# Patient Record
Sex: FEMALE | Race: WHITE | NOT HISPANIC OR LATINO | Employment: OTHER | URBAN - METROPOLITAN AREA
[De-identification: names, ages, dates, MRNs, and addresses within clinical notes are randomized per-mention and may not be internally consistent; named-entity substitution may affect disease eponyms.]

---

## 2018-07-20 ENCOUNTER — IN-CLINIC DEVICE VISIT (OUTPATIENT)
Dept: CARDIOLOGY CLINIC | Facility: CLINIC | Age: 83
End: 2018-07-20
Payer: MEDICARE

## 2018-07-20 DIAGNOSIS — I49.5 SICK SINUS SYNDROME (HCC): Primary | ICD-10-CM

## 2018-07-20 DIAGNOSIS — Z95.0 PRESENCE OF PERMANENT CARDIAC PACEMAKER: ICD-10-CM

## 2018-07-20 PROCEDURE — 93280 PM DEVICE PROGR EVAL DUAL: CPT | Performed by: INTERNAL MEDICINE

## 2018-08-15 PROBLEM — I63.9 CVA (CEREBRAL VASCULAR ACCIDENT) (HCC): Status: ACTIVE | Noted: 2018-08-15

## 2018-08-15 PROBLEM — E03.9 HYPOTHYROIDISM: Status: ACTIVE | Noted: 2018-08-15

## 2018-08-15 PROBLEM — R01.1 CARDIAC MURMUR: Status: ACTIVE | Noted: 2018-08-15

## 2018-08-15 PROBLEM — I44.2 COMPLETE ATRIOVENTRICULAR BLOCK (HCC): Status: ACTIVE | Noted: 2018-08-15

## 2018-08-15 PROBLEM — E78.2 MIXED HYPERLIPIDEMIA: Status: ACTIVE | Noted: 2018-08-15

## 2018-08-15 PROBLEM — E78.5 DYSLIPIDEMIA: Status: ACTIVE | Noted: 2018-08-15

## 2018-08-15 PROBLEM — I10 HYPERTENSION: Status: ACTIVE | Noted: 2018-08-15

## 2018-08-15 PROBLEM — Z95.0 CARDIAC PACEMAKER IN SITU: Status: ACTIVE | Noted: 2018-08-15

## 2018-08-15 RX ORDER — ASPIRIN 325 MG
325 TABLET ORAL DAILY
COMMUNITY

## 2018-08-15 RX ORDER — LEVOTHYROXINE SODIUM 0.03 MG/1
25 TABLET ORAL DAILY
COMMUNITY

## 2018-08-15 RX ORDER — PANTOPRAZOLE SODIUM 40 MG/1
40 TABLET, DELAYED RELEASE ORAL DAILY
COMMUNITY

## 2018-08-22 ENCOUNTER — APPOINTMENT (EMERGENCY)
Dept: CT IMAGING | Facility: HOSPITAL | Age: 83
DRG: 301 | End: 2018-08-22
Payer: MEDICARE

## 2018-08-22 ENCOUNTER — HOSPITAL ENCOUNTER (INPATIENT)
Facility: HOSPITAL | Age: 83
LOS: 2 days | Discharge: HOME | DRG: 301 | End: 2018-08-25
Attending: EMERGENCY MEDICINE | Admitting: GENERAL PRACTICE
Payer: MEDICARE

## 2018-08-22 DIAGNOSIS — R94.31 ABNORMAL EKG: ICD-10-CM

## 2018-08-22 DIAGNOSIS — I71.2 ASCENDING AORTIC ANEURYSM (HCC): Primary | ICD-10-CM

## 2018-08-22 DIAGNOSIS — R55 NEAR SYNCOPE: ICD-10-CM

## 2018-08-22 PROBLEM — F03.90 DEMENTIA (HCC): Status: ACTIVE | Noted: 2018-08-22

## 2018-08-22 LAB
ALBUMIN SERPL BCP-MCNC: 3.6 G/DL (ref 3.5–5)
ALP SERPL-CCNC: 103 U/L (ref 46–116)
ALT SERPL W P-5'-P-CCNC: 14 U/L (ref 12–78)
ANION GAP SERPL CALCULATED.3IONS-SCNC: 5 MMOL/L (ref 4–13)
APTT PPP: 27 SECONDS (ref 24–36)
AST SERPL W P-5'-P-CCNC: 14 U/L (ref 5–45)
ATRIAL RATE: 83 BPM
BACTERIA UR QL AUTO: ABNORMAL /HPF
BASOPHILS # BLD AUTO: 0.03 THOUSANDS/ΜL (ref 0–0.1)
BASOPHILS NFR BLD AUTO: 0 % (ref 0–1)
BILIRUB SERPL-MCNC: 0.7 MG/DL (ref 0.2–1)
BILIRUB UR QL STRIP: NEGATIVE
BUN SERPL-MCNC: 21 MG/DL (ref 5–25)
CALCIUM SERPL-MCNC: 9.7 MG/DL (ref 8.3–10.1)
CHLORIDE SERPL-SCNC: 99 MMOL/L (ref 100–108)
CLARITY UR: CLEAR
CO2 SERPL-SCNC: 33 MMOL/L (ref 21–32)
COLOR UR: YELLOW
CREAT SERPL-MCNC: 1.15 MG/DL (ref 0.6–1.3)
EOSINOPHIL # BLD AUTO: 0.03 THOUSAND/ΜL (ref 0–0.61)
EOSINOPHIL NFR BLD AUTO: 0 % (ref 0–6)
ERYTHROCYTE [DISTWIDTH] IN BLOOD BY AUTOMATED COUNT: 15.6 % (ref 11.6–15.1)
GFR SERPL CREATININE-BSD FRML MDRD: 40 ML/MIN/1.73SQ M
GLUCOSE SERPL-MCNC: 106 MG/DL (ref 65–140)
GLUCOSE UR STRIP-MCNC: NEGATIVE MG/DL
HCT VFR BLD AUTO: 45.3 % (ref 34.8–46.1)
HGB BLD-MCNC: 14.5 G/DL (ref 11.5–15.4)
HGB UR QL STRIP.AUTO: NEGATIVE
IMM GRANULOCYTES # BLD AUTO: 0.05 THOUSAND/UL (ref 0–0.2)
IMM GRANULOCYTES NFR BLD AUTO: 1 % (ref 0–2)
INR PPP: 0.95 (ref 0.86–1.17)
KETONES UR STRIP-MCNC: NEGATIVE MG/DL
LACTATE SERPL-SCNC: 0.9 MMOL/L (ref 0.5–2)
LEUKOCYTE ESTERASE UR QL STRIP: ABNORMAL
LIPASE SERPL-CCNC: 465 U/L (ref 73–393)
LYMPHOCYTES # BLD AUTO: 0.77 THOUSANDS/ΜL (ref 0.6–4.47)
LYMPHOCYTES NFR BLD AUTO: 9 % (ref 14–44)
MAGNESIUM SERPL-MCNC: 1.8 MG/DL (ref 1.6–2.6)
MCH RBC QN AUTO: 27.2 PG (ref 26.8–34.3)
MCHC RBC AUTO-ENTMCNC: 32 G/DL (ref 31.4–37.4)
MCV RBC AUTO: 85 FL (ref 82–98)
MONOCYTES # BLD AUTO: 0.85 THOUSAND/ΜL (ref 0.17–1.22)
MONOCYTES NFR BLD AUTO: 10 % (ref 4–12)
NEUTROPHILS # BLD AUTO: 6.51 THOUSANDS/ΜL (ref 1.85–7.62)
NEUTS SEG NFR BLD AUTO: 80 % (ref 43–75)
NITRITE UR QL STRIP: NEGATIVE
NON-SQ EPI CELLS URNS QL MICRO: ABNORMAL /HPF
NRBC BLD AUTO-RTO: 0 /100 WBCS
PH UR STRIP.AUTO: 7 [PH] (ref 4.5–8)
PLATELET # BLD AUTO: 174 THOUSANDS/UL (ref 149–390)
PMV BLD AUTO: 10.2 FL (ref 8.9–12.7)
POTASSIUM SERPL-SCNC: 4.3 MMOL/L (ref 3.5–5.3)
PROT SERPL-MCNC: 7.7 G/DL (ref 6.4–8.2)
PROT UR STRIP-MCNC: NEGATIVE MG/DL
PROTHROMBIN TIME: 12.6 SECONDS (ref 11.8–14.2)
QRS AXIS: -30 DEGREES
QRSD INTERVAL: 118 MS
QT INTERVAL: 412 MS
QTC INTERVAL: 484 MS
RBC # BLD AUTO: 5.33 MILLION/UL (ref 3.81–5.12)
RBC #/AREA URNS AUTO: ABNORMAL /HPF
SODIUM SERPL-SCNC: 137 MMOL/L (ref 136–145)
SP GR UR STRIP.AUTO: <=1.005 (ref 1–1.03)
T WAVE AXIS: -56 DEGREES
TROPONIN I SERPL-MCNC: 0.04 NG/ML
UROBILINOGEN UR QL STRIP.AUTO: 1 E.U./DL
VENTRICULAR RATE: 83 BPM
WBC # BLD AUTO: 8.24 THOUSAND/UL (ref 4.31–10.16)
WBC #/AREA URNS AUTO: ABNORMAL /HPF

## 2018-08-22 PROCEDURE — 83735 ASSAY OF MAGNESIUM: CPT | Performed by: EMERGENCY MEDICINE

## 2018-08-22 PROCEDURE — 83690 ASSAY OF LIPASE: CPT | Performed by: EMERGENCY MEDICINE

## 2018-08-22 PROCEDURE — 81001 URINALYSIS AUTO W/SCOPE: CPT | Performed by: EMERGENCY MEDICINE

## 2018-08-22 PROCEDURE — 96374 THER/PROPH/DIAG INJ IV PUSH: CPT

## 2018-08-22 PROCEDURE — 36415 COLL VENOUS BLD VENIPUNCTURE: CPT | Performed by: EMERGENCY MEDICINE

## 2018-08-22 PROCEDURE — 96361 HYDRATE IV INFUSION ADD-ON: CPT

## 2018-08-22 PROCEDURE — 93010 ELECTROCARDIOGRAM REPORT: CPT | Performed by: INTERNAL MEDICINE

## 2018-08-22 PROCEDURE — 84484 ASSAY OF TROPONIN QUANT: CPT | Performed by: EMERGENCY MEDICINE

## 2018-08-22 PROCEDURE — 83605 ASSAY OF LACTIC ACID: CPT | Performed by: EMERGENCY MEDICINE

## 2018-08-22 PROCEDURE — 85610 PROTHROMBIN TIME: CPT | Performed by: EMERGENCY MEDICINE

## 2018-08-22 PROCEDURE — 99285 EMERGENCY DEPT VISIT HI MDM: CPT

## 2018-08-22 PROCEDURE — 99220 PR INITIAL OBSERVATION CARE/DAY 70 MINUTES: CPT | Performed by: FAMILY MEDICINE

## 2018-08-22 PROCEDURE — 80053 COMPREHEN METABOLIC PANEL: CPT | Performed by: EMERGENCY MEDICINE

## 2018-08-22 PROCEDURE — 71275 CT ANGIOGRAPHY CHEST: CPT

## 2018-08-22 PROCEDURE — 85730 THROMBOPLASTIN TIME PARTIAL: CPT | Performed by: EMERGENCY MEDICINE

## 2018-08-22 PROCEDURE — 85025 COMPLETE CBC W/AUTO DIFF WBC: CPT | Performed by: EMERGENCY MEDICINE

## 2018-08-22 PROCEDURE — 70450 CT HEAD/BRAIN W/O DYE: CPT

## 2018-08-22 PROCEDURE — 74175 CTA ABDOMEN W/CONTRAST: CPT

## 2018-08-22 PROCEDURE — 93005 ELECTROCARDIOGRAM TRACING: CPT

## 2018-08-22 RX ORDER — POLYVINYL ALCOHOL 14 MG/ML
2 SOLUTION/ DROPS OPHTHALMIC DAILY
COMMUNITY

## 2018-08-22 RX ORDER — LABETALOL HYDROCHLORIDE 5 MG/ML
10 INJECTION, SOLUTION INTRAVENOUS ONCE
Status: COMPLETED | OUTPATIENT
Start: 2018-08-22 | End: 2018-08-22

## 2018-08-22 RX ORDER — PANTOPRAZOLE SODIUM 40 MG/1
40 TABLET, DELAYED RELEASE ORAL DAILY
Status: DISCONTINUED | OUTPATIENT
Start: 2018-08-22 | End: 2018-08-25 | Stop reason: HOSPADM

## 2018-08-22 RX ORDER — ONDANSETRON 2 MG/ML
4 INJECTION INTRAMUSCULAR; INTRAVENOUS EVERY 6 HOURS PRN
Status: DISCONTINUED | OUTPATIENT
Start: 2018-08-22 | End: 2018-08-25 | Stop reason: HOSPADM

## 2018-08-22 RX ORDER — ACETAMINOPHEN 325 MG/1
650 TABLET ORAL ONCE
Status: COMPLETED | OUTPATIENT
Start: 2018-08-22 | End: 2018-08-22

## 2018-08-22 RX ORDER — LIDOCAINE 50 MG/G
1 PATCH TOPICAL ONCE
Status: COMPLETED | OUTPATIENT
Start: 2018-08-22 | End: 2018-08-22

## 2018-08-22 RX ORDER — CYCLOSPORINE 0.5 MG/ML
1 EMULSION OPHTHALMIC 2 TIMES DAILY
COMMUNITY

## 2018-08-22 RX ORDER — IPRATROPIUM BROMIDE AND ALBUTEROL SULFATE 2.5; .5 MG/3ML; MG/3ML
3 SOLUTION RESPIRATORY (INHALATION) ONCE
Status: COMPLETED | OUTPATIENT
Start: 2018-08-22 | End: 2018-08-22

## 2018-08-22 RX ORDER — LEVOTHYROXINE SODIUM 0.03 MG/1
25 TABLET ORAL DAILY
Status: DISCONTINUED | OUTPATIENT
Start: 2018-08-22 | End: 2018-08-25 | Stop reason: HOSPADM

## 2018-08-22 RX ORDER — ASPIRIN 325 MG
325 TABLET ORAL DAILY
Status: DISCONTINUED | OUTPATIENT
Start: 2018-08-22 | End: 2018-08-25 | Stop reason: HOSPADM

## 2018-08-22 RX ORDER — POLYVINYL ALCOHOL 14 MG/ML
2 SOLUTION/ DROPS OPHTHALMIC DAILY
Status: DISCONTINUED | OUTPATIENT
Start: 2018-08-22 | End: 2018-08-22

## 2018-08-22 RX ADMIN — ACETAMINOPHEN 650 MG: 325 TABLET, FILM COATED ORAL at 10:38

## 2018-08-22 RX ADMIN — LEVOTHYROXINE SODIUM 25 MCG: 25 TABLET ORAL at 15:52

## 2018-08-22 RX ADMIN — PANTOPRAZOLE SODIUM 40 MG: 40 TABLET, DELAYED RELEASE ORAL at 15:52

## 2018-08-22 RX ADMIN — IPRATROPIUM BROMIDE AND ALBUTEROL SULFATE 3 ML: .5; 3 SOLUTION RESPIRATORY (INHALATION) at 10:38

## 2018-08-22 RX ADMIN — IODIXANOL 85 ML: 320 INJECTION, SOLUTION INTRAVASCULAR at 11:29

## 2018-08-22 RX ADMIN — DEXTRAN 70 AND HYPROMELLOSE 2910 2 DROP: 1; 3 SOLUTION/ DROPS OPHTHALMIC at 16:03

## 2018-08-22 RX ADMIN — SODIUM CHLORIDE 1000 ML: 0.9 INJECTION, SOLUTION INTRAVENOUS at 10:31

## 2018-08-22 RX ADMIN — LIDOCAINE 1 PATCH: 50 PATCH TOPICAL at 10:38

## 2018-08-22 RX ADMIN — ASPIRIN 325 MG: 325 TABLET ORAL at 15:52

## 2018-08-22 RX ADMIN — LABETALOL 20 MG/4 ML (5 MG/ML) INTRAVENOUS SYRINGE 10 MG: at 12:12

## 2018-08-22 NOTE — ASSESSMENT & PLAN NOTE
Near-syncope  Aspirin information gathered was most likely related to vasovagal syncope  CTA chest Ascending thoracic aortic aneurysm measuring up to 50 mm  Questionable haziness at the posterior proximal margin of the aneurysm within the mediastinum on this examination which is unfortunately significantly limited as a result of streak artifact  related to pacemaker generator and because the patient could not bring arms above head or tolerate breath-hold examination    The possibility of leaking of ascending thoracic aortic aneurysm cannot therefore unfortunately not be confidently excluded   -follow-up precaution  -continue telemetry  -Orthostatic blood pressure  -Blood pressure control  Discharge planning

## 2018-08-22 NOTE — PLAN OF CARE
Problem: PAIN - ADULT  Goal: Verbalizes/displays adequate comfort level or baseline comfort level  Interventions:  - Encourage patient to monitor pain and request assistance  - Assess pain using appropriate pain scale  - Administer analgesics based on type and severity of pain and evaluate response  - Implement non-pharmacological measures as appropriate and evaluate response  - Consider cultural and social influences on pain and pain management  - Notify physician/advanced practitioner if interventions unsuccessful or patient reports new pain   Outcome: Progressing      Problem: INFECTION - ADULT  Goal: Absence or prevention of progression during hospitalization  INTERVENTIONS:  - Assess and monitor for signs and symptoms of infection  - Monitor lab/diagnostic results  - Monitor all insertion sites, i e  indwelling lines, tubes, and drains  - Monitor endotracheal (as able) and nasal secretions for changes in amount and color  - [place] appropriate cooling/warming therapies per order  - Administer medications as ordered  - Instruct and encourage patient and family to use good hand hygiene technique  - Identify and instruct in appropriate isolation precautions for identified infection/condition   Outcome: Progressing    Goal: Absence of fever/infection during neutropenic period  INTERVENTIONS:  - Monitor WBC  - Implement neutropenic guidelines   Outcome: Progressing      Problem: SAFETY ADULT  Goal: Maintain or return to baseline ADL function  INTERVENTIONS:  -  Assess patient's ability to carry out ADLs; assess patient's baseline for ADL function and identify physical deficits which impact ability to perform ADLs (bathing, care of mouth/teeth, toileting, grooming, dressing, etc )  - Assess/evaluate cause of self-care deficits   - Assess range of motion  - Assess patient's mobility; develop plan if impaired  - Assess patient's need for assistive devices and provide as appropriate  - Encourage maximum independence but intervene and supervise when necessary  ¯ Involve family in performance of ADLs  ¯ Assess for home care needs following discharge   ¯ Request OT consult to assist with ADL evaluation and planning for discharge  ¯ Provide patient education as appropriate   Outcome: Progressing    Goal: Maintain or return mobility status to optimal level  INTERVENTIONS:  - Assess patient's baseline mobility status (ambulation, transfers, stairs, etc )    - Identify cognitive and physical deficits and behaviors that affect mobility  - Identify mobility aids required to assist with transfers and/or ambulation (gait belt, sit-to-stand, lift, walker, cane, etc )  - [place] fall precautions as indicated by assessment  - Record patient progress and toleration of activity level on Mobility SBAR; progress patient to next Phase/Stage  - Instruct patient to call for assistance with activity based on assessment  - Request Rehabilitation consult to assist with strengthening/weightbearing, etc    Outcome: Progressing      Problem: DISCHARGE PLANNING  Goal: Discharge to home or other facility with appropriate resources  INTERVENTIONS:  - Identify barriers to discharge w/patient and caregiver  - Arrange for needed discharge resources and transportation as appropriate  - Identify discharge learning needs (meds, wound care, etc )  - Arrange for interpretive services to assist at discharge as needed  - Refer to Case Management Department for coordinating discharge planning if the patient needs post-hospital services based on physician/advanced practitioner order or complex needs related to functional status, cognitive ability, or social support system   Outcome: Progressing      Problem: Knowledge Deficit  Goal: Patient/family/caregiver demonstrates understanding of disease process, treatment plan, medications, and discharge instructions  Complete learning assessment and assess knowledge base    Interventions:  - Provide teaching at level of understanding  - Provide teaching via preferred learning methods   Outcome: Progressing

## 2018-08-22 NOTE — ED NOTES
Thea from [address] [place] [address] updated of patient's admission status     Kevon Kendall RN  08/22/18 0921

## 2018-08-22 NOTE — H&P
H&P- Mecca Redmond 5/2/1921, 80 y o  female MRN: [id number]    Unit/Bed#: ED 16 Encounter: [id number]    Primary Care Provider: Sandip Lord MD   Date and time admitted to hospital: 8/22/2018  9:52 AM        * Syncope   Assessment & Plan    Near-syncope  Aspirin information gathered was most likely related to vasovagal syncope  CTA chest Ascending thoracic aortic aneurysm measuring up to 50 mm  Questionable haziness at the posterior proximal margin of the aneurysm within the mediastinum on this examination which is unfortunately significantly limited as a result of streak artifact  related to pacemaker generator and because the patient could not bring arms above head or tolerate breath-hold examination  The possibility of leaking of ascending thoracic aortic aneurysm cannot therefore unfortunately not be confidently excluded   -follow-up precaution  -continue telemetry  -Orthostatic blood pressure  -Blood pressure control  Discharge planning        Hypertension   Assessment & Plan    Blood pressure is stable for age  Continue home medication        CVA (cerebral vascular accident) [facility])   Assessment & Plan    Previously  Continue  secondary prevention        Cardiac pacemaker in situ   Assessment & Plan    No acute events        Hypothyroidism   Assessment & Plan    Continue levothyroxine              Anticipated Length of Stay:  Patient will be admitted on an Observation basis with an anticipated length of stay of  < 2 midnights  Justification for Hospital Stay: near syncope    Total Time for Visit, including Counseling / Coordination of Care: 1 hour  Greater than 50% of this total time spent on direct patient counseling and coordination of care  Chief Complaint:       History of Present Illness:    Mecca Redmond is a 80 y o  female significant medical hx pf CAD, CVA, Hypothyroidism, Hyperlipidemia, HTN who was brought to ED after patient has an episode of near syncope at [facility]  Information gather from EMR  Patient doesn't know why she is here  She states maybe she was having some breathing problem  Review of Systems:    Review of Systems   Constitutional: Negative for activity change and appetite change  Eyes: Negative for pain, discharge, redness and itching  Respiratory: Negative for apnea, cough, choking and chest tightness  Cardiovascular: Positive for chest pain  Musculoskeletal: Positive for back pain  Neurological: Positive for syncope  Negative for dizziness and facial asymmetry  Past Medical and Surgical History:     Past Medical History:   Diagnosis Date    Atrioventricular block, complete ([address] )     CAD (coronary artery disease)     Cardiac pacemaker in situ     Cerebral infarct (HCC)     due to unspecified occlusion and stenosis of unspecified cerebral artery    Essential (primary) hypertension     Mixed hyperlipidemia     Subarachnoid hemorrhage (HCC)        Past Surgical History:   Procedure Laterality Date    CARDIAC PACEMAKER PLACEMENT         Meds/Allergies:    Prior to Admission medications    Medication Sig Start Date End Date Taking? Authorizing Provider   aspirin 325 mg tablet Take 325 mg by mouth daily    Historical Provider, MD   Cholecalciferol 2000 units TABS Take 1 tablet by mouth daily    Historical Provider, MD   levothyroxine 25 mcg tablet Take 25 mcg by mouth daily    Historical Provider, MD   pantoprazole (PROTONIX) 40 mg tablet Take 40 mg by mouth daily    Historical Provider, MD     I have reviewed home medications with patient personally      Allergies: No Known Allergies    Social History:     Marital Status: /Civil Union   Occupation:  Patient Pre-hospital Living Situation:   Patient Pre-hospital Level of Mobility:   Patient Pre-hospital Diet Restrictions:   Substance Use History:   History   Alcohol use Not on file     History   Smoking Status    Former Smoker   Smokeless Tobacco    Not on file     History   Drug use: Unknown       Family History:    non-contributory    Physical Exam:     Vitals:   Blood Pressure: 128/58 (08/22/18 1300)  Pulse: 60 (08/22/18 1300)  Temperature: (!) 97 4 °F (36 3 °C) (08/22/18 0955)  Temp Source: Oral (08/22/18 0955)  Respirations: 18 (08/22/18 1300)  Weight - Scale: 46 1 kg (101 lb 10 1 oz) (08/22/18 0955)  SpO2: 99 % (08/22/18 1300)    Physical Exam   Constitutional: She is oriented to person, place, and time  Neck: No JVD present  No tracheal deviation present  No thyromegaly present  Cardiovascular: Normal rate, regular rhythm and normal heart sounds  Exam reveals no gallop and no friction rub  No murmur heard  Pulmonary/Chest: Effort normal and breath sounds normal  No respiratory distress  She has no wheezes  She has no rales  She exhibits tenderness  Abdominal: She exhibits no distension and no mass  There is no tenderness  There is no rebound and no guarding  Musculoskeletal: She exhibits no edema, tenderness or deformity  Lymphadenopathy:     She has no cervical adenopathy  Neurological: She is alert and oriented to person, place, and time  She has normal reflexes  No cranial nerve deficit  Coordination normal    Skin: Skin is warm  She is not diaphoretic  Psychiatric: She has a normal mood and affect  Additional Data:     Lab Results: I have personally reviewed pertinent reports          Results from last 7 days  Lab Units 08/22/18  1028   WBC Thousand/uL 8 24   HEMOGLOBIN g/dL 14 5   HEMATOCRIT % 45 3   PLATELETS Thousands/uL 174   NEUTROS PCT % 80*   LYMPHS PCT % 9*   MONOS PCT % 10   EOS PCT % 0       Results from last 7 days  Lab Units 08/22/18  1028   SODIUM mmol/L 137   POTASSIUM mmol/L 4 3   CHLORIDE mmol/L 99*   CO2 mmol/L 33*   BUN mg/dL 21   CREATININE mg/dL 1 15   CALCIUM mg/dL 9 7   TOTAL PROTEIN g/dL 7 7   BILIRUBIN TOTAL mg/dL 0 70   ALK PHOS U/L 103   ALT U/L 14   AST U/L 14   GLUCOSE RANDOM mg/dL 106       Results from last 7 days  Lab Units 08/22/18  1028   INR  0 95       Imaging: I have personally reviewed pertinent reports  Ct Head Without Contrast    Result Date: 8/22/2018  Narrative: CT BRAIN - WITHOUT CONTRAST INDICATION:   Near syncope  History of prior stroke  COMPARISON:  None  TECHNIQUE:  CT examination of the brain was performed  In addition to axial images, coronal 2D reformatted images were created and submitted for interpretation  Radiation dose length product (DLP) for this visit:  949 mGy-cm   This examination, like all CT scans performed in the [facility], was performed utilizing techniques to minimize radiation dose exposure, including the use of iterative reconstruction and automated exposure control  IMAGE QUALITY:  Diagnostic  FINDINGS: PARENCHYMA:  There is encephalomalacia in the left frontal lobe and anterior insula related to prior left anterior MCA distribution infarction  There is a small chronic cortical infarction in high anterior right frontal lobe  Chronic infarctions are noted bilateral posterior inferior cerebellar artery distributions, larger on the right than on the left  No CT evidence of acute intracranial ischemia  No intracranial mass, mass effect or midline shift  No acute intracranial hemorrhage  Atherosclerotic vessel calcifications are noted  VENTRICLES AND EXTRA-AXIAL SPACES:  Ex vacuo enlargement of left lateral ventricle  Age-related volume loss  No hydrocephalus  VISUALIZED ORBITS AND PARANASAL SINUSES:  Unremarkable  CALVARIUM AND EXTRACRANIAL SOFT TISSUES:  Normal      Impression: No acute intracranial abnormality  Multiple chronic infarctions as described  Workstation performed: [id number]     Cta Dissection Protocol Chest And Abdomen    Result Date: 8/22/2018  Narrative: CTA - CHEST AND ABDOMEN  - WITHOUT AND WITH IV CONTRAST INDICATION:   Near syncope  Left-sided chest pain  Left flank pain    COMPARISON:  None   TECHNIQUE: CT examination of the chest and abdomen was performed both prior to and after the administration of intravenous contrast   Thin section angiographic arterial phase post contrast technique was used in order to evaluate for aortic dissection  3D reformatted images and volume rendering were performed on an independent workstation  Additionally, axial, sagittal, and coronal 2D reformatted images were created from the source data and submitted for interpretation  Radiation dose length product (DLP) for this visit:  487 mGy-cm   This examination, like all CT scans performed in the [facility], was performed utilizing techniques to minimize radiation dose exposure, including the use of iterative reconstruction and automated exposure control  IV Contrast:  85 mL of iodixanol (VISIPAQUE) Enteric Contrast: Enteric contrast was not administered  FINDINGS: AORTA: There is fusiform prominence of the supravalvular ascending thoracic aorta measuring up to 35 mm  Also noted is a more focal ascending thoracic aortic aneurysm measuring up to 50 mm (series 604 image 81)  There is mural atheroma within the walls  of this more focal and larger aneurysm which is at the level of the left common carotid artery origin  The aorta tapers at the level of the proximal aortic arch to 31 mm and remains at approximately 27 mm to the level of the aortic hiatus where the aorta again becomes enlarged measuring up to 32 mm in greatest caliber  Also noted is an infrarenal abdominal aortic aneurysm measuring up to 41 mm in caliber with extensive mural atheroma  Common iliac arteries are patent  At the proximal margin of the ascending thoracic aortic aneurysm there is questionable haziness of the mediastinal fat along the posterior wall of the ascending thoracic aorta which is unfortunately very poorly evaluated in this patient who could not lift her arms above her head, has streak artifact from a left chest pacemaker generator, and demonstrates respiratory motion artifact  The possibility of leaking of ascending thoracic aortic aneurysm cannot therefore unfortunately not be confidently excluded on the basis of the current examination  There is no aortic dissection  Extensive atherosclerotic calcification is noted without evidence of flow-limiting vascular stenosis of the aorta or great vessels in the chest   There is mild less than 50% atherosclerotic stenosis at origin of renal arteries bilaterally  Otherwise, no flow-limiting atherosclerotic vascular stenosis in the visualized abdomen  No pulmonary embolism  CHEST LUNGS:  Bibasilar atelectasis is noted  No dominant pulmonary parenchymal mass  No airspace opacity to suggest pneumonia  PLEURA:  Unremarkable  HEART/GREAT VESSELS:  Heart is enlarged  No pericardial effusion  Pacemaker leads noted in the right heart  MEDIASTINUM AND WILLIAM:  Diffuse air and fluid-filled prominence of the esophagus noted  CHEST WALL AND LOWER NECK:   Patient is somewhat cachectic  Left chest pacemaker is noted  No axillary lymphadenopathy  ABDOMEN LIVER/BILIARY TREE:  There is prominence of common bile duct and central intrahepatic biliary tree most consistent with the sequela of prior cholecystectomy  Liver is otherwise unremarkable  GALLBLADDER:  Gallbladder is surgically absent  SPLEEN:  Unremarkable  PANCREAS:  Unremarkable  ADRENAL GLANDS:  Incompletely evaluated 9 mm left adrenal nodule, statistically most likely to represent adenoma  KIDNEYS/URETERS:  Bilateral renal cortical thinning is noted  No solid renal mass  No hydronephrosis  VISUALIZED STOMACH, BOWEL AND APPENDIX:  No bowel obstruction  Colonic diverticulosis without convincing evidence of acute diverticulitis  VISUALIZED ABDOMINOPELVIC CAVITY:  No ascites or free intraperitoneal air  No lymphadenopathy  ABDOMINAL WALL:  Unremarkable  OSSEOUS STRUCTURES:  Osseous structures are osteopenic    There is anterior wedge compression deformity of the L1 and L3 vertebral bodies, of indeterminate age     Impression: Ascending thoracic aortic aneurysm measuring up to 50 mm  Questionable haziness at the posterior proximal margin of the aneurysm within the mediastinum on this examination which is unfortunately significantly limited as a result of streak artifact related to pacemaker generator and because the patient could not bring arms above head or tolerate breath-hold examination  The possibility of leaking of ascending thoracic aortic aneurysm cannot therefore unfortunately not be confidently excluded on the basis of the current examination  There is no significant sized mediastinal hematoma and no evidence of active extravasation of injected intravenous contrast  No aortic dissection  Extensive atherosclerotic change  Infrarenal abdominal aortic aneurysm measuring up to 41 mm  Age indeterminate anterior wedge compression deformities of the L1 and L3 vertebral bodies  I personally discussed this study with Juju Payton on 8/22/2018 at 12:02 PM  Workstation performed: [id number]       EKG, Pathology, and Other Studies Reviewed on Admission:   · EKG:     Allscripts / Epic Records Reviewed: Yes     ** Please Note: This note has been constructed using a voice recognition system   **

## 2018-08-22 NOTE — ED PROVIDER NOTES
History  Chief Complaint   Patient presents with    Syncope     Pt was brought in by EMS from [address] [place] [address] due to a near syncopal event this morning at breakfast  Pt currently complains of L flank pain  Patient is a 79-year-old female with past medical history of prior cerebral infarct with baseline aphasia, coronary artery disease, complete AV block status post pacemaker, hypertension, hyperlipidemia, hypothyroidism, presents to the emergency department by ambulance from [facility], for a near syncopal event  According to EMS and nursing home, patient is at her baseline mental status and does have mild dementia at baseline  Stay did note that both of her hands appeared purplish in color but no reported history of Raynaud's phenomenon  Upon arrival to the ED, patient was complaining of left chest and flank pain  Patient overall a poor historian due to age, dementia and aphasia  She does remember feeling as though she was going to pass out and continues to feel dizzy and lightheaded currently  She complains of pain in her left lateral chest/flank region  She states this pain started yesterday  Patient also reports feeling short of breath  She denies any headache, vertigo feeling, change in vision or hearing (has baseline hearing loss), recent URI symptoms, cough, hemoptysis, abdominal pain, nausea, vomiting, diarrhea, constipation, blood per rectum or melena, dysuria, change in urinary frequency, hematuria, skin rash or color change, new extremity weakness or numbness or other new focal neurologic deficits  It is unclear if patient has any recent falls          History provided by:  Patient, nursing home and EMS personnel  History limited by:  Dementia   used: No    Syncope   Associated symptoms: chest pain, dizziness and shortness of breath    Associated symptoms: no confusion, no fever, no headaches, no nausea, no palpitations, no vomiting and no weakness Prior to Admission Medications   Prescriptions Last Dose Informant Patient Reported? Taking? Cholecalciferol 2000 units TABS 8/21/2018 at 7am  Yes Yes   Sig: Take 1 tablet by mouth daily   Nutritional Supplements (ENSURE ACTIVE PO) 8/21/2018 at 3pm  Yes Yes   Sig: Take by mouth daily   aspirin 325 mg tablet 8/21/2018 at Unknown time  Yes Yes   Sig: Take 325 mg by mouth daily   cycloSPORINE (RESTASIS) 0 05 % ophthalmic emulsion 8/21/2018 at Unknown time  Yes Yes   Sig: Administer 1 drop to both eyes 2 (two) times a day   levothyroxine 25 mcg tablet 8/21/2018 at Unknown time  Yes Yes   Sig: Take 25 mcg by mouth daily   pantoprazole (PROTONIX) 40 mg tablet 8/21/2018 at 7am  Yes Yes   Sig: Take 40 mg by mouth daily   polyvinyl alcohol (LIQUIFILM TEARS) 1 4 % ophthalmic solution 8/21/2018 at Unknown time  Yes Yes   Sig: Administer 2 drops to both eyes daily      Facility-Administered Medications: None       Past Medical History:   Diagnosis Date    [facility]riovent[facility] block[facility] complete ([facility] [address] )     CAD (coronary artery disease)     Cardiac pacemaker in situ     Cerebral infarct ([facility] )     due to unspecified occlusion and stenosis of unspecified cerebral artery    Essential (primary) hypertension     Mixed hyperlipidemia     Subarachnoid hemorrhage ([facility] [address] )        Past Surgical History:   Procedure Laterality Date    CARDIAC PACEMAKER PLACEMENT         History reviewed  No pertinent family history  I have reviewed and agree with the history as documented  Social History   Substance Use Topics    Smoking status: Former Smoker    Smokeless tobacco: Not on file    Alcohol use Not on file        Review of Systems   Constitutional: Negative for chills and fever  HENT: Negative for congestion, ear pain, rhinorrhea and sore throat  Eyes: Negative for pain and visual disturbance  Respiratory: Positive for shortness of breath  Negative for cough, chest tightness and wheezing      Cardiovascular: Positive for chest pain and syncope  Negative for palpitations and leg swelling  Gastrointestinal: Negative for abdominal pain, blood in stool, constipation, diarrhea, nausea and vomiting  Genitourinary: Positive for flank pain  Negative for dysuria, frequency and hematuria  Musculoskeletal: Negative for back pain and neck pain  Skin: Negative for color change, pallor and rash  Allergic/Immunologic: Negative for immunocompromised state  Neurological: Positive for dizziness and light-headedness  Negative for syncope, facial asymmetry, speech difficulty, weakness, numbness and headaches         + Near-syncope   Hematological: Negative for adenopathy  Psychiatric/Behavioral: Negative for confusion and decreased concentration  All other systems reviewed and are negative  Physical Exam  Physical Exam   Constitutional: She appears well-developed and well-nourished  No distress  HENT:   Head: Normocephalic and atraumatic  Mouth/Throat: Oropharynx is clear and moist  No oropharyngeal exudate  Eyes: Conjunctivae and EOM are normal  Pupils are equal, round, and reactive to light  Neck: Normal range of motion  Neck supple  No JVD present  Cardiovascular: Normal rate, regular rhythm and intact distal pulses  Exam reveals no gallop and no friction rub  Murmur heard  Pulmonary/Chest: Effort normal  No respiratory distress  She has no wheezes  She has no rales  She exhibits no tenderness  Poor air movement throughout  Abdominal: Soft  Bowel sounds are normal  She exhibits no distension  There is no tenderness  There is no rebound and no guarding  Musculoskeletal: Normal range of motion  She exhibits no edema or tenderness  Lymphadenopathy:     She has no cervical adenopathy  Neurological: She is alert  No cranial nerve deficit  Oriented to person and knows she is in the hospital   Disoriented to time  No gross motor or sensory deficits  Mild baseline aphasia  Skin: Skin is warm and dry   No rash noted  She is not diaphoretic  No erythema  No pallor  Psychiatric: She has a normal mood and affect  Her behavior is normal    Nursing note and vitals reviewed        Vital Signs  ED Triage Vitals [08/22/18 0955]   Temperature Pulse Respirations Blood Pressure SpO2   (!) 97 4 °F (36 3 °C) 87 18 (!) 177/74 100 %      Temp Source Heart Rate Source Patient Position - Orthostatic VS BP Location FiO2 (%)   Oral Monitor Lying Left arm --      Pain Score       4         Vitals:    08/22/18 1300 08/22/18 1330 08/22/18 1443 08/22/18 1500   BP: 128/58 122/59 138/63    BP Location: Left arm Right arm Right arm    Pulse: 60 59 59    Resp: 18 20 18    Temp:   98 4 °F (36 9 °C)    TempSrc:   Oral    SpO2: 99% 92%     Weight:    43 8 kg (96 lb 9 oz)     Visual Acuity  Visual Acuity      Most Recent Value   L Pupil Size (mm)  1   R Pupil Size (mm)  1          ED Medications  Medications   lidocaine (LIDODERM) 5 % patch 1 patch (1 patch Transdermal Medication Applied 8/22/18 1038)   ondansetron (ZOFRAN) injection 4 mg (not administered)   aspirin tablet 325 mg (not administered)   levothyroxine tablet 25 mcg (not administered)   ENSURE ACTIVE LIQD (not administered)   pantoprazole (PROTONIX) EC tablet 40 mg (not administered)   dextran 70-hypromellose (GENTEAL TEARS) 0 1-0 3 % ophthalmic solution 2 drop (not administered)   acetaminophen (TYLENOL) tablet 650 mg (650 mg Oral Given 8/22/18 1038)   sodium chloride 0 9 % bolus 1,000 mL (1,000 mL Intravenous New Bag 8/22/18 1031)   ipratropium-albuterol (DUO-NEB) 0 5-2 5 mg/3 mL inhalation solution 3 mL (3 mL Nebulization Given 8/22/18 1038)   iodixanol (VISIPAQUE) 320 MG/ML injection 85 mL (85 mL Intravenous Given 8/22/18 1129)   labetalol (NORMODYNE) injection 10 mg (10 mg Intravenous Given 8/22/18 1212)       Diagnostic Studies  Results Reviewed     Procedure Component Value Units Date/Time    Urine Microscopic [[id number]]  (Abnormal) Collected:  08/22/18 1210    Lab Status:  Final result Specimen:  Urine from Urine, Clean Catch Updated:  08/22/18 1224     RBC, UA None Seen /hpf      WBC, UA 1-2 (A) /hpf      Epithelial Cells Occasional /hpf      Bacteria, UA None Seen /hpf     UA w Reflex to Microscopic [[id number]]  (Abnormal) Collected:  08/22/18 1210    Lab Status:  Final result Specimen:  Urine from Urine, Clean Catch Updated:  08/22/18 1216     Color, UA Yellow     Clarity, UA Clear     Specific Gravity, UA <=1 005     pH, UA 7 0     Leukocytes, UA Trace (A)     Nitrite, UA Negative     Protein, UA Negative mg/dl      Glucose, UA Negative mg/dl      Ketones, UA Negative mg/dl      Urobilinogen, UA 1 0 E U /dl      Bilirubin, UA Negative     Blood, UA Negative    Lactic acid, plasma [[id number]]  (Normal) Collected:  08/22/18 1028    Lab Status:  Final result Specimen:  Blood from Arm, Right Updated:  08/22/18 1103     LACTIC ACID 0 9 mmol/L     Narrative:         Result may be elevated if tourniquet was used during collection      Troponin I [[id number]]  (Normal) Collected:  08/22/18 1028    Lab Status:  Final result Specimen:  Blood from Arm, Right Updated:  08/22/18 1101     Troponin I 0 04 ng/mL     Comprehensive metabolic panel [[id number]]  (Abnormal) Collected:  08/22/18 1028    Lab Status:  Final result Specimen:  Blood from Arm, Right Updated:  08/22/18 1056     Sodium 137 mmol/L      Potassium 4 3 mmol/L      Chloride 99 (L) mmol/L      CO2 33 (H) mmol/L      Anion Gap 5 mmol/L      BUN 21 mg/dL      Creatinine 1 15 mg/dL      Glucose 106 mg/dL      Calcium 9 7 mg/dL      AST 14 U/L      ALT 14 U/L      Alkaline Phosphatase 103 U/L      Total Protein 7 7 g/dL      Albumin 3 6 g/dL      Total Bilirubin 0 70 mg/dL      eGFR 40 ml/min/1 73sq m     Narrative:         National Kidney Disease Education Program recommendations are as follows:  GFR calculation is accurate only with a steady state creatinine  Chronic Kidney disease less than 60 ml/min/1 73 sq  meters  Kidney failure less than 15 ml/min/1 73 sq  meters  Magnesium [[id number]]  (Normal) Collected:  08/22/18 1028    Lab Status:  Final result Specimen:  Blood from Arm, Right Updated:  08/22/18 1056     Magnesium 1 8 mg/dL     Lipase [[id number]]  (Abnormal) Collected:  08/22/18 1028    Lab Status:  Final result Specimen:  Blood from Arm, Right Updated:  08/22/18 1056     Lipase 465 (H) u/L     Protime-INR [[id number]]  (Normal) Collected:  08/22/18 1028    Lab Status:  Final result Specimen:  Blood from Arm, Right Updated:  08/22/18 1051     Protime 12 6 seconds      INR 0 95    APTT [[id number]]  (Normal) Collected:  08/22/18 1028    Lab Status:  Final result Specimen:  Blood from Arm, Right Updated:  08/22/18 1051     PTT 27 seconds     CBC and differential [[id number]]  (Abnormal) Collected:  08/22/18 1028    Lab Status:  Final result Specimen:  Blood from Arm, Right Updated:  08/22/18 1041     WBC 8 24 Thousand/uL      RBC 5 33 (H) Million/uL      Hemoglobin 14 5 g/dL      Hematocrit 45 3 %      MCV 85 fL      MCH 27 2 pg      MCHC 32 0 g/dL      RDW 15 6 (H) %      MPV 10 2 fL      Platelets 380 Thousands/uL      nRBC 0 /100 WBCs      Neutrophils Relative 80 (H) %      Immat GRANS % 1 %      Lymphocytes Relative 9 (L) %      Monocytes Relative 10 %      Eosinophils Relative 0 %      Basophils Relative 0 %      Neutrophils Absolute 6 51 Thousands/µL      Immature Grans Absolute 0 05 Thousand/uL      Lymphocytes Absolute 0 77 Thousands/µL      Monocytes Absolute 0 85 Thousand/µL      Eosinophils Absolute 0 03 Thousand/µL      Basophils Absolute 0 03 Thousands/µL                  CTA dissection protocol chest and abdomen   Final Result by Gonzalez Rizo MD (08/22 1213)      Ascending thoracic aortic aneurysm measuring up to 50 mm    Questionable haziness at the posterior proximal margin of the aneurysm within the mediastinum on this examination which is unfortunately significantly limited as a result of streak artifact    related to pacemaker generator and because the patient could not bring arms above head or tolerate breath-hold examination  The possibility of leaking of ascending thoracic aortic aneurysm cannot therefore unfortunately not be confidently excluded on    the basis of the current examination  There is no significant sized mediastinal hematoma and no evidence of active extravasation of injected intravenous contrast       No aortic dissection  Extensive atherosclerotic change  Infrarenal abdominal aortic aneurysm measuring up to 41 mm  Age indeterminate anterior wedge compression deformities of the L1 and L3 vertebral bodies  I personally discussed this study with Janae Gardiner on 8/22/2018 at 12:02 PM                   Workstation performed: [id number]         CT head without contrast   Final Result by Agapito Aldrich MD (08/22 1150)      No acute intracranial abnormality  Multiple chronic infarctions as described  Workstation performed: [id number]                    Procedures  ECG 12 Lead Documentation  Date/Time: 8/22/2018 11:47 AM  Performed by: Mayra Grade by: Nicho Nolen     ECG reviewed by me, the ED Provider: yes    Patient location:  ED  Previous ECG:     Previous ECG:  Compared to current    Comparison ECG info:  8-9-2001; T-wave inversion in the lateral and anterior leads is NEW    Similarity:  Changes noted  Rate:     ECG rate:  83    ECG rate assessment: normal    Rhythm:     Rhythm: sinus rhythm    Ectopy:     Ectopy: PAC    QRS:     QRS axis:  Left    QRS intervals:   Wide  Conduction:     Conduction: abnormal      Abnormal conduction: complete RBBB    ST segments:     ST segments:  Normal  T waves:     T waves: inverted      Inverted:  II, III, aVF, V3, V4, V5 and V6           Phone Contacts  ED Phone Contact    ED Course  ED Course as of Aug 22 1533   Wed Aug 22, 2018   1041 Hemoglobin: 14 5   1101 Troponin I: 0 04   65 Spoke with radiologist who noted that there is a 5 cm ascending aortic aneurysm just proximal to the level of the proximal aortic arch  Due to body habitus and pacemaker, he cannot completely exclude a small leak but there is no evidence of major rupture  Will consult CT surgery for recommendations  [place] on call for [facility] paged  6559 Attempted update patient about CT scan findings but due to her dementia, she was not understanding these findings and at this time I do not feel she can make appropriate medical decisions  Spoke with patient's daughter about CT scan findings and daughter does not believe that patient would want a major surgery even if indicated  She does confirm patient is DNI and DNR  [address] [facility] [address] Ulices Ford is in [address]  OR nurse called back and will page CT surgery PA     1229 Dr Ulices Ford called back and reports patient is not a surgical candidate and recommends comfort care  Will admit to [facility]  MDM  Number of Diagnoses or Management Options  Diagnosis management comments: 35-year-old female presents with a near syncopal episode as well as recent complaints of left chest and flank pain  Differential is vast and includes dehydration, orthostatic hypotension, acute coronary syndrome/MI, pulmonary embolism, aortic dissection, new TIA or stroke  Will workup with cardiac labs, CT scan of the head and a CTA of the chest and abdomen  Will give IV fluids, Tylenol and Lidoderm patch for pain  Most likely will admit for further observation and workup         Amount and/or Complexity of Data Reviewed  Clinical lab tests: reviewed and ordered  Tests in the radiology section of CPT®: ordered and reviewed  Tests in the medicine section of CPT®: ordered and reviewed  Obtain history from someone other than the patient: yes  Independent visualization of images, tracings, or specimens: yes      CritCare Time    Disposition  Final diagnoses:   Ascending aortic aneurysm ([address] )   Near syncope Abnormal EKG     Time reflects when diagnosis was documented in both MDM as applicable and the Disposition within this note     Time User Action Codes Description Comment    8/22/2018 12:40 PM Rahul  E Add [I71 2] Ascending aortic aneurysm ([address] )     8/22/2018 12:40 PM Rahul  E Add [R55] Near syncope     8/22/2018 12:40 PM Rahul  E Add [R94 31] Abnormal EKG       ED Disposition     ED Disposition Condition Comment    Admit  Case was discussed with SHIRLEY and the patient's admission status was agreed to be Admission Status: observation status to the service of Dr Letha Marrero   Follow-up Information    None         Current Discharge Medication List      CONTINUE these medications which have NOT CHANGED    Details   aspirin 325 mg tablet Take 325 mg by mouth daily      Cholecalciferol 2000 units TABS Take 1 tablet by mouth daily      cycloSPORINE (RESTASIS) 0 05 % ophthalmic emulsion Administer 1 drop to both eyes 2 (two) times a day      levothyroxine 25 mcg tablet Take 25 mcg by mouth daily      Nutritional Supplements (ENSURE ACTIVE PO) Take by mouth daily      pantoprazole (PROTONIX) 40 mg tablet Take 40 mg by mouth daily      polyvinyl alcohol (LIQUIFILM TEARS) 1 4 % ophthalmic solution Administer 2 drops to both eyes daily           No discharge procedures on file      ED Provider  Electronically Signed by           Wilder Bronson DO  08/22/18 8790

## 2018-08-23 LAB
ANION GAP SERPL CALCULATED.3IONS-SCNC: 6 MMOL/L (ref 4–13)
BASOPHILS # BLD AUTO: 0.03 THOUSANDS/ΜL (ref 0–0.1)
BASOPHILS NFR BLD AUTO: 0 % (ref 0–1)
BUN SERPL-MCNC: 18 MG/DL (ref 5–25)
CALCIUM SERPL-MCNC: 9 MG/DL (ref 8.3–10.1)
CHLORIDE SERPL-SCNC: 103 MMOL/L (ref 100–108)
CO2 SERPL-SCNC: 29 MMOL/L (ref 21–32)
CREAT SERPL-MCNC: 0.95 MG/DL (ref 0.6–1.3)
EOSINOPHIL # BLD AUTO: 0.12 THOUSAND/ΜL (ref 0–0.61)
EOSINOPHIL NFR BLD AUTO: 1 % (ref 0–6)
ERYTHROCYTE [DISTWIDTH] IN BLOOD BY AUTOMATED COUNT: 15.8 % (ref 11.6–15.1)
GFR SERPL CREATININE-BSD FRML MDRD: 50 ML/MIN/1.73SQ M
GLUCOSE SERPL-MCNC: 85 MG/DL (ref 65–140)
HCT VFR BLD AUTO: 39 % (ref 34.8–46.1)
HGB BLD-MCNC: 12.4 G/DL (ref 11.5–15.4)
IMM GRANULOCYTES # BLD AUTO: 0.03 THOUSAND/UL (ref 0–0.2)
IMM GRANULOCYTES NFR BLD AUTO: 0 % (ref 0–2)
LYMPHOCYTES # BLD AUTO: 1.81 THOUSANDS/ΜL (ref 0.6–4.47)
LYMPHOCYTES NFR BLD AUTO: 21 % (ref 14–44)
MCH RBC QN AUTO: 26.7 PG (ref 26.8–34.3)
MCHC RBC AUTO-ENTMCNC: 31.8 G/DL (ref 31.4–37.4)
MCV RBC AUTO: 84 FL (ref 82–98)
MONOCYTES # BLD AUTO: 1.34 THOUSAND/ΜL (ref 0.17–1.22)
MONOCYTES NFR BLD AUTO: 15 % (ref 4–12)
NEUTROPHILS # BLD AUTO: 5.43 THOUSANDS/ΜL (ref 1.85–7.62)
NEUTS SEG NFR BLD AUTO: 63 % (ref 43–75)
NRBC BLD AUTO-RTO: 0 /100 WBCS
PLATELET # BLD AUTO: 169 THOUSANDS/UL (ref 149–390)
PMV BLD AUTO: 10.6 FL (ref 8.9–12.7)
POTASSIUM SERPL-SCNC: 3.9 MMOL/L (ref 3.5–5.3)
RBC # BLD AUTO: 4.64 MILLION/UL (ref 3.81–5.12)
SODIUM SERPL-SCNC: 138 MMOL/L (ref 136–145)
WBC # BLD AUTO: 8.76 THOUSAND/UL (ref 4.31–10.16)

## 2018-08-23 PROCEDURE — 80048 BASIC METABOLIC PNL TOTAL CA: CPT | Performed by: FAMILY MEDICINE

## 2018-08-23 PROCEDURE — 99223 1ST HOSP IP/OBS HIGH 75: CPT | Performed by: GENERAL PRACTICE

## 2018-08-23 PROCEDURE — 97163 PT EVAL HIGH COMPLEX 45 MIN: CPT

## 2018-08-23 PROCEDURE — 97530 THERAPEUTIC ACTIVITIES: CPT

## 2018-08-23 PROCEDURE — G8979 MOBILITY GOAL STATUS: HCPCS

## 2018-08-23 PROCEDURE — G8987 SELF CARE CURRENT STATUS: HCPCS

## 2018-08-23 PROCEDURE — 85025 COMPLETE CBC W/AUTO DIFF WBC: CPT | Performed by: FAMILY MEDICINE

## 2018-08-23 PROCEDURE — 97167 OT EVAL HIGH COMPLEX 60 MIN: CPT

## 2018-08-23 PROCEDURE — 99222 1ST HOSP IP/OBS MODERATE 55: CPT | Performed by: INTERNAL MEDICINE

## 2018-08-23 PROCEDURE — G8978 MOBILITY CURRENT STATUS: HCPCS

## 2018-08-23 PROCEDURE — G8988 SELF CARE GOAL STATUS: HCPCS

## 2018-08-23 RX ORDER — ATORVASTATIN CALCIUM 10 MG/1
10 TABLET, FILM COATED ORAL
Status: DISCONTINUED | OUTPATIENT
Start: 2018-08-23 | End: 2018-08-25 | Stop reason: HOSPADM

## 2018-08-23 RX ORDER — HYDRALAZINE HYDROCHLORIDE 20 MG/ML
5 INJECTION INTRAMUSCULAR; INTRAVENOUS EVERY 6 HOURS PRN
Status: DISCONTINUED | OUTPATIENT
Start: 2018-08-23 | End: 2018-08-25 | Stop reason: HOSPADM

## 2018-08-23 RX ADMIN — METOPROLOL TARTRATE 12.5 MG: 25 TABLET ORAL at 17:52

## 2018-08-23 RX ADMIN — DEXTRAN 70 AND HYPROMELLOSE 2910 2 DROP: 1; 3 SOLUTION/ DROPS OPHTHALMIC at 09:40

## 2018-08-23 RX ADMIN — PANTOPRAZOLE SODIUM 40 MG: 40 TABLET, DELAYED RELEASE ORAL at 09:39

## 2018-08-23 RX ADMIN — LEVOTHYROXINE SODIUM 25 MCG: 25 TABLET ORAL at 05:35

## 2018-08-23 RX ADMIN — ASPIRIN 325 MG: 325 TABLET ORAL at 09:39

## 2018-08-23 RX ADMIN — METOPROLOL TARTRATE 12.5 MG: 25 TABLET ORAL at 21:05

## 2018-08-23 RX ADMIN — ATORVASTATIN CALCIUM 10 MG: 10 TABLET, FILM COATED ORAL at 17:52

## 2018-08-23 NOTE — CONSULTS
Consultation - Cardiology  Patrizia Boss 80 y o  female MRN: [id number]  Unit/Bed#: -01 Encounter: [id number]    Inpatient consult to Cardiology  Consult performed by: Tatyana Ferreira ordered by: Humberto Dias          Physician Requesting Consult: July Reno MD  Reason for Consult / Principal Problem: Near syncope, abnormal EKG    HPI: Cardiologist Dr Shashank Lewis is a 80y o  year old female who has a history of CAD, hypertension, hyperlipidemia, CVA/TIA, hypothyroidism presenting with episode of near syncope  Patient was lightheaded at nursing facility  Currently denies lightheadedness  Denies chest pain, SOB, back pain  CTA chest does know ascending thoracic aortic aneurysm measuring up to 50 mm  No evidence of aortic dissection  REVIEW OF SYSTEMS:  Constitutional:  Denies fever or chills   Eyes:  Denies change in visual acuity   HENT:  Denies nasal congestion or sore throat   Respiratory:  Denies cough or shortness of breath   Cardiovascular:  Denies chest pain or edema   GI:  Denies abdominal pain, nausea, vomiting, bloody stools or diarrhea   :  Denies dysuria, frequency, difficulty in micturition and nocturia  Musculoskeletal:  Denies back pain or joint pain   Neurologic:  +lightheadedness   Denies headache, focal weakness or sensory changes   Endocrine:  Denies polyuria or polydipsia   Lymphatic:  Denies swollen glands   Psychiatric:  Denies depression or anxiety     Historical Information   Past Medical History:   Diagnosis Date    Atrioventricular block, complete ([address] )     CAD (coronary artery disease)     Cardiac pacemaker in situ     Cerebral infarct [facility] [address] )     due to unspecified occlusion and stenosis of unspecified cerebral artery    Essential (primary) hypertension     Mixed hyperlipidemia     Subarachnoid hemorrhage (HCC)      Past Surgical History:   Procedure Laterality Date    CARDIAC PACEMAKER PLACEMENT       History   Alcohol use Not on file     History   Drug use: Unknown     History   Smoking Status    Former Smoker   Smokeless Tobacco    Not on file     Family History: History reviewed  No pertinent family history  MEDS & ALLERGIES:  all current active meds have been reviewed and current meds:   Current Facility-Administered Medications   Medication Dose Route Frequency    aspirin tablet 325 mg  325 mg Oral Daily    dextran 70-hypromellose (GENTEAL TEARS) 0 1-0 3 % ophthalmic solution 2 drop  2 drop Both Eyes Daily    hydrALAZINE (APRESOLINE) injection 5 mg  5 mg Intravenous Q6H PRN    levothyroxine tablet 25 mcg  25 mcg Oral Daily    metoprolol tartrate (LOPRESSOR) partial tablet 12 5 mg  12 5 mg Oral Q12H [address]    ondansetron (ZOFRAN) injection 4 mg  4 mg Intravenous Q6H PRN    pantoprazole (PROTONIX) EC tablet 40 mg  40 mg Oral Daily        No Known Allergies    OBJECTIVE:  Vitals:   Vitals:    18 0700   BP: (!) 184/72   Pulse: 67   Resp: 18   Temp: 97 9 °F (36 6 °C)   SpO2: 97%     Body mass index is 18 86 kg/m²  Systolic (84KZP), BWF:188 , Min:109 , JSR:923     Diastolic (68SML), UO, Min:56, Max:78      Intake/Output Summary (Last 24 hours) at 18 1438  Last data filed at 18 1256   Gross per 24 hour   Intake              690 ml   Output             1000 ml   Net             -310 ml     Weight (last 2 days)     Date/Time   Weight    18 1500  43 8 (96 56)    18 0955  46 1 (101 63)            Invasive Devices     Peripheral Intravenous Line            Peripheral IV 18 Left Antecubital 1 day    Peripheral IV 18 Right Wrist less than 1 day                PHYSICAL EXAMS:  General:  Patient is not in acute distress, laying in the bed comfortably, awake, alert responding to commands  Head: Normocephalic, Atraumatic  HEENT:  Both pupils normal-size atraumatic, normocephalic, nonicteric  Neck:  JVP not raised   Trachea central  Respiratory:  Bronchovascular breathing all over the chest without any accompaniment  Cardiovascular:  S1-S2 normal without any murmur rails or rub  GI:  Abdomen soft nontender  Liver and spleen normal size  Musculoskeletal:  No edema  Integument:  No skin rashes or ulceration  Lymphatic:  No cervical or inguinal lymphadenopathy  Neurologic:  Patient is awake alert, responding to command, well-oriented to time and place and person    LABORATORY RESULTS:    Results from last 7 days  Lab Units 08/22/18  1028   TROPONIN I ng/mL 0 04     CBC with diff:   Results from last 7 days  Lab Units 08/23/18  0441 08/22/18  1028   WBC Thousand/uL 8 76 8 24   HEMOGLOBIN g/dL 12 4 14 5   HEMATOCRIT % 39 0 45 3   MCV fL 84 85   PLATELETS Thousands/uL 169 174   MCH pg 26 7* 27 2   MCHC g/dL 31 8 32 0   RDW % 15 8* 15 6*   MPV fL 10 6 10 2   NRBC AUTO /100 WBCs 0 0       CMP:  Results from last 7 days  Lab Units 08/23/18  0441 08/22/18  1028   SODIUM mmol/L 138 137   POTASSIUM mmol/L 3 9 4 3   CHLORIDE mmol/L 103 99*   CO2 mmol/L 29 33*   ANION GAP mmol/L 6 5   BUN mg/dL 18 21   CREATININE mg/dL 0 95 1 15   GLUCOSE RANDOM mg/dL 85 106   CALCIUM mg/dL 9 0 9 7   AST U/L  --  14   ALT U/L  --  14   ALK PHOS U/L  --  103   TOTAL PROTEIN g/dL  --  7 7   BILIRUBIN TOTAL mg/dL  --  0 70   EGFR ml/min/1 73sq m 50 40       BMP:  Results from last 7 days  Lab Units 08/23/18  0441 08/22/18  1028   SODIUM mmol/L 138 137   POTASSIUM mmol/L 3 9 4 3   CHLORIDE mmol/L 103 99*   CO2 mmol/L 29 33*   BUN mg/dL 18 21   CREATININE mg/dL 0 95 1 15   GLUCOSE RANDOM mg/dL 85 106   CALCIUM mg/dL 9 0 9 7              Results from last 7 days  Lab Units 08/22/18  1028   MAGNESIUM mg/dL 1 8               Results from last 7 days  Lab Units 08/22/18  1028   INR  0 95       Lipid Profile:   No results found for: CHOL  No results found for: HDL  No results found for: LDLCALC  No results found for: TRIG    Cardiac testing:   No results found for this or any previous visit    No results found for this or any previous visit  No procedure found  No results found for this or any previous visit  Imaging: I have personally reviewed pertinent reports  EKG reviewed personally:   NSR, L axis deviation, RBBB, PACs, T wave inversions in inferolateral leads    Assessment/Plan:  1  Near syncope, abnormal EKG:  -initial troponin negative, continue to trend  -EKG shows NSR, left axis deviation, RBBB, PACs, T-wave inversions in inferolateral leads  -will opt for conservative medical management given advanced age and comorbidities  -echo ordered, will assess EF and regional wall motion abnormalities    2  Ascending aortic aneurysm:  -CTA chest shows ascending thoracic aortic aneurysm measuring up to 50 mm  Also notes questionable hazy next at posterior proximal margin of aneurysm, possibility of leaking of ascending thoracic aortic aneurysm cannot be confidently excluded  -control BP  Will add Lopressor  -will opt for conservative medical management  Patient appears stable on exam   -echo ordered as above    3  CAD:  Conservative medical management given advanced age and comorbidities  Echo ordered as above  Continue aspirin, will add beta-blocker and statin  4   Hypertension:  Last recorded /72, however seems to be an isolated elevated BP reading as previous BP readings have been stable  Will add beta-blocker  Continue to monitor  5   Hyperlipidemia:  Will add statin  6   History of CVA: Continue ASA  Will add statin  Code Status: Level 1 - Full Code    Thank you for allowing us to participate in this patient's care  Counseling / Coordination of Care  Total floor / unit time spent today 35 minutes  Greater than 50% of total time was spent with the patient and / or family counseling and / or coordination of care  A description of the counseling / coordination of care: Review of history, current assessment, development of a plan      Stanley Pinon PA-C  8/23/2018,2:38 PM    Portions of the record may have been created with voice recognition software   Occasional wrong word or "sound a like" substitutions may have occurred due to the inherent limitations of voice recognition software   Read the chart carefully and recognize, using context, where substitutions have occurred

## 2018-08-23 NOTE — SOCIAL WORK
Return phone call received from [facility] from [facility] that confirmed patient has a daughter  Burke Siu is involved in patient care and can be reached at:  [facility]  [phone]    [facility] reports patient has been with [facility] since 2009 was fairly independent with her ADL's, using a rolling walker to ambulate and was on a Puree diet no straws  [facility] stated patient is not on o2, prescriptions filled and administered by [facility] and is seen by a physician Dr Church, last seen in [address] [place] [address] stated there are no reports of MH or SA at this time  [facility] stated patient is able to return if she is able to ambulate  Cm contacted patient daughter obdulio and reviewed obs letter, daughter stated she understands OBS status and desires patient to return to [facility] when stable  Daughter denied any snf placements and also desires patient to discharge back to [facility] without a snf placement  PT will continue to work with patient, cm will continue to follow and assess

## 2018-08-23 NOTE — SOCIAL WORK
vmail left for Donna Hatfield, medical director with [facility]  Patient emergency contact is listed as [facility]     Patient has dementia

## 2018-08-23 NOTE — PLAN OF CARE
Problem: PHYSICAL THERAPY ADULT  Goal: Performs mobility at highest level of function for planned discharge setting  See evaluation for individualized goals  Treatment/Interventions: Functional transfer training, LE strengthening/ROM, Therapeutic exercise, Endurance training, Cognitive reorientation, Patient/family training, Equipment eval/education, Bed mobility, Gait training, Spoke to nursing, Spoke to case management, OT  Equipment Recommended: Joe Jensen       See flowsheet documentation for full assessment, interventions and recommendations  Prognosis: Fair  Problem List: Decreased strength, Decreased endurance, Impaired balance, Decreased mobility, Decreased safety awareness, Decreased cognition, Pain  Assessment: Pt is 80 y o  female seen for PT evaluation on 8/23/2018 s/p admit to [facility] on 8/22/2018 w/ Syncope  PT consulted to assess pt's functional mobility and d/c needs  Order placed for PT eval and tx, w/ up and OOB as tolerated order  Performed at least 2 patient identifiers during session: Name and wristband  Comorbidities affecting pt's physical performance at time of assessment include: dementia, HTN, h/o CVA, cardiac pacemaker, hypothyroidism, HLD, CAD  PTA, pt was independent w/ all functional mobility w/ RW, resident of [facility] and able to perform ADLs independently w/ A for setup per OT evaluation  Personal factors affecting pt at time of IE include: ambulating w/ assistive device, inability to ambulate household distances, inability to navigate level surfaces w/o external assistance, unable to perform dynamic tasks in community, decreased cognition, positive fall history, decreased initiation and engagement, unable to perform physical activity, limited insight into impairments, inability to perform IADLs and inability to perform ADLs   Please find objective findings from PT assessment regarding body systems outlined above with impairments and limitations including weakness, impaired balance, decreased endurance, decreased activity tolerance, decreased functional mobility tolerance, decreased safety awareness, fall risk and decreased cognition, as well as mobility assessment (need for cueing for mobility technique)  The following objective measures performed on IE also reveal limitations: Barthel Index: 35/100 and Modified [place]: 4 (moderate/severe disability)  Pt's clinical presentation is currently unstable/unpredictable seen in pt's presentation of advanced dementia with limited command following  Pt to benefit from continued PT tx to address deficits as defined above and maximize level of functional independent mobility and consistency  From PT/mobility standpoint, recommendation at time of d/c would be STR pending progress in order to facilitate return to PLOF  Barriers to Discharge: Inaccessible home environment, Decreased caregiver support     Recommendation: Short-term skilled PT     PT - OK to Discharge: Yes (when medically cleared, if to STR)    See flowsheet documentation for full assessment

## 2018-08-23 NOTE — OCCUPATIONAL THERAPY NOTE
Occupational Therapy Evaluation        Patient Name: Javon Telles  XLUWD'C Date: 8/23/2018 08/23/18 1130   Note Type   Note type Eval only   Restrictions/Precautions   Weight Bearing Precautions Per Order No   Other Precautions Cognitive; Bed Alarm; Fall Risk;Pain   Pain Assessment   Pain Assessment FLACC   Pain Location Back   Pain Orientation Left;Upper   Pain Rating: FLACC (Rest) - Face 0   Pain Rating: FLACC (Rest) - Legs 0   Pain Rating: FLACC (Rest) - Activity 0   Pain Rating: FLACC (Rest) - Cry 0   Pain Rating: FLACC (Rest) - Consolability 0   Score: FLACC (Rest) 0   Pain Rating: FLACC (Activity) - Face 1   Pain Rating: FLACC (Activity) - Legs 1   Pain Rating: FLACC (Activity) - Activity 1   Pain Rating: FLACC (Activity) - Cry 1   Pain Rating: FLACC (Activity) - Consolability 1   Score: FLACC (Activity) 5   Home Living   Type of Home Other (Comment)  ([address])   Prior Function   Level of [place] Needs assistance with ADLs and functional mobility   Lives With Facility staff   Receives Help From Personal care attendant   ADL Assistance Needs assistance  (set up assist only)   IADLs Needs assistance   Falls in the last 6 months 1 to 4  (per RN at [facility]- multiple unwitnessed falls)   Comments PLOF obtained from [facility], Nurse at [facility] ([facility]) via phone  Patient ambulates independently with RW, toilets self, walks t/o the building independently  Patient is set up for self care tasks, and showers  Lifestyle   Autonomy PLOF obtained from [facility], Nurse at [facility]) via phone  Patient ambulates independently with RW, toilets self, walks t/o the building independently  Patient is set up for self care tasks, and showers      Psychosocial   Psychosocial (WDL) WDL   ADL   Eating Assistance 4  Minimal Assistance   Grooming Assistance 3  Moderate Assistance   UB Bathing Assistance 3  Moderate Assistance   LB Bathing Assistance 2  Maximal Assistance   UB Dressing Assistance 3  Moderate Assistance   LB Dressing Assistance 2  Maximal Assistance   Toileting Assistance  2  Maximal Assistance   Functional Assistance 3  Moderate Assistance   Bed Mobility   Rolling R 3  Moderate assistance   Additional items Assist x 1; Increased time required;Verbal cues;HOB elevated; Bedrails;LE management   Rolling L 3  Moderate assistance   Additional items Assist x 1; Increased time required;Verbal cues;LE management;HOB elevated   Functional Mobility   Functional Mobility 3  Moderate assistance   Additional items Rolling walker   Balance   Static Sitting Fair +   Dynamic Sitting Fair   Activity Tolerance   Activity Tolerance Patient limited by fatigue;Patient limited by pain   RUE Assessment   RUE Assessment X  (AROM grossly WFL, strength deficit)   RUE Strength   RUE Overall Strength Deficits  (3+/5)   LUE Assessment   LUE Assessment X  (AROM grossly WFL, strenght deficit)   LUE Strength   LUE Overall Strength Deficits  (3+/5)   Hand Function   Gross Motor Coordination Impaired   Fine Motor Coordination Impaired   Cognition   Overall Cognitive Status Impaired   Arousal/Participation Alert; Responsive; Cooperative   Attention Attends with cues to redirect   Orientation Level Oriented to person   Memory Decreased recall of biographical information;Decreased short term memory;Decreased recall of recent events   Following Commands Follows one step commands with increased time or repetition   AssessmentPatient is a 80 y o  female seen for OT evaluation s/p admit to [address] [place] [address] on 8/22/2018 w/Syncope  Patient was brought to ED after patient has an episode of near syncope at [facility]  Commorbidities affecting patient's functional performance at time of assessment include: CAD, Cardiac pacemaker,HTN, h/o of Subarachnoid hemorrhage  Orders placed for OT evaluation and treatment Performed at least two patient identifiers during session including name and wristband   Prior to admission, PLOF obtained from Nurse Akira at [facility]) via phone  Patient ambulates independently with RW, toilets self, walks t/o the building independently  Patient is set up for self care tasks, and showers  Personal factors affecting patient at time of initial evaluation include: limited insight into deficits, decreased initiation and engagement and difficulty performing ADLs  Upon evaluation, patient requires minimal and moderate assist for UB ADLs, moderate assist for LB ADLs    Occupational performance is affected by the following deficits: orientation, attention span, decreased functional use of BUEs, decreased muscle strength, impaired gross motor coordination, impaired fine motor coordination, dynamic sit/ stand balance deficit with poor standing tolerance time for self care and functional mobility, decreased activity tolerance, impaired memory, impaired problem solving, decreased safety awareness and postural control and postural alignment deficit, requiring external assistance to complete transitional movements  Patient to benefit from continued Occupational Therapy treatment while in the hospital to address deficits as defined above and maximize level of functional independence with ADLs and functional mobility  Occupational Performance areas to address include: bathing/ shower, dressing, toilet hygiene, transfer to all surfaces, functional mobility, emergency response, IADLs: safety procedures, Leisure Participation and Social participation  From OT standpoint, recommendation at time of d/c would be Short Term Rehab  Limitation Decreased ADL status; Decreased UE ROM; Decreased UE strength;Decreased Safe judgement during ADL;Decreased cognition;Decreased endurance;Decreased self-care trans;Decreased fine motor control   Prognosis Good   Plan   Treatment Interventions ADL retraining;Functional transfer training;UE strengthening/ROM; Endurance training;Patient/family training;Equipment evaluation/education; Fine motor coordination activities; Compensatory technique education;Continued evaluation; Activityengagement; Energy conservation   Goal Expiration Date 09/06/18   OT Frequency 3-5x/wk   Recommendation   OT Discharge Recommendation Short Term Rehab   OT - OK to Discharge Yes  (STR when medically cleared)   Barthel Index   Feeding 5   Bathing 0   Grooming Score 0   Dressing Score 0   Bladder Score 10   Bowels Score 10   Toilet Use Score 5   Transfers (Bed/Chair) Score 5   Mobility (Level Surface) Score 0   Stairs Score 0   Barthel Index Score 35   Modified [place] Scale   Modified Solis Scale 4       Occupational Therapy goals: In 7-14 days:     1- Patient will verbalize and demonstrate use of energy conservation/ deep breathing technique and work simplification skills during functional activity with no verbal cues  2-Patient will verbalize and demonstrate good body mechanics and joint protection techniques during  ADLs/ IADLs with no verbal cues  3- Patient will increase OOB/ sitting tolerance to 2-4 hours per day for increased participation in self care and leisure tasks with no s/s of exertion  4-Patient will increase standing tolerance time to 5  minutes with unilateral UE support to complete sink level ADLs@ mod I level   5- Patient will increase sitting tolerance at edge of bed to 20 minutes to complete UB ADLs @ set up assist level  6- Patient will transfer bed to Chair / toilet at Set up assist level with AD as indicated  7- Patient will complete UB ADLs with set up assist  8- Patient will complete LB ADLs with min assist with the use of adaptive equipment  9- Patient will complete toileting hygiene with set up assist/ supervision for thoroughness    10-Patient/ Family  will demonstrate competency with UE Home Exercise Program

## 2018-08-23 NOTE — PHYSICIAN ADVISOR
Current patient class: Observation  The patient is currently on Hospital Day: 2 at [address]      This patient was originally admitted to the hospital under observation status  After admission the patient was reevaluated and determined to require further hospitalization  The patient is now documented to require at least a 2nd midnight in the hospital  As such the patient is now expected to satisfy the 2 midnight benchmark and is therefore eligible for inpatient admission  After review of the relevant documentation, labs, vital signs and test results, the patient is appropriate for INPATIENT ADMISSION  Admission to the hospital as an inpatient is a complex decision making process which requires the practitioner to consider the patients presenting complaint, history and physical examination and all relevant testing  With this in mind, in this case, the patient was deemed appropriate for INPATIENT ADMISSION  After review of the documentation and testing available at the time of the admission I concur with this clinical determination of medical necessity  Rationale is as follows: The patient is a 80 yrs Female who presented to the ED at 8/22/2018  9:52 AM with a chief complaint of near syncope  Evaluation in the ER revealed abnormal ECG with right bundle branch block, PACs, T-wave inversions and left axis deviation  On CT angiogram of the chest patient was found to have thoracic ascending aneurysm measuring 50 mm with questionable haziness with uncertain the for possible leaking aneurysm  Patient is not a surgical candidate and medical management has been recommended  Cardiology is consulted with recommendation for starting statin and Lopressor especially in light of patient's significantly elevated blood pressures  Patient is continued on telemetry monitoring  Echocardiogram is pending    Patient will remain hospitalized for monitoring of possible leaky thoracic aneurysm with pending echocardiogram and close monitoring of her blood pressures on new blood pressure medication  Inpatient hospitalization is warranted and patient will remain hospitalized for over 2 midnights      The patients vitals on arrival were ED Triage Vitals [08/22/18 0955]   Temperature Pulse Respirations Blood Pressure SpO2   (!) 97 4 °F (36 3 °C) 87 18 (!) 177/74 100 %      Temp Source Heart Rate Source Patient Position - Orthostatic VS BP Location FiO2 (%)   Oral Monitor Lying Left arm --      Pain Score       4           Past Medical History:   Diagnosis Date    Atrioventricular block, complete (HCC)     CAD (coronary artery disease)     Cardiac pacemaker in situ     Cerebral infarct (HCC)     due to unspecified occlusion and stenosis of unspecified cerebral artery    Essential (primary) hypertension     Mixed hyperlipidemia     Subarachnoid hemorrhage (HCC)      Past Surgical History:   Procedure Laterality Date    CARDIAC PACEMAKER PLACEMENT         The patient was admitted to the hospital at N/A on N/A for the following diagnosis:  Syncope [R55]  Abnormal EKG [R94 31]  Ascending aortic aneurysm (HCC) [I71 2]  Near syncope [R55]    Consults have been placed to:   IP CONSULT TO CASE MANAGEMENT  IP CONSULT TO CARDIOLOGY    Vitals:    08/22/18 2300 08/23/18 0300 08/23/18 0700 08/23/18 1529   BP: 112/56 109/78 (!) 184/72 118/77   BP Location: Right arm Right arm Left arm Left arm   Pulse: 62 64 67 61   Resp: 18 18 18 18   Temp: 98 7 °F (37 1 °C) 97 9 °F (36 6 °C) 97 9 °F (36 6 °C) 97 8 °F (36 6 °C)   TempSrc: Oral Axillary Axillary Axillary   SpO2: 94% 95% 97% 98%   Weight:       Height:           Most recent labs:    Recent Labs      08/22/18   1028  08/23/18   0441   WBC  8 24  8 76   HGB  14 5  12 4   HCT  45 3  39 0   PLT  174  169   K  4 3  3 9   NA  137  138   CALCIUM  9 7  9 0   BUN  21  18   CREATININE  1 15  0 95   LIPASE  465*   --    INR  0 95   --    TROPONINI  0 04   --    AST  14   -- ALT  14   --    ALKPHOS  103   --    BILITOT  0 70   --        Scheduled Meds:  Current Facility-Administered Medications:  aspirin 325 mg Oral Daily Ish Weeks MD   atorvastatin 10 mg Oral Daily With Wolf Hernandez PA-C   dextran 70-hypromellose 2 drop Both Eyes Daily Ish Weeks MD   hydrALAZINE 5 mg Intravenous Q6H PRN Brennen Flores MD   levothyroxine 25 mcg Oral Daily Pavithra Cadena MD   metoprolol tartrate 12 5 mg Oral Q12H [address] Leonardo Goldsmith PA-C   ondansetron 4 mg Intravenous Q6H PRN Pavithra Cadena MD   pantoprazole 40 mg Oral Daily Pavithra Cadena MD     Continuous Infusions:   PRN Meds: hydrALAZINE    ondansetron    Surgical procedures (if appropriate):

## 2018-08-23 NOTE — PHYSICAL THERAPY NOTE
Physical Therapy Evaluation     Patient's Name: Kadi Doyle    Admitting Diagnosis  Syncope [R55]  Abnormal EKG [R94 31]  Ascending aortic aneurysm ([facility] ) [I71 2]  Near syncope [R55]    Problem List  Patient Active Problem List   Diagnosis    Mixed hyperlipidemia    Hypothyroidism    Complete atrioventricular block ([facility] )    Cardiac pacemaker in situ    [email or web address]    Cardiac murmur    Dyslipidemia    Hypertension    Syncope    Dementia       Past Medical History  Past Medical History:   Diagnosis Date    [facility]ovent[facility] block, complete ([facility] )     CAD (coronary artery disease)     Cardiac pacemaker in situ     Cerebral infarct ([facility] )     due to unspecified occlusion and stenosis of unspecified cerebral artery    Essential (primary) hypertension     Mixed hyperlipidemia     Subarachnoid hemorrhage (HCC)        Past Surgical History  Past Surgical History:   Procedure Laterality Date    CARDIAC PACEMAKER PLACEMENT          08/23/18 1139   Note Type   Note type Eval only   Pain Assessment   Pain Assessment 0-10   Pain Score 8   Pain Type Acute pain   Pain Location Back   Pain Orientation Upper   Home Living   Type of Home Assisted living  ([place] [address])   Home Equipment Walker   Prior Function   Level of [place] Independent with ADLs and functional mobility  (independent w/ RW)   Lives With Facility staff   Receives Help From Personal care attendant   ADL Assistance Needs assistance  (up assist only)   IADLs Needs assistance   Falls in the last 6 months (per RN at [facility]- multiple unwitnessed falls)   Comments PLOF and social hx obtained from OT evaluation, pt's chart as pt is poor historian   Restrictions/Precautions   Weight Bearing Precautions Per Order No   Other Precautions Cognitive; Chair Alarm; Bed Alarm;Telemetry; Fall Risk;Pain   General   Family/Caregiver Present No   Cognition   Overall Cognitive Status Impaired   Arousal/Participation Cooperative Orientation Level Oriented to person;Oriented to place; Disoriented to time;Disoriented to situation   Memory Decreased recall of biographical information;Decreased short term memory;Decreased recall of recent events;Decreased recall of precautions   Following Commands Follows one step commands with increased time or repetition   Comments [place], pt agreeable to PT session   RUE Assessment   RUE Assessment WFL  (AROM)   LUE Assessment   LUE Assessment WFL  (AROM)   RLE Assessment   RLE Assessment WFL  (4/5)   LLE Assessment   LLE Assessment WFL  (4/5)   Coordination   Movements are Fluid and Coordinated 1   Sensation (pt denies any numbness)   Bed Mobility   Rolling L 5  Supervision   Additional items Assist x 1; Increased time required;Verbal cues   Supine to Sit 5  Supervision   Additional items Assist x 1;Bedrails; Increased time required;Verbal cues   Transfers   Sit to Stand 4  Minimal assistance   Additional items Assist x 1; Increased time required;Verbal cues; Impulsive   Stand to Sit 4  Minimal assistance   Additional items Assist x 1;Verbal cues; Increased time required   Ambulation/Elevation   Gait pattern Decreased foot clearance;Shuffling; Short stride; Step to;Excessively slow  (lateral LOB)   Gait Assistance 4  Minimal assist   Additional items Assist x 1;Verbal cues; Tactile cues   Assistive Device (HHA)   Distance 15' x2   Balance   Static Sitting Fair +   Dynamic Sitting Fair   Static Standing Fair -   Dynamic Standing Poor +   Ambulatory Poor +   Endurance Deficit   Endurance Deficit Yes   Activity Tolerance   Activity Tolerance Patient limited by fatigue;Patient limited by pain;Treatment limited secondary to medical complications (Comment)  (baseline dementia)   Medical Staff Made Aware pt w/ up and OOB as tolerated order   Nurse Made Aware Yes, RN Dimas Grant verbalized pt appropriate for PT session, verbalized pt appropriate for OOB mobility   Assessment   Prognosis Fair   Problem List Decreased strength;Decreased endurance; Impaired balance;Decreased mobility; Decreased safety awareness;Decreased cognition;Pain   Assessment Pt is 80 y o  female seen for PT evaluation on 8/23/2018 s/p admit to [facility] on 8/22/2018 w/ Syncope  PT consulted to assess pt's functional mobility and d/c needs  Order placed for PT eval and tx, w/ up and OOB as tolerated order  Performed at least 2 patient identifiers during session: Name and wristband  Comorbidities affecting pt's physical performance at time of assessment include: dementia, HTN, h/o CVA, cardiac pacemaker, hypothyroidism, HLD, CAD  PTA, pt was independent w/ all functional mobility w/ RW, resident of [facility] and able to perform ADLs independently w/ A for setup per OT evaluation  Personal factors affecting pt at time of IE include: ambulating w/ assistive device, inability to ambulate household distances, inability to navigate level surfaces w/o external assistance, unable to perform dynamic tasks in community, decreased cognition, positive fall history, decreased initiation and engagement, unable to perform physical activity, limited insight into impairments, inability to perform IADLs and inability to perform ADLs  Please find objective findings from PT assessment regarding body systems outlined above with impairments and limitations including weakness, impaired balance, decreased endurance, decreased activity tolerance, decreased functional mobility tolerance, decreased safety awareness, fall risk and decreased cognition, as well as mobility assessment (need for cueing for mobility technique)  The following objective measures performed on IE also reveal limitations: Barthel Index: 35/100 and Modified Solis: 4 (moderate/severe disability)  Pt's clinical presentation is currently unstable/unpredictable seen in pt's presentation of advanced dementia with limited command following   Pt to benefit from continued PT tx to address deficits as defined above and maximize level of functional independent mobility and consistency  From PT/mobility standpoint, recommendation at time of d/c would be STR pending progress in order to facilitate return to PLOF  Barriers to Discharge Inaccessible home environment;Decreased caregiver support   Goals   Patient Goals "to eat lunch"   STG Expiration Date 09/02/18   Short Term Goal #1 In 7-10 days: Increase bilateral LE strength 1/2 grade to facilitate independent mobility, Perform all bed mobility tasks modified independent to decrease caregiver burden, Perform all transfers modified independent to improve independence, Ambulate > 150 ft  with least restrictive assistive device with distant S w/o LOB and w/ normalized gait pattern 100% of the time, Increase all balance 1/2 grade to decrease risk for falls, Complete exercise program independently and Tolerate 4 hr OOB to faciliate upright tolerance   Treatment Day 0   Plan   Treatment/Interventions Functional transfer training;LE strengthening/ROM; Therapeutic exercise; Endurance training;Cognitive reorientation;Patient/family training;Equipment eval/education; Bed mobility;Gait training;Spoke to nursing;Spoke to case management;OT   PT Frequency 5x/wk   Recommendation   Recommendation Short-term skilled PT   Equipment Recommended Walker   PT - OK to Discharge Yes  (when medically cleared, if to STR)   Modified [place] Scale   Modified [place] Scale 4   Barthel Index   Feeding 5   Bathing 0   Grooming Score 0   Dressing Score 0   Bladder Score 10   Bowels Score 10   Toilet Use Score 5   Transfers (Bed/Chair) Score 5   Mobility (Level Surface) Score 0   Stairs Score 0   Barthel Index Score 35     Physical Therapy Treatment Note  Time In: 11:50  Time Out: 11:58  Total Time: 8 min     S:  Pt agreeable to PT treatment session s/p PT eval, in no apparent distress and responsive      O:  Pt seen for PT treatment session this date with interventions consisting of therapeutic activity consisting of training: sit<>stand transfers, static standing tolerance for 1-2 minutes w/ B UE support and vc and tactile cues for static standing posture faciliation       A:  Pt requiring min A for functional STS transfers from various surfaces and maximal TC/VC for proper use of railing next to toilet for functional transfers  Static standing tolerance w/ maximal UE support x 1-2 mins  Post session: pt returned back to recliner, chair alarm engaged, all needs in reach and RN notified of session findings/recommendations, PCA in room assisting pt w/ lunch      P:  Continue to recommend STR at time of d/c in order to maximize pt's functional independence and safety w/ mobility  Pt continues to be functioning below baseline level, and remains limited 2* factors listed above  PT will continue to see pt while here in order to address the deficits listed above and provide interventions consistent w/ POC in effort to achieve STGs      Analilia Renee, PT      Analilia Renee [place]

## 2018-08-23 NOTE — PLAN OF CARE
Problem: DISCHARGE PLANNING - CARE MANAGEMENT  Goal: Discharge to post-acute care or home with appropriate resources  INTERVENTIONS:  - Conduct assessment to determine patient/family and health care team treatment goals, and need for post-acute services based on payer coverage, community resources, and patient preferences, and barriers to discharge  - Address psychosocial, clinical, and financial barriers to discharge as identified in assessment in conjunction with the patient/family and health care team  - Arrange appropriate level of post-acute services according to patients   needs and preference and payer coverage in collaboration with the physician and health care team  - Communicate with and update the patient/family, physician, and health care team regarding progress on the discharge plan  - Arrange appropriate transportation to post-acute venues  Outcome: Progressing  From [place] personal care home  OBS status  Pt will continue to evaluate

## 2018-08-23 NOTE — CASE MANAGEMENT
Initial Clinical Review    Admission: Date/Time/Statement: OBS  8/22  1241 converted to IP on 8/23 @ 1658      Admitting Physician KAM HIDALGO    Level of Care Med Surg    Estimated length of stay More than 2 Midnights    Certification I certify that inpatient services are medically necessary for this patient for a duration of greater than two midnights  See H&P and MD Progress Notes for additional information about the patient's course of treatment  ED: Date/Time/Mode of Arrival:   ED Arrival Information     Expected Arrival Acuity Means of Arrival Escorted By Service Admission Type    8/22/2018 09:52 8/22/2018 09:52 Urgent Ambulance [address]  [facility] General Medicine Urgent    Arrival Complaint    -          Chief Complaint:   Chief Complaint   Patient presents with    Syncope     Pt was brought in by EMS from [address] [place] [address] due to a near syncopal event this morning at breakfast  Pt currently complains of L flank pain  History of Illness: Kanwal Ferguson is a 80 y o  female  who was brought to ED after patient has an episode of near syncope at [facility]  Information gather from EMR  Patient doesn't know why she is here  She states maybe she was having some breathing problem       ED Vital Signs:   ED Triage Vitals [08/22/18 0955]   Temperature Pulse Respirations Blood Pressure SpO2   (!) 97 4 °F (36 3 °C) 87 18 (!) 177/74 100 %      Temp Source Heart Rate Source Patient Position - Orthostatic VS BP Location FiO2 (%)   Oral Monitor Lying Left arm --      Pain Score       4        Wt Readings from Last 1 Encounters:   08/22/18 43 8 kg (96 lb 9 oz)       Vital Signs (abnormal):   08/22/18 1330  --  59  20  122/59  --  92 %  None (Room air)  Lying   08/22/18 1300  --  60  18  128/58  --  99 %  None (Room air)  Lying   08/22/18 1100  --  82  20   179/77  --  99 %  None (Room air)  Lying     Abnormal Labs/Diagnostic Test Results: cl  99, Co2   33, lipase  465  CT head- No acute intracranial abnormality     Multiple chronic infarctions as described  CTA chest -    Ascending thoracic aortic aneurysm measuring up to 50 mm   Questionable haziness at the posterior proximal margin of the aneurysm within the mediastinum on this examination which is unfortunately significantly limited as a result of streak artifact   related to pacemaker generator and because the patient could not bring arms above head or tolerate breath-hold examination   The possibility of leaking of ascending thoracic aortic aneurysm cannot therefore unfortunately not be confidently excluded on   the basis of the current examination   There is no significant sized mediastinal hematoma and no evidence of active extravasation of injected intravenous contrast       EKG- NSR w/ PAcs    ED Treatment:   Medication Administration from 08/22/2018 0952 to 08/22/2018 1441       Date/Time Order Dose Route Action Action by Comments     08/22/2018 1038 lidocaine (LIDODERM) 5 % patch 1 patch 1 patch Transdermal Medication Applied Cynthia Joaquin RN      08/22/2018 1038 acetaminophen (TYLENOL) tablet 650 mg 650 mg Oral Given Cynthia Joaquin RN      08/22/2018 1031 sodium chloride 0 9 % bolus 1,000 mL 1,000 mL Intravenous New Bag Cynthia Joaquin RN      08/22/2018 1038 ipratropium-albuterol (DUO-NEB) 0 5-2 5 mg/3 mL inhalation solution 3 mL 3 mL Nebulization Given Cynthia Joaquin RN      08/22/2018 1129 iodixanol (VISIPAQUE) 320 MG/ML injection 85 mL 85 mL Intravenous Given Lea Barrett      08/22/2018 1212 labetalol (NORMODYNE) injection 10 mg 10 mg Intravenous Given Monica Irwin RN           Past Medical/Surgical History:    Active Ambulatory Problems     Diagnosis Date Noted    Mixed hyperlipidemia 08/15/2018    Hypothyroidism 08/15/2018    Complete atrioventricular block ([address] ) 08/15/2018    Cardiac pacemaker in situ 08/15/2018    CVA (cerebral vascular accident) ([address] ) 08/15/2018    Cardiac murmur 08/15/2018    Dyslipidemia 08/15/2018    Hypertension 08/15/2018     Resolved Ambulatory Problems     Diagnosis Date Noted    No Resolved Ambulatory Problems     Past Medical History:   Diagnosis Date    [facility]riovent[facility] block, complete ([facility]     CAD (coronary artery disease)     Cardiac pacemaker in situ     Cerebral infarct ([facility] )     Essential (primary) hypertension     Mixed hyperlipidemia     Subarachnoid hemorrhage (HCC)        Admitting Diagnosis: Syncope [R55]  Abnormal EKG [R94 31]  Ascending aortic aneurysm (HCC) [I71 2]  Near syncope [R55]    Age/Sex: 80 y o  female    Assessment/Plan:   * Syncope   Assessment & Plan     Near-syncope  Aspirin information gathered was most likely related to vasovagal syncope  CTA chest Ascending thoracic aortic aneurysm measuring up to 50 mm   Questionable haziness at the posterior proximal margin of the aneurysm within the mediastinum on this examination which is unfortunately significantly limited as a result of streak artifact  related to pacemaker generator and because the patient could not bring arms above head or tolerate breath-hold examination   The possibility of leaking of ascending thoracic aortic aneurysm cannot therefore unfortunately not be confidently excluded   -follow-up precaution  -continue telemetry  -Orthostatic blood pressure  -Blood pressure control  Discharge planning        Hypertension   Assessment & Plan     Blood pressure is stable for age  Continue home medication        CVA (cerebral vascular accident) [facility])   Assessment & Plan     Previously  Continue  secondary prevention        Cardiac pacemaker in situ   Assessment & Plan     No acute events          Hypothyroidism   Assessment & Plan     Continue levothyroxine         Anticipated Length of Stay:  Patient will be admitted on an Observation basis with an anticipated length of stay of  < 2 midnights     Justification for Hospital Stay: near syncope       Admission Orders:  Scheduled Meds:   Current Facility-Administered Medications:  aspirin 325 mg Oral Daily Ish Weeks MD   dextran 70-hypromellose 2 drop Both Eyes Daily Manoj Hernández MD   levothyroxine 25 mcg Oral Daily Manoj Hernández MD   ondansetron 4 mg Intravenous Q6H PRN Manoj Hernández MD   pantoprazole 40 mg Oral Daily Manoj Hernández MD     Continuous Infusions:    PRN Meds: ondansetron    Tele   OT PT eval   Orthostatic BP   Neuro checks q4hr   Tele   Dysphagia eval   Inc spirom   Up and OOB as tyrese  SCD  8/23 CBC , BMP

## 2018-08-23 NOTE — PLAN OF CARE
Problem: OCCUPATIONAL THERAPY ADULT  Goal: Performs self-care activities at highest level of function for planned discharge setting  See evaluation for individualized goals  Treatment Interventions: ADL retraining, Functional transfer training, UE strengthening/ROM, Endurance training, Patient/family training, Equipment evaluation/education, Fine motor coordination activities, Compensatory technique education, Continued evaluation, Activityengagement, Energy conservation          See flowsheet documentation for full assessment, interventions and recommendations  Limitation: Decreased ADL status, Decreased UE ROM, Decreased UE strength, Decreased Safe judgement during ADL, Decreased cognition, Decreased endurance, Decreased self-care trans, Decreased fine motor control  Prognosis: Good  Assessment: Patient is a 80 y o  female seen for OT evaluation s/p admit to [id number] [place] [address] on 8/22/2018 w/Syncope  Patient was brought to ED after patient has an episode of near syncope at [facility]  Commorbidities affecting patient's functional performance at time of assessment include: CAD, Cardiac pacemaker,HTN, h/o of Subarachnoid hemorrhage  Orders placed for OT evaluation and treatment Performed at least two patient identifiers during session including name and wristband  Prior to admission, PLOF obtained from Brisa Mena Nurse at [facility]) via phone  Patient ambulates independently with RW, toilets self, walks t/o the building independently  Patient is set up for self care tasks, and showers  Personal factors affecting patient at time of initial evaluation include: limited insight into deficits, decreased initiation and engagement and difficulty performing ADLs  Upon evaluation, patient requires minimal and moderate assist for UB ADLs, moderate assist for LB ADLs     Occupational performance is affected by the following deficits: orientation, attention span, decreased functional use of BUEs, decreased muscle strength, impaired gross motor coordination, impaired fine motor coordination, dynamic sit/ stand balance deficit with poor standing tolerance time for self care and functional mobility, decreased activity tolerance, impaired memory, impaired problem solving, decreased safety awareness and postural control and postural alignment deficit, requiring external assistance to complete transitional movements  Patient to benefit from continued Occupational Therapy treatment while in the hospital to address deficits as defined above and maximize level of functional independence with ADLs and functional mobility  Occupational Performance areas to address include: bathing/ shower, dressing, toilet hygiene, transfer to all surfaces, functional mobility, emergency response, IADLs: safety procedures, Leisure Participation and Social participation  From OT standpoint, recommendation at time of d/c would be Short Term Rehab        OT Discharge Recommendation: Short Term Rehab  OT - OK to Discharge: Yes (STR when medically cleared)

## 2018-08-23 NOTE — PROGRESS NOTES
[facility] Progress Note  Patient: Sasha Best 80 y o  female   MRN: [id number]  PCP: Ingris Martinez MD  Unit/Bed#: -[facility] Encounter: [id number]  Date Of Visit: 08/23/18    Assessment:    Principal Problem:    Syncope  Active Problems:    Hypothyroidism    Cardiac pacemaker in situ    CVA (cerebral vascular accident) ([address] )    Hypertension    Dementia      Plan:    * Syncope   Assessment & Plan     Near-syncope  Aspirin information gathered was most likely related to vasovagal syncope  CTA chest Ascending thoracic aortic aneurysm measuring up to 50 mm   Questionable haziness at the posterior proximal margin of the aneurysm within the mediastinum on this examination which is unfortunately significantly limited as a result of streak artifact  related to pacemaker generator and because the patient could not bring arms above head or tolerate breath-hold examination   The possibility of leaking of ascending thoracic aortic aneurysm cannot therefore unfortunately not be confidently excluded; -I spoke with Dr Court Larry regarding possible leaking thoracic aortic aneurysm-he does not recommend any intervention at this time-says even with leaking would not be a surgical candidate; he recommends following her pain and she denies any chest pain presently if any change could repeat ct scan    -follow-up precaution  -continue telemetry  -Orthostatic blood pressure  -Blood pressure control  Discharge planning     S/p pacemaker-cardiology consult       Hypertension   Assessment & Plan     Blood pressure is stable for age    Continue home medication          CVA (cerebral vascular accident) [facility])   Assessment & Plan     Previously  Continue  secondary prevention          Cardiac pacemaker in situ   Assessment & Plan     No acute events          Hypothyroidism   Assessment & Plan     Continue levothyroxine            Ss consult for discharge planning         VTE Pharmacologic Prophylaxis:   Pharmacologic: Pharmacologic VTE Prophylaxis contraindicated due to ?leaking thoracic aneurysm  Mechanical VTE Prophylaxis in Place: Yes    Patient Centered Rounds: I have performed bedside rounds with nursing staff today  Discussions with Specialists or Other Care Team Provider:     Education and Discussions with Family / Patient:     Time Spent for Care: 30 minutes  More than 50% of total time spent on counseling and coordination of care as described above  Current Length of Stay: 0 day(s)    Current Patient Status: Observation   Certification Statement: The patient will continue to require additional inpatient hospital stay due to thoracic aneurysm    Discharge Plan: pending      Code Status: Level 1 - Full Code      Subjective:   Sleeping rouses to voice  Objective:     Vitals:   Temp (24hrs), Av 1 °F (36 7 °C), Min:97 4 °F (36 3 °C), Max:98 7 °F (37 1 °C)    HR:  [59-87] 67  Resp:  [18-20] 18  BP: (109-184)/(56-78) 184/72  SpO2:  [92 %-100 %] 97 %  Body mass index is 18 86 kg/m²  Input and Output Summary (last 24 hours): Intake/Output Summary (Last 24 hours) at 18 0811  Last data filed at 18 0444   Gross per 24 hour   Intake              150 ml   Output              500 ml   Net             -350 ml       Physical Exam:     Physical Exam   Constitutional: She appears well-developed and well-nourished  HENT:   Head: Normocephalic and atraumatic  Eyes: Pupils are equal, round, and reactive to light  Cardiovascular: Normal rate and regular rhythm  Pulmonary/Chest: Effort normal and breath sounds normal    Abdominal: Soft  Bowel sounds are normal    Musculoskeletal: She exhibits no edema         Additional Data:     Labs:      Results from last 7 days  Lab Units 18  0441   WBC Thousand/uL 8 76   HEMOGLOBIN g/dL 12 4   HEMATOCRIT % 39 0   PLATELETS Thousands/uL 169   NEUTROS PCT % 63   LYMPHS PCT % 21   MONOS PCT % 15*   EOS PCT % 1       Results from last 7 days  Lab Units 08/23/18  0441 08/22/18  1028   SODIUM mmol/L 138 137   POTASSIUM mmol/L 3 9 4 3   CHLORIDE mmol/L 103 99*   CO2 mmol/L 29 33*   BUN mg/dL 18 21   CREATININE mg/dL 0 95 1 15   CALCIUM mg/dL 9 0 9 7   TOTAL PROTEIN g/dL  --  7 7   BILIRUBIN TOTAL mg/dL  --  0 70   ALK PHOS U/L  --  103   ALT U/L  --  14   AST U/L  --  14   GLUCOSE RANDOM mg/dL 85 106       Results from last 7 days  Lab Units 08/22/18  1028   INR  0 95       * I Have Reviewed All Lab Data Listed Above  * Additional Pertinent Lab Tests Reviewed: All Labs Within Last 24 Hours Reviewed    Imaging:    Imaging Reports Reviewed Today Include:   Imaging Personally Reviewed by Myself Includes:      Recent Cultures (last 7 days):           Last 24 Hours Medication List:     Current Facility-Administered Medications:  aspirin 325 mg Oral Daily Alen Favre, MD   dextran 70-hypromellose 2 drop Both Eyes Daily Alen Favre, MD   levothyroxine 25 mcg Oral Daily Alen Favre, MD   ondansetron 4 mg Intravenous Q6H PRN Alen Favre, MD   pantoprazole 40 mg Oral Daily Alen Favre, MD        Today, Patient Was Seen By: Honorio Gutierres MD    ** Please Note: Dragon 360 Dictation voice to text software may have been used in the creation of this document   **

## 2018-08-24 ENCOUNTER — APPOINTMENT (INPATIENT)
Dept: NON INVASIVE DIAGNOSTICS | Facility: HOSPITAL | Age: 83
DRG: 301 | End: 2018-08-24
Payer: MEDICARE

## 2018-08-24 PROBLEM — R55 SYNCOPE: Status: RESOLVED | Noted: 2018-08-22 | Resolved: 2018-08-24

## 2018-08-24 LAB
ATRIAL RATE: 62 BPM
P AXIS: 46 DEGREES
PR INTERVAL: 220 MS
QRS AXIS: -47 DEGREES
QRSD INTERVAL: 116 MS
QT INTERVAL: 430 MS
QTC INTERVAL: 436 MS
T WAVE AXIS: -58 DEGREES
TROPONIN I SERPL-MCNC: 0.05 NG/ML
VENTRICULAR RATE: 62 BPM

## 2018-08-24 PROCEDURE — 99232 SBSQ HOSP IP/OBS MODERATE 35: CPT | Performed by: GENERAL PRACTICE

## 2018-08-24 PROCEDURE — 93005 ELECTROCARDIOGRAM TRACING: CPT

## 2018-08-24 PROCEDURE — 93010 ELECTROCARDIOGRAM REPORT: CPT | Performed by: INTERNAL MEDICINE

## 2018-08-24 PROCEDURE — 97110 THERAPEUTIC EXERCISES: CPT

## 2018-08-24 PROCEDURE — 93306 TTE W/DOPPLER COMPLETE: CPT | Performed by: INTERNAL MEDICINE

## 2018-08-24 PROCEDURE — 93306 TTE W/DOPPLER COMPLETE: CPT

## 2018-08-24 PROCEDURE — 97116 GAIT TRAINING THERAPY: CPT

## 2018-08-24 PROCEDURE — 84484 ASSAY OF TROPONIN QUANT: CPT | Performed by: PHYSICIAN ASSISTANT

## 2018-08-24 PROCEDURE — 99232 SBSQ HOSP IP/OBS MODERATE 35: CPT | Performed by: INTERNAL MEDICINE

## 2018-08-24 RX ORDER — AMLODIPINE BESYLATE 2.5 MG/1
2.5 TABLET ORAL DAILY
Status: DISCONTINUED | OUTPATIENT
Start: 2018-08-24 | End: 2018-08-25

## 2018-08-24 RX ADMIN — PANTOPRAZOLE SODIUM 40 MG: 40 TABLET, DELAYED RELEASE ORAL at 10:55

## 2018-08-24 RX ADMIN — LEVOTHYROXINE SODIUM 25 MCG: 25 TABLET ORAL at 05:40

## 2018-08-24 RX ADMIN — METOPROLOL TARTRATE 12.5 MG: 25 TABLET ORAL at 20:46

## 2018-08-24 RX ADMIN — METOPROLOL TARTRATE 12.5 MG: 25 TABLET ORAL at 10:55

## 2018-08-24 RX ADMIN — DEXTRAN 70 AND HYPROMELLOSE 2910 2 DROP: 1; 3 SOLUTION/ DROPS OPHTHALMIC at 10:59

## 2018-08-24 RX ADMIN — NITROGLYCERIN 0.5 INCH: 20 OINTMENT TOPICAL at 12:12

## 2018-08-24 RX ADMIN — ATORVASTATIN CALCIUM 10 MG: 10 TABLET, FILM COATED ORAL at 16:35

## 2018-08-24 RX ADMIN — AMLODIPINE BESYLATE 2.5 MG: 2.5 TABLET ORAL at 10:58

## 2018-08-24 RX ADMIN — ASPIRIN 325 MG: 325 TABLET ORAL at 10:55

## 2018-08-24 NOTE — PLAN OF CARE
Problem: PHYSICAL THERAPY ADULT  Goal: Performs mobility at highest level of function for planned discharge setting  See evaluation for individualized goals  Treatment/Interventions: Functional transfer training, LE strengthening/ROM, Therapeutic exercise, Endurance training, Cognitive reorientation, Patient/family training, Equipment eval/education, Bed mobility, Gait training, Spoke to nursing, Spoke to case management, OT  Equipment Recommended: Dain Robles       See flowsheet documentation for full assessment, interventions and recommendations  Outcome: Progressing  Prognosis: Fair  Problem List: Decreased strength, Decreased endurance, Impaired balance, Decreased mobility, Decreased safety awareness, Decreased cognition, Pain  Assessment: Pt seen for PT treatment session this date with interventions consisting of gait training w/ emphasis on improving pt's ability to ambulate level surfaces x 30 ft with mod A provided by therapist with RW, Therapeutic exercise consisting of: AROM 10 reps B LE in sitting position and therapeutic activity consisting of training: bed mobility, supine<>sit transfers, sit<>stand transfers and vc and tactile cues for static standing posture faciliation  Pt agreeable to PT treatment session upon arrival, pt found supine in bed w/ HOB elevated, in no apparent distress, A&O x 1 and responsive  In comparison to previous session, pt with improvements in demonstrating ability to perform level surface amb x 30', however requiring min/mod A for functional mobility tasks at this time 2/2 unsteadiness and LOB  Post session: pt returned back to recliner, chair alarm engaged, all needs in reach and RN notified of session findings/recommendations  Continue to recommend STR at time of d/c in order to maximize pt's functional independence and safety w/ mobility  Pt continues to be functioning below baseline level, and remains limited 2* factors listed above   PT will continue to see pt while here in order to address the deficits listed above and provide interventions consistent w/ POC in effort to achieve STGs  Barriers to Discharge: Inaccessible home environment, Decreased caregiver support     Recommendation: Short-term skilled PT     PT - OK to Discharge: Yes (when medically cleared, if to STR)    See flowsheet documentation for full assessment

## 2018-08-24 NOTE — PHYSICAL THERAPY NOTE
Physical Therapy Treatment Note     08/24/18 0928   Pain Assessment   Pain Assessment No/denies pain   Pain Score No Pain   Pain Rating: FLACC (Rest) - Face 0   Pain Rating: FLACC (Rest) - Legs 0   Pain Rating: FLACC (Rest) - Activity 0   Pain Rating: FLACC (Rest) - Cry 0   Pain Rating: FLACC (Rest) - Consolability 0   Score: FLACC (Rest) 0   Pain Rating: FLACC (Activity) - Face 0   Pain Rating: FLACC (Activity) - Legs 0   Pain Rating: FLACC (Activity) - Activity 0   Pain Rating: FLACC (Activity) - Cry 0   Pain Rating: FLACC (Activity) - Consolability 0   Score: FLACC (Activity) 0   Restrictions/Precautions   Weight Bearing Precautions Per Order No   Other Precautions Cognitive; Chair Alarm; Bed Alarm;Telemetry; Fall Risk;Pain   General   Chart Reviewed Yes   Response to Previous Treatment Patient unable to report, no changes reported from family or staff   Family/Caregiver Present No   Cognition   Overall Cognitive Status Impaired  (baseline dementia)   Arousal/Participation Alert; Cooperative   Attention Attends with cues to redirect   Orientation Level Oriented to person;Oriented to place; Disoriented to time;Disoriented to situation   Memory Decreased recall of biographical information;Decreased short term memory;Decreased recall of recent events;Decreased recall of precautions   Following Commands Follows one step commands with increased time or repetition   Comments [place], pt agreeable to PT session   Subjective   Subjective "I want to sit up"   Bed Mobility   Rolling R 4  Minimal assistance   Additional items Assist x 1;Bedrails; Increased time required;Verbal cues  (log roll technique)   Supine to Sit 4  Minimal assistance   Additional items Assist x 1;Bedrails; Increased time required;Verbal cues;LE management  (log roll technique)   Transfers   Sit to Stand 4  Minimal assistance   Additional items Assist x 1; Increased time required;Verbal cues   Stand to Sit 4  Minimal assistance   Additional items Assist x 1;Increased time required;Verbal cues   Additional Comments when RW was placed in front of patient, pt asking "what is this?"  However when pt in standing position, pt able to use RW in correct manner, only requiring mod VCs for proper hand placement and safety for navigating obstacles in room w/ RW   Ambulation/Elevation   Gait pattern Decreased foot clearance;Shuffling; Short stride; Step to;Excessively slow   Gait Assistance 3  Moderate assist   Additional items Assist x 1;Verbal cues; Tactile cues   Assistive Device Rolling walker   Distance 30'   Balance   Static Sitting Fair   Dynamic Sitting Fair   Static Standing Fair -   Dynamic Standing Poor +   Ambulatory Poor +   Endurance Deficit   Endurance Deficit Yes   Activity Tolerance   Activity Tolerance Patient limited by fatigue;Treatment limited secondary to medical complications (Comment)  (baseline dementia)   Nurse Made Aware Yes, RN Stephen verbalized pt appropriate for PT session   Exercises   Heelslides Sitting;10 reps;AROM; Right;Left   Hip Abduction Sitting;10 reps;AROM; Right;Left   Hip Adduction Sitting;10 reps;AROM; Right;Left   Knee AROM [employer]; Right;Left;20 reps   Ankle Pumps Sitting;10 reps;AROM; Right;Left   Marching Sitting;AROM; Right;Left;20 reps   Assessment   Prognosis Fair   Problem List Decreased strength;Decreased endurance; Impaired balance;Decreased mobility; Decreased safety awareness;Decreased cognition;Pain   Assessment Pt seen for PT treatment session this date with interventions consisting of gait training w/ emphasis on improving pt's ability to ambulate level surfaces x 30 ft with mod A provided by therapist with RW, Therapeutic exercise consisting of: AROM 10 reps B LE in sitting position and therapeutic activity consisting of training: bed mobility, supine<>sit transfers, sit<>stand transfers and vc and tactile cues for static standing posture faciliation   Pt agreeable to PT treatment session upon arrival, pt found supine in bed w/ HOB elevated, in no apparent distress, A&O x 1 and responsive  In comparison to previous session, pt with improvements in demonstrating ability to perform level surface amb x 30', however requiring min/mod A for functional mobility tasks at this time 2/2 unsteadiness and LOB  Post session: pt returned back to recliner, chair alarm engaged, all needs in reach and RN notified of session findings/recommendations  Continue to recommend STR at time of d/c in order to maximize pt's functional independence and safety w/ mobility  Pt continues to be functioning below baseline level, and remains limited 2* factors listed above  PT will continue to see pt while here in order to address the deficits listed above and provide interventions consistent w/ POC in effort to achieve STGs  Barriers to Discharge Inaccessible home environment;Decreased caregiver support   Goals   Patient Goals "to eat breakfast"   STG Expiration Date 09/02/18   Short Term Goal #1 STGs remain appropriate   Treatment Day 2   Plan   Treatment/Interventions Functional transfer training;LE strengthening/ROM; Therapeutic exercise; Endurance training;Cognitive reorientation;Patient/family training;Equipment eval/education; Bed mobility;Gait training;Spoke to nursing;OT;Spoke to case management   Progress Slow progress, cognitive deficits   PT Frequency 5x/wk   Recommendation   Recommendation Short-term skilled PT   Equipment Recommended Walker   PT - OK to Discharge Yes  (when medically cleared, if to STR)       Basilio Stoddard, PT    Time of PT treatment session: 0833-5992

## 2018-08-24 NOTE — PROGRESS NOTES
General Cardiology   Progress Note   Rebel Hayward 80 y o  female MRN: [id number]  Unit/Bed#: -01 Encounter: [id number]      SUBJECTIVE:   Does complain of chest pain this morning  Ordered EKG and troponins  1st troponin today 0 05  OBJECTIVE:   Vitals:  Vitals:    08/24/18 0700   BP: (!) 192/81   Pulse: 60   Resp: 18   Temp: 98 6 °F (37 °C)   SpO2: 96%     Body mass index is 18 86 kg/m²  Systolic (85GJQ), SERGIO:694 , Min:118 , KTQ:591     Diastolic (84LVO), XBH:67, Min:64, Max:81      Intake/Output Summary (Last 24 hours) at 08/24/18 1130  Last data filed at 08/23/18 2001   Gross per 24 hour   Intake              840 ml   Output              750 ml   Net               90 ml     Weight (last 2 days)     Date/Time   Weight    08/22/18 1500  43 8 (96 56)    08/22/18 0955  46 1 (101 63)              PHYSICAL EXAMS:  General:  Patient is not in acute distress, laying in the bed comfortably, awake, alert responding to commands  Head: Normocephalic, Atraumatic  HEENT:  Both pupils normal-size atraumatic, normocephalic, nonicteric  Neck:  JVP not raised  Trachea central  Respiratory:  Bronchovascular breathing all over the chest without any accompaniment  Cardiovascular:  RRR  No murmurs, rubs, gallops  GI:  Abdomen soft nontender   Liver and spleen normal size  Lymphatic:  No cervical or inguinal lymphadenopathy  Neurologic:  Patient is awake alert, responding to command, well-oriented to time and place and person moving     LABORATORY RESULTS:    Results from last 7 days  Lab Units 08/24/18  1032 08/22/18  1028   TROPONIN I ng/mL 0 05* 0 04       CBC with diff:   Results from last 7 days  Lab Units 08/23/18  0441 08/22/18  1028   WBC Thousand/uL 8 76 8 24   HEMOGLOBIN g/dL 12 4 14 5   HEMATOCRIT % 39 0 45 3   MCV fL 84 85   PLATELETS Thousands/uL 169 174   MCH pg 26 7* 27 2   MCHC g/dL 31 8 32 0   RDW % 15 8* 15 6*   MPV fL 10 6 10 2   NRBC AUTO /100 WBCs 0 0       CMP:  Results from last 7 days  Lab Units 08/23/18  0441 08/22/18  1028   SODIUM mmol/L 138 137   POTASSIUM mmol/L 3 9 4 3   CHLORIDE mmol/L 103 99*   CO2 mmol/L 29 33*   ANION GAP mmol/L 6 5   BUN mg/dL 18 21   CREATININE mg/dL 0 95 1 15   GLUCOSE RANDOM mg/dL 85 106   CALCIUM mg/dL 9 0 9 7   AST U/L  --  14   ALT U/L  --  14   ALK PHOS U/L  --  103   TOTAL PROTEIN g/dL  --  7 7   BILIRUBIN TOTAL mg/dL  --  0 70   EGFR ml/min/1 73sq m 50 40       BMP:  Results from last 7 days  Lab Units 08/23/18  0441 08/22/18  1028   SODIUM mmol/L 138 137   POTASSIUM mmol/L 3 9 4 3   CHLORIDE mmol/L 103 99*   CO2 mmol/L 29 33*   BUN mg/dL 18 21   CREATININE mg/dL 0 95 1 15   GLUCOSE RANDOM mg/dL 85 106   CALCIUM mg/dL 9 0 9 7              Results from last 7 days  Lab Units 08/22/18  1028   MAGNESIUM mg/dL 1 8               Results from last 7 days  Lab Units 08/22/18  1028   INR  0 95       Lipid Profile:   No results found for: CHOL  No results found for: HDL  No results found for: LDLCALC  No results found for: TRIG    Cardiac testing:  No results found for this or any previous visit  No results found for this or any previous visit  No results found for this or any previous visit  No procedure found  No results found for this or any previous visit      Meds/Allergies   all current active meds have been reviewed and current meds:   Current Facility-Administered Medications   Medication Dose Route Frequency    amLODIPine (NORVASC) tablet 2 5 mg  2 5 mg Oral Daily    aspirin tablet 325 mg  325 mg Oral Daily    atorvastatin (LIPITOR) tablet 10 mg  10 mg Oral Daily With Dinner    dextran 70-hypromellose (GENTEAL TEARS) 0 1-0 3 % ophthalmic solution 2 drop  2 drop Both Eyes Daily    hydrALAZINE (APRESOLINE) injection 5 mg  5 mg Intravenous Q6H PRN    levothyroxine tablet 25 mcg  25 mcg Oral Daily    metoprolol tartrate (LOPRESSOR) partial tablet 12 5 mg  12 5 mg Oral Q12H [facility]    ondansetron (ZOFRAN) injection 4 mg  4 mg Intravenous Q6H PRN    pantoprazole (PROTONIX) EC tablet 40 mg  40 mg Oral Daily     Prescriptions Prior to Admission   Medication    aspirin 325 mg tablet    Cholecalciferol 2000 units TABS    cycloSPORINE (RESTASIS) 0 05 % ophthalmic emulsion    levothyroxine 25 mcg tablet    Nutritional Supplements (ENSURE ACTIVE PO)    pantoprazole (PROTONIX) 40 mg tablet    polyvinyl alcohol (LIQUIFILM TEARS) 1 4 % ophthalmic solution          ASSESSMENT & PLAN   1  Chest pain:  New onset, started earlier this morning  Patient cannot really describe the quality of chest pain  Will order an EKG and serial troponins  1st troponin today 0 05  Echo ordered, will assess EF and regional wall motion abnormalities  Continue aspirin, statin, Lopressor  Overall conservative management given advanced age and comorbidities  2   Near syncope, abnormal EKG:  -First troponin today 0 05   -EKG shows NSR, left axis deviation, RBBB, PACs, T-wave inversions in inferolateral leads  -will opt for conservative medical management given advanced age and comorbidities  -echo pending, will assess EF and regional wall motion abnormalities     3  Ascending aortic aneurysm:  -CTA chest shows ascending thoracic aortic aneurysm measuring up to 50 mm  Also notes questionable hazy next at posterior proximal margin of aneurysm, possibility of leaking of ascending thoracic aortic aneurysm cannot be confidently excluded  -control BP  Will add Lopressor  -will opt for conservative medical management  Patient appears stable on exam   -echo pending as above     4  CAD:  Conservative medical management given advanced age and comorbidities  Echo ordered as above  Continue aspirin, statin, beta-blocker      5  Hypertension:   BP labile, patient has spikes of elevated BP and then improvement  Last recorded /81  However, last 2 BP readings before then were controlled  Will continue to monitor      6  Hyperlipidemia:  Continue statin     7    History of CVA: Continue ASA, mimi Zuluaga PA-C  8/24/2018,11:30 AM    Portions of the record may have been created with voice recognition software   Occasional wrong word or "sound a like" substitutions may have occurred due to the inherent limitations of voice recognition software   Read the chart carefully and recognize, using context, where substitutions have occurred

## 2018-08-24 NOTE — PLAN OF CARE
Problem: Potential for Falls  Goal: Patient will remain free of falls  INTERVENTIONS:  - Assess patient frequently for physical needs  -  Identify cognitive and physical deficits and behaviors that affect risk of falls    -  [place] fall precautions as indicated by assessment   - Educate patient/family on patient safety including physical limitations  - Instruct patient to call for assistance with activity based on assessment  - Modify environment to reduce risk of injury  - Consider OT/PT consult to assist with strengthening/mobility   Outcome: Progressing

## 2018-08-24 NOTE — PROGRESS NOTES
[facility] Internal Medicine Progress Note  Patient: Justin Lambert 80 y o  female   MRN: [id number]  PCP: Elliot Pritchett MD  Unit/Bed#: -[facility] Encounter: [id number]  Date Of Visit: 08/24/18    Assessment:    Principal Problem:    Syncope  Active Problems:    Hypothyroidism    Cardiac pacemaker in situ    CVA (cerebral vascular accident) ([address] )    Hypertension    Dementia      Plan:      Syncope   Assessment & Plan     Near-syncope   Aspirin information gathered was most likely related to vasovagal syncope   CTA chest Ascending thoracic aortic aneurysm measuring up to 50 mm   Questionable haziness at the posterior proximal margin of the aneurysm within the mediastinum on this examination which is unfortunately significantly limited as a result of streak artifact  related to pacemaker generator and because the patient could not bring arms above head or tolerate breath-hold examination   The possibility of leaking of ascending thoracic aortic aneurysm cannot therefore unfortunately not be confidently excluded; -I spoke with Dr Riri Willoughby regarding possible leaking thoracic aortic aneurysm-he does not recommend any intervention at this time-says even with leaking would not be a surgical candidate; he recommends following her pain and she denies any chest pain presently if any change could repeat ct scan       -Blood pressure control-start norvasc  Discharge planning     S/p pacemaker-cardiology consult appreciated conservative rx       Hypertension   Assessment & Plan     Blood pressure is stable for age    Continue home medication          CVA (cerebral vascular accident) [facility])   Assessment & Plan     Previously  Continue  secondary prevention          Cardiac pacemaker in situ   Assessment & Plan     No acute events          Hypothyroidism   Assessment & Plan     Continue levothyroxine             Ss consult for discharge planning-needs str         VTE Pharmacologic Prophylaxis:   Pharmacologic: Pharmacologic VTE Prophylaxis contraindicated due to ?leaking thoracic aneurysm  Mechanical VTE Prophylaxis in Place: Yes    Patient Centered Rounds: I have performed bedside rounds with nursing staff today  Discussions with Specialists or Other Care Team Provider:     Education and Discussions with Family / Patient:     Time Spent for Care: 30 minutes  More than 50% of total time spent on counseling and coordination of care as described above  Current Length of Stay: 1 day(s)    Current Patient Status: Inpatient   Certification Statement: The patient will continue to require additional inpatient hospital stay due to possible discharge    Discharge Plan: STR    Code Status: Level 1 - Full Code      Subjective:   No c/o  Objective:     Vitals:   Temp (24hrs), Av 4 °F (36 9 °C), Min:97 8 °F (36 6 °C), Max:98 6 °F (37 °C)    HR:  [60-70] 60  Resp:  [16-18] 18  BP: (118-192)/(64-81) 192/81  SpO2:  [95 %-98 %] 96 %  Body mass index is 18 86 kg/m²  Input and Output Summary (last 24 hours): Intake/Output Summary (Last 24 hours) at 18 08  Last data filed at 18   Gross per 24 hour   Intake              960 ml   Output              950 ml   Net               10 ml       Physical Exam:     Physical Exam   Constitutional: She appears well-developed and well-nourished  HENT:   Head: Normocephalic and atraumatic  Eyes: Pupils are equal, round, and reactive to light  Cardiovascular: Normal rate and regular rhythm  Pulmonary/Chest: Effort normal and breath sounds normal    Abdominal: Soft  Bowel sounds are normal    Musculoskeletal: She exhibits no edema         Additional Data:     Labs:      Results from last 7 days  Lab Units 18  0441   WBC Thousand/uL 8 76   HEMOGLOBIN g/dL 12 4   HEMATOCRIT % 39 0   PLATELETS Thousands/uL 169   NEUTROS PCT % 63   LYMPHS PCT % 21   MONOS PCT % 15*   EOS PCT % 1       Results from last 7 days  Lab Units 18  0441 18  1028   SODIUM mmol/L 138 137 POTASSIUM mmol/L 3 9 4 3   CHLORIDE mmol/L 103 99*   CO2 mmol/L 29 33*   BUN mg/dL 18 21   CREATININE mg/dL 0 95 1 15   CALCIUM mg/dL 9 0 9 7   TOTAL PROTEIN g/dL  --  7 7   BILIRUBIN TOTAL mg/dL  --  0 70   ALK PHOS U/L  --  103   ALT U/L  --  14   AST U/L  --  14   GLUCOSE RANDOM mg/dL 85 106       Results from last 7 days  Lab Units 08/22/18  1028   INR  0 95       * I Have Reviewed All Lab Data Listed Above  * Additional Pertinent Lab Tests Reviewed: All Labs Within Last 24 Hours Reviewed    Imaging:    Imaging Reports Reviewed Today Include:   Imaging Personally Reviewed by Myself Includes:      Recent Cultures (last 7 days):           Last 24 Hours Medication List:     Current Facility-Administered Medications:  aspirin 325 mg Oral Daily Matilde Castillo MD   atorvastatin 10 mg Oral Daily With Kingsley Neely PA-C   dextran 70-hypromellose 2 drop Both Eyes Daily Matilde Castillo MD   hydrALAZINE 5 mg Intravenous Q6H PRN Giovanny Yeh MD   levothyroxine 25 mcg Oral Daily Matilde Castillo MD   metoprolol tartrate 12 5 mg Oral Q12H [facility] Rebeca Harrison PA-C   ondansetron 4 mg Intravenous Q6H PRN Matilde Castillo MD   pantoprazole 40 mg Oral Daily Matilde Castillo MD        Today, Patient Was Seen By: Giovanny Yeh MD    ** Please Note: Dragon 360 Dictation voice to text software may have been used in the creation of this document   **

## 2018-08-24 NOTE — ESCALATED TEAM TX
Care team meeting conducting with patient daughter Iván Junior regarding dcp  Daughter reports she now understands that patient will need inpatient rehab and would like referral sent to [facility] (first choice) and [place] rehab at [facility] (second choice)  MD reported patient will be treated conservatively and is pending an Echo   snf Referrals sent this day

## 2018-08-24 NOTE — SOCIAL WORK
Cm contacted patient daughter regarding repeat physical therapy recommendation  Daughter again stated she would rather have patient return to [facility]  Daughter is open to a conference call with director of nursing from [facility] regarding their ability to accommodate patient  Power contacted [facility] and left a message for [facility] requesting a return phone call

## 2018-08-25 VITALS
OXYGEN SATURATION: 94 % | WEIGHT: 96.56 LBS | DIASTOLIC BLOOD PRESSURE: 72 MMHG | RESPIRATION RATE: 16 BRPM | HEART RATE: 62 BPM | HEIGHT: 60 IN | TEMPERATURE: 97.8 F | BODY MASS INDEX: 18.96 KG/M2 | SYSTOLIC BLOOD PRESSURE: 167 MMHG

## 2018-08-25 PROCEDURE — 97116 GAIT TRAINING THERAPY: CPT

## 2018-08-25 PROCEDURE — 97110 THERAPEUTIC EXERCISES: CPT

## 2018-08-25 PROCEDURE — 99239 HOSP IP/OBS DSCHRG MGMT >30: CPT | Performed by: GENERAL PRACTICE

## 2018-08-25 PROCEDURE — 99232 SBSQ HOSP IP/OBS MODERATE 35: CPT | Performed by: INTERNAL MEDICINE

## 2018-08-25 RX ORDER — AMLODIPINE BESYLATE 5 MG/1
5 TABLET ORAL DAILY
Qty: 30 TABLET | Refills: 0 | Status: SHIPPED | OUTPATIENT
Start: 2018-08-26 | End: 2018-09-04 | Stop reason: DRUGHIGH

## 2018-08-25 RX ORDER — ATORVASTATIN CALCIUM 10 MG/1
10 TABLET, FILM COATED ORAL
Qty: 30 TABLET | Refills: 0 | Status: SHIPPED | OUTPATIENT
Start: 2018-08-25 | End: 2018-09-04 | Stop reason: DRUGHIGH

## 2018-08-25 RX ORDER — AMLODIPINE BESYLATE 5 MG/1
5 TABLET ORAL DAILY
Status: DISCONTINUED | OUTPATIENT
Start: 2018-08-25 | End: 2018-08-25 | Stop reason: HOSPADM

## 2018-08-25 RX ADMIN — ASPIRIN 325 MG: 325 TABLET ORAL at 10:59

## 2018-08-25 RX ADMIN — ATORVASTATIN CALCIUM 10 MG: 10 TABLET, FILM COATED ORAL at 16:18

## 2018-08-25 RX ADMIN — METOPROLOL TARTRATE 12.5 MG: 25 TABLET ORAL at 10:59

## 2018-08-25 RX ADMIN — DEXTRAN 70 AND HYPROMELLOSE 2910 2 DROP: 1; 3 SOLUTION/ DROPS OPHTHALMIC at 11:06

## 2018-08-25 RX ADMIN — LEVOTHYROXINE SODIUM 25 MCG: 25 TABLET ORAL at 06:03

## 2018-08-25 RX ADMIN — PANTOPRAZOLE SODIUM 40 MG: 40 TABLET, DELAYED RELEASE ORAL at 10:59

## 2018-08-25 RX ADMIN — AMLODIPINE BESYLATE 5 MG: 5 TABLET ORAL at 10:59

## 2018-08-25 NOTE — SOCIAL WORK
PT evaluated pt and they are now rec back to [facility]  Cm spoke w ELI Garner at [facility] and she is agreeable to a return today  Cm to fax d c ins when ready to [phone]  No new meds  Power spoke w Rowena Rojo and she felt considering pt dementia that she was fine to return in a wcv transport and she is agreeable to fee  Cm set up wcv via slets for 7:30 p tonight  [facility] agreeable   All parties notified

## 2018-08-25 NOTE — NURSING NOTE
Pt discharged to [facility] via wheelchair  Belongings returned to pt  IV removed from rt wrist  Left RN message about  time, after several attempts to reach via telephone  Discharge paperwork sent with pt

## 2018-08-25 NOTE — SOCIAL WORK
Pt medically cleared  Pt eval rec STR  Cm spoke w RN Rosales Hutchison at [facility] and explained that family pref for pt to return straight to [facility]  Rosales Hutchison has a call out to the  to see if pt can return---but states that they typically do not do wkend admissions  Cm spoke w pt family and they stated that they just walked w her and she was independent--which is her baseline  Cm requested PT meet w her this afternoon to confirm that recommendation will be [facility] as opposed to [facility] now   Cm awaiting call back from [facility] and PT eval recs today

## 2018-08-25 NOTE — PROGRESS NOTES
[facility] Internal Medicine Progress Note  Patient: Shanta Husbands 80 y o  female   MRN: [id number]  PCP: Jayshree Pulido MD  Unit/Bed#: [facility] Encounter: [id number]  Date Of Visit: 08/25/18    Assessment:    Active Problems:    Hypothyroidism    Cardiac pacemaker in situ    CVA (cerebral vascular accident) [facility])    Hypertension    Dementia      Plan:    Syncope   Assessment & Plan     Near-syncope   Aspirin information gathered was most likely related to vasovagal syncope   CTA chest Ascending thoracic aortic aneurysm measuring up to 50 mm   Questionable haziness at the posterior proximal margin of the aneurysm within the mediastinum on this examination which is unfortunately significantly limited as a result of streak artifact  related to pacemaker generator and because the patient could not bring arms above head or tolerate breath-hold examination   The possibility of leaking of ascending thoracic aortic aneurysm cannot therefore unfortunately not be confidently excluded; -I spoke with Dr Tip Barnes regarding possible leaking thoracic aortic aneurysm-he does not recommend any intervention at this time-says even with leaking would not be a surgical candidate; he recommends following her pain and she denies any chest pain presently if any change could repeat ct scan        -Blood pressure control-start norvasc  Discharge planning-STR     S/p pacemaker-cardiology consult appreciated conservative rx       Hypertension   Assessment & Plan     Blood pressure is stable for age    Continue home medication          CVA (cerebral vascular accident) [facility])   Assessment & Plan     Previously  Continue  secondary prevention          Cardiac pacemaker in situ   Assessment & Plan     No acute events          Hypothyroidism   Assessment & Plan     Continue levothyroxine             Ss consult for discharge planning-needs str              VTE Pharmacologic Prophylaxis:   Pharmacologic: Pharmacologic VTE Prophylaxis contraindicated due to ?leaking thoracic aortic aneurysm  Mechanical VTE Prophylaxis in Place: Yes    Patient Centered Rounds: I have performed bedside rounds with nursing staff today  Discussions with Specialists or Other Care Team Provider:     Education and Discussions with Family / Patient:     Time Spent for Care: 30 minutes  More than 50% of total time spent on counseling and coordination of care as described above  Current Length of Stay: 2 day(s)    Current Patient Status: Inpatient   Certification Statement: The patient will continue to require additional inpatient hospital stay due to awaiting STR    Discharge Plan: STR    Code Status: Level 1 - Full Code      Subjective:   C/o  Left lower lateral cp to palpation    Objective:     Vitals:   Temp (24hrs), Av °F (36 7 °C), Min:97 5 °F (36 4 °C), Max:98 4 °F (36 9 °C)    HR:  [62-65] 64  Resp:  [17-18] 18  BP: (141-174)/(62-80) 171/73  SpO2:  [92 %-96 %] 92 %  Body mass index is 18 86 kg/m²  Input and Output Summary (last 24 hours): Intake/Output Summary (Last 24 hours) at 18 9787  Last data filed at 18 0100   Gross per 24 hour   Intake              720 ml   Output              900 ml   Net             -180 ml       Physical Exam:     Physical Exam   Constitutional: She is oriented to person, place, and time  Thin wf   HENT:   Head: Normocephalic and atraumatic  Eyes: Pupils are equal, round, and reactive to light  Cardiovascular: Normal rate and regular rhythm  Pulmonary/Chest: Effort normal and breath sounds normal    Abdominal: Soft  Musculoskeletal: She exhibits no edema  Neurological: She is alert and oriented to person, place, and time         Additional Data:     Labs:      Results from last 7 days  Lab Units 18  0441   WBC Thousand/uL 8 76   HEMOGLOBIN g/dL 12 4   HEMATOCRIT % 39 0   PLATELETS Thousands/uL 169   NEUTROS PCT % 63   LYMPHS PCT % 21   MONOS PCT % 15*   EOS PCT % 1       Results from last 7 days  Lab Units 08/23/18  0441 08/22/18  1028   SODIUM mmol/L 138 137   POTASSIUM mmol/L 3 9 4 3   CHLORIDE mmol/L 103 99*   CO2 mmol/L 29 33*   BUN mg/dL 18 21   CREATININE mg/dL 0 95 1 15   CALCIUM mg/dL 9 0 9 7   TOTAL PROTEIN g/dL  --  7 7   BILIRUBIN TOTAL mg/dL  --  0 70   ALK PHOS U/L  --  103   ALT U/L  --  14   AST U/L  --  14   GLUCOSE RANDOM mg/dL 85 106       Results from last 7 days  Lab Units 08/22/18  1028   INR  0 95       * I Have Reviewed All Lab Data Listed Above  * Additional Pertinent Lab Tests Reviewed: All Labs Within Last 24 Hours Reviewed    Imaging:    Imaging Reports Reviewed Today Include:   Imaging Personally Reviewed by Myself Includes:      Recent Cultures (last 7 days):           Last 24 Hours Medication List:     Current Facility-Administered Medications:  amLODIPine 2 5 mg Oral Daily Migdalia Cordoba MD   aspirin 325 mg Oral Daily Armando Guevara MD   atorvastatin 10 mg Oral Daily With Ashley Abdi PA-C   dextran 70-hypromellose 2 drop Both Eyes Daily Armando Guevara MD   hydrALAZINE 5 mg Intravenous Q6H PRN Jhonny Cerda MD   levothyroxine 25 mcg Oral Daily Armando Guevara MD   metoprolol tartrate 12 5 mg Oral Q12H [address] Yemi Sandoval PA-C   ondansetron 4 mg Intravenous Q6H PRN Armando Guevara MD   pantoprazole 40 mg Oral Daily Armando Guevara MD        Today, Patient Was Seen By: Jhonny Cerda MD    ** Please Note: Dragon 360 Dictation voice to text software may have been used in the creation of this document   **

## 2018-08-25 NOTE — PLAN OF CARE
Problem: Potential for Falls  Goal: Patient will remain free of falls  INTERVENTIONS:  - Assess patient frequently for physical needs  -  Identify cognitive and physical deficits and behaviors that affect risk of falls    -  [place] fall precautions as indicated by assessment   - Educate patient/family on patient safety including physical limitations  - Instruct patient to call for assistance with activity based on assessment  - Modify environment to reduce risk of injury  - Consider OT/PT consult to assist with strengthening/mobility   Outcome: Progressing

## 2018-08-25 NOTE — PLAN OF CARE
Problem: PHYSICAL THERAPY ADULT  Goal: Performs mobility at highest level of function for planned discharge setting  See evaluation for individualized goals  Treatment/Interventions: Functional transfer training, LE strengthening/ROM, Therapeutic exercise, Endurance training, Cognitive reorientation, Patient/family training, Equipment eval/education, Bed mobility, Gait training, Spoke to nursing, Spoke to case management, OT  Equipment Recommended: Alex Reed       See flowsheet documentation for full assessment, interventions and recommendations  Outcome: Progressing  Prognosis: Good  Problem List: Decreased strength, Decreased endurance, Impaired balance, Decreased mobility, Decreased cognition, Impaired judgement, Decreased safety awareness, Impaired vision  Assessment: pt cont to progress c PT  performed all mobility tasks c (S)  ambulated 200' + 180' c RW c seated rest btwn trials  min verbal cues required to avoid obstacles 25% of the time 2* impaired vision  performed seated AROM ther ex B/L LE x15 reps c min verbal cues for technique  will cont skilled PT to further maximize functional mobility + improve quality of life  upon d/c, recommend pt return to F c PT at next level of care  Barriers to Discharge: None     Recommendation: Home PT (return to F c PT)     PT - OK to Discharge: Yes    See flowsheet documentation for full assessment

## 2018-08-25 NOTE — PROGRESS NOTES
General Cardiology   Progress Note   Shantal Mayes 80 y o  female MRN: [id number]  Unit/Bed#: -01 Encounter: [id number]        Subjective:   Chest pain is improved, no recurrence of presyncopal episode  Objective:   Vitals:  Vitals:    08/25/18 0700   BP: (!) 171/73   Pulse: 64   Resp: 18   Temp: 97 5 °F (36 4 °C)   SpO2: 92%       Body mass index is 18 86 kg/m²  Systolic (36YLK), VXN:965 , Min:141 , PAR:363     Diastolic (39HWB), VCQ:03, Min:63, Max:80      Intake/Output Summary (Last 24 hours) at 08/25/18 1117  Last data filed at 08/25/18 0100   Gross per 24 hour   Intake              480 ml   Output              600 ml   Net             -120 ml     Weight (last 2 days)     None            PHYSICAL EXAMS:  General:  Patient is not in acute distress, laying in the bed comfortably, awake, alert responding to commands  Head: Normocephalic, Atraumatic  HEENT: White sclera, pink conjunctiva,  PERRLA,pharynx benign  Neck:  Supple, no neck vein distention, carotids+2/+2 no bruits, thyromegaly, adenopathy  Respiratory: clear to P/A  Cardiovascular:  PMI normal, S1-S2 normal, 3 / 6 systolic murmur Murmurs, thrills, gallops, rubs   Regular rhythm  GI:  Abdomen soft nontender   No hepatosplenomegaly, adenopathy, ascites,or rebound tenderness  Extremities: No edema, normal pulses, no calf tenderness, no joint deformities, no venous disease   Integument:  No skin rashes or ulceration  Lymphatic:  No cervical or inguinal lymphadenopathy  Neurologic:  Patient is awake alert, responding to command, well-oriented to time and place and person moving all extremities      LABORATORY RESULTS:    Results from last 7 days  Lab Units 08/24/18  2101 08/24/18  1316 08/24/18  1032   TROPONIN I ng/mL 0 05* 0 05* 0 05*     CBC with diff:   Results from last 7 days  Lab Units 08/23/18  0441 08/22/18  1028   WBC Thousand/uL 8 76 8 24   HEMOGLOBIN g/dL 12 4 14 5   HEMATOCRIT % 39 0 45 3   MCV fL 84 85   PLATELETS Thousands/uL 169 174 MCH pg 26 7* 27 2   MCHC g/dL 31 8 32 0   RDW % 15 8* 15 6*   MPV fL 10 6 10 2   NRBC AUTO /100 WBCs 0 0       CMP:  Results from last 7 days  Lab Units 18  0441 18  1028   SODIUM mmol/L 138 137   POTASSIUM mmol/L 3 9 4 3   CHLORIDE mmol/L 103 99*   CO2 mmol/L 29 33*   ANION GAP mmol/L 6 5   BUN mg/dL 18 21   CREATININE mg/dL 0 95 1 15   GLUCOSE RANDOM mg/dL 85 106   CALCIUM mg/dL 9 0 9 7   AST U/L  --  14   ALT U/L  --  14   ALK PHOS U/L  --  103   TOTAL PROTEIN g/dL  --  7 7   BILIRUBIN TOTAL mg/dL  --  0 70   EGFR ml/min/1 73sq m 50 40       BMP:  Results from last 7 days  Lab Units 18  0441 18  1028   SODIUM mmol/L 138 137   POTASSIUM mmol/L 3 9 4 3   CHLORIDE mmol/L 103 99*   CO2 mmol/L 29 33*   BUN mg/dL 18 21   CREATININE mg/dL 0 95 1 15   GLUCOSE RANDOM mg/dL 85 106   CALCIUM mg/dL 9 0 9 7              Results from last 7 days  Lab Units 18  1028   MAGNESIUM mg/dL 1 8               Results from last 7 days  Lab Units 18  1028   INR  0 95       Lipid Profile:   No results found for: CHOL  No results found for: HDL  No results found for: LDLCALC  No results found for: TRIG    Cardiac testing:   Results for orders placed during the hospital encounter of 18   Echo complete with contrast if indicated    Narrative [address] [place] [id number] [phone]    Transthoracic Echocardiogram  2D, M-mode, Doppler, and Color Doppler    Study date:  24-Aug-2018    Patient: Evonne Romberg  MR number: [id number]  Account number: [de-identified]  : 02-May-1921  Age: 80 years  Gender: Female  Status: Inpatient  Location: Bedside  Height: 60 in  Weight: 96 lb  BP: 136/ 64 mmHg    Indications: Syncope and Collapse    Diagnoses: R55  - Syncope and collapse    Sonographer:  CITLALI Ye,RDCS  Interpreting Physician:  Carmen Castaneda MD  Referring Physician:  Johana Guillen PA-C  Group:  [facility]    SUMMARY    LEFT VENTRICLE:  Ejection fraction was estimated to be 55 %  There were no regional wall motion abnormalities  There was mild concentric hypertrophy  There was an increased relative contribution of atrial contraction to ventricular filling  RIGHT VENTRICLE:  Estimated peak pressure was at least 30 mmHg  Pacemaker lead noted  ATRIAL SEPTUM:  The interatrial septum appears aneurysmal     RIGHT ATRIUM:  Size was normal  Likely prominent chiari network  MITRAL VALVE:  There was mild to moderate annular calcification  There was mild regurgitation  AORTIC VALVE:  There was mild regurgitation  TRICUSPID VALVE:  There was mild to moderate regurgitation  AORTA:  Ascending aorta appears dilated  HISTORY: PRIOR HISTORY: Pacemaker, Cerebral Vascular Accident, Hypertension, Syncope, Dementia, Hyperlipidemia, Complete AV Block, Cardiac Murmur, Chest Pain, Coronary Artery Disease    PROCEDURE: The procedure was performed at the bedside  This was a routine study  The transthoracic approach was used  The study included complete 2D imaging, M-mode, complete spectral Doppler, and color Doppler  The heart rate was 68 bpm,  at the start of the study  Images were obtained from the parasternal, apical, subcostal, and suprasternal notch acoustic windows  Image quality was adequate  LEFT VENTRICLE: Size was normal  Ejection fraction was estimated to be 55 %  There were no regional wall motion abnormalities  There was mild concentric hypertrophy  DOPPLER: There was an increased relative contribution of atrial  contraction to ventricular filling  RIGHT VENTRICLE: The size was normal  Systolic function was normal  Wall thickness was normal  DOPPLER: Estimated peak pressure was at least 30 mmHg  Pacemaker lead noted  LEFT ATRIUM: Size was normal     ATRIAL SEPTUM: The interatrial septum appears aneurysmal     RIGHT ATRIUM: Size was normal  Likely prominent chiari network      MITRAL VALVE: There was mild to moderate annular calcification  Valve structure was normal  There was normal leaflet separation  DOPPLER: The transmitral velocity was within the normal range  There was no evidence for stenosis  There was  mild regurgitation  AORTIC VALVE: The valve was trileaflet  Leaflets exhibited mild calcification  DOPPLER: There was mild regurgitation  TRICUSPID VALVE: The valve structure was normal  There was normal leaflet separation  DOPPLER: The transtricuspid velocity was within the normal range  There was no evidence for stenosis  There was mild to moderate regurgitation  PULMONIC VALVE: Leaflets exhibited normal thickness, no calcification, and normal cuspal separation  DOPPLER: The transpulmonic velocity was within the normal range  There was no regurgitation  PERICARDIUM: There was no pericardial effusion  The pericardium was normal in appearance  AORTA: The root exhibited normal size  Ascending aorta appears dilated  SYSTEM MEASUREMENT TABLES    2D  %FS: 31 2 %  Ao Diam: 3 4 cm  EDV(Teich): 41 6 ml  EF(Teich): 60 3 %  ESV(Teich): 16 5 ml  IVSd: 1 cm  LA Area: 14 2 cm2  LA Diam: 2 5 cm  LVEDV MOD A4C: 58 5 ml  LVEF MOD A4C: 60 5 %  LVESV MOD A4C: 23 1 ml  LVIDd: 3 2 cm  LVIDs: 2 2 cm  LVLd A4C: 7 1 cm  LVLs A4C: 6 1 cm  LVOT Diam: 1 9 cm  LVPWd: 1 cm  RA Area: 12 4 cm2  RVIDd: 3 1 cm  SV MOD A4C: 35 4 ml  SV(Teich): 25 1 ml    CW  AR Dec Mercer: 3 4 m/s2  AR Dec Time: 1377 3 ms  AR PHT: 399 4 ms  AR Vmax: 4 6 m/s  AR maxP 2 mmHg  AV Env  Ti: 334 9 ms  AV VTI: 33 5 cm  AV Vmax: 1 5 m/s  AV Vmean: 1 m/s  AV maxP mmHg  AV meanP 8 mmHg  MR VTI: 137 3 cm  MR Vmax: 5 5 m/s  MR Vmean: 3 8 m/s  MR maxP 6 mmHg  MR meanP 5 mmHg  TR Vmax: 2 7 m/s  TR maxP 2 mmHg    MM  TAPSE: 1 5 cm    PW  DARIUS (VTI): 2 cm2  DARIUS Vmax: 2 1 cm2  E': 0 m/s  E/E': 20 3  LVOT Env  Ti: 296 9 ms  LVOT VTI: 22 7 cm  LVOT Vmax: 1 1 m/s  LVOT Vmean: 0 8 m/s  LVOT maxP 5 mmHg  LVOT meanP 6 mmHg  LVSV Dopp: 66 6 ml  MV A Daniel: 1 m/s  MV Dec [place]: 1 5 m/s2  MV DecT: 269 7 ms  MV E Daniel: 0 4 m/s  MV E/A Ratio: 0 4  MV PHT: 78 2 ms  MVA By PHT: 2 8 cm2    [facility]redited [facility]    Prepared and electronically signed by    Jeremiah Davis MD  Signed 24-Aug-2018 14:49:47       No results found for this or any previous visit  No results found for this or any previous visit  No procedure found  No results found for this or any previous visit  Meds/Allergies   all current active meds have been reviewed  Prescriptions Prior to Admission   Medication    aspirin 325 mg tablet    Cholecalciferol 2000 units TABS    cycloSPORINE (RESTASIS) 0 05 % ophthalmic emulsion    levothyroxine 25 mcg tablet    Nutritional Supplements (ENSURE ACTIVE PO)    pantoprazole (PROTONIX) 40 mg tablet    polyvinyl alcohol (LIQUIFILM TEARS) 1 4 % ophthalmic solution            Assessment/Plan:  1  Chest pain:  Probably noncardiac, troponins of flat  Continue aspirin, statin, Lopressor  Overall conservative management given advanced age and comorbidities      2   Near syncope, abnormal EKG:  -unlikely cardiac, no additional cardiac workup     3   Ascending aortic aneurysm:  -CTA chest shows ascending thoracic aortic aneurysm measuring up to 50 mm   Also notes questionable hazy next at posterior proximal margin of aneurysm, possibility of leaking of ascending thoracic aortic aneurysm cannot be confidently excluded  -control BP   Continue Lopressor  -will opt for conservative medical management   Patient appears stable on exam      4   CAD:  Conservative medical management given advanced age and comorbidities   Echo ordered as above   Continue aspirin, statin, beta-blocker      5   Hypertension:   BP labile, but reasonable      6   Hyperlipidemia:  Continue statin     7   History of CVA: Continue ASA, statin    Will see p r n      Counseling / Coordination of Care  Total floor / unit time spent today 25 minutes  Greater than 50% of total time was spent with the patient and / or family counseling and / or coordination of care  ** Please Note: Dragon 360 Dictation voice to text software may have been used in the creation of this document   **

## 2018-08-25 NOTE — PHYSICAL THERAPY NOTE
PT Progress Note (26min)  (12:22-12:48)       08/25/18 1248   Pain Assessment   Pain Assessment No/denies pain   Restrictions/Precautions   Other Precautions Cognitive; Chair Alarm; Bed Alarm; Fall Risk;Visual impairment   General   Chart Reviewed Yes   Response to Previous Treatment Patient with no complaints from previous session  Family/Caregiver Present Yes   Cognition   Orientation Level Oriented to person   Subjective   Subjective pt in bathroom upon arrival  agreeable to PT session  no new c/o's  wants to go back to [facility]   Sit to Stand 5  Supervision   Additional items Armrests; Verbal cues; Increased time required   Stand to Sit 5  Supervision   Additional items Armrests; Verbal cues; Increased time required   Ambulation/Elevation   Gait pattern Narrow ZEN; Forward Flexion;Decreased foot clearance   Gait Assistance 5  Supervision   Additional items Verbal cues  (cues to avoid obstacles 2* impaired vision)   Assistive Device Rolling walker   Distance 200' + 180' c seated rest btwn trials   Balance   Static Sitting Fair +   Dynamic Sitting Fair +   Static Standing Fair -   Dynamic Standing Fair -   Ambulatory Fair -   Activity Tolerance   Activity Tolerance Patient limited by fatigue   Nurse Made Aware Stephen   Exercises   Heelslides Sitting;15 reps;AROM; Bilateral   Hip Abduction Sitting;15 reps;AROM; Bilateral   Knee AROM Long Arc Quad Sitting;15 reps;AROM; Bilateral   Ankle Pumps Sitting;15 reps;AROM; Bilateral   Marching Sitting;15 reps;AROM; Bilateral   Assessment   Prognosis Good   Problem List Decreased strength;Decreased endurance; Impaired balance;Decreased mobility; Decreased cognition; Impaired judgement;Decreased safety awareness; Impaired vision   Assessment pt cont to progress c PT  performed all mobility tasks c (S)  ambulated 200' + 180' c RW c seated rest btwn trials  min verbal cues required to avoid obstacles 25% of the time 2* impaired vision   performed seated AROM ther ex B/L LE x15 reps c min verbal cues for technique  will cont skilled PT to further maximize functional mobility + improve quality of life  upon d/c, recommend pt return to PCF c PT at next level of care  Barriers to Discharge None   Goals   Patient Goals "to go back to [place]"  [facility] Expiration Date 09/02/18   Treatment Day 3   Plan   Treatment/Interventions Functional transfer training;LE strengthening/ROM; Therapeutic exercise; Endurance training;Patient/family training;Bed mobility;Gait training;Spoke to nursing;Spoke to case management; Family   Progress Progressing toward goals   PT Frequency 5x/wk   Recommendation   Recommendation Home PT  (return to [facility]F c PT)   PT - OK to Discharge Yes  (to [facility]F c PT )     Phoenix Martinez, PT

## 2018-08-25 NOTE — SOCIAL WORK
Pt to return w 3 new meds  Soraida Pena agreeable   AVS and new scripts faxed to [facility]

## 2018-08-26 NOTE — DISCHARGE SUMMARY
Discharge Summary - [place] [address] Internal Medicine    Patient Information: Justin Lambert 80 y o  female MRN: [id number]  Unit/Bed#: -01 Encounter: [id number]    Discharging Physician / Practitioner: Rosalind Hicks MD  PCP: Elliot Pritchett MD  Admission Date: 8/22/2018  Discharge Date: 8/25/2018    Reason for Admission: near syncope, chest pain    Discharge Diagnoses:     Principal Problem (Resolved):    Syncope  Active Problems:    Hypothyroidism    Cardiac pacemaker in situ    CVA (cerebral vascular accident) [facility])    Hypertension    Dementia      Consultations During Hospital Stay:  · Cardiology  · telecon cardiac surgery  ·   Procedures Performed:     ·   none  Significant Findings:     · See below    Incidental Findings:   ·      Test Results Pending at Discharge (will require follow up):   ·      Outpatient Tests Requested:  ·     Complications:      Hospital Course: Justin Lambert is a 80 y o  female patient who originally presented to the hospital on 8/22/2018 due to chest pain      Syncope   Assessment & Plan     Near-syncope   Aspirin information gathered was most likely related to vasovagal syncope    CTA chest Ascending thoracic aortic aneurysm measuring up to 50 mm   Questionable haziness at the posterior proximal margin of the aneurysm within the mediastinum on this examination which is unfortunately significantly limited as a result of streak artifact  related to pacemaker generator and because the patient could not bring arms above head or tolerate breath-hold examination   The possibility of leaking of ascending thoracic aortic aneurysm cannot therefore unfortunately not be confidently excluded; -I spoke with Dr Riri Willoughby regarding possible leaking thoracic aortic aneurysm-he does not recommend any intervention at this time-says even with leaking would not be a surgical candidate; he recommends following her pain and she denies any chest pain presently if any change could repeat ct scan        -Blood pressure control-on norvasc  Discharge planning-STR     S/p pacemaker-cardiology consult appreciated conservative rx       Hypertension   Assessment & Plan     Blood pressure is stable for age  Continue home medication          CVA (cerebral vascular accident) [facility])   Assessment & Plan     Previously  Continue  secondary prevention          Cardiac pacemaker in situ   Assessment & Plan     No acute events          Hypothyroidism   Assessment & Plan     Continue levothyroxine             Return to [facility] personal care home                Condition at Discharge: stable     Discharge Day Visit / Exam:     * Please refer to separate progress for these details *    Discharge instructions/Information to patient and family:   See after visit summary for information provided to patient and family  Provisions for Follow-Up Care:  See after visit summary for information related to follow-up care and any pertinent home health orders  Disposition:     Home    For Discharges to [facility] SNF:   · Not Applicable to this Patient - Not Applicable to this Patient    Planned Readmission:     Discharge Statement:  I spent 40 minutes discharging the patient  This time was spent on the day of discharge  I had direct contact with the patient on the day of discharge  Greater than 50% of the total time was spent examining patient, answering all patient questions, arranging and discussing plan of care with patient as well as directly providing post-discharge instructions  Additional time then spent on discharge activities  Discharge Medications:  See after visit summary for reconciled discharge medications provided to patient and family  ** Please Note: Dragon 360 Dictation voice to text software may have been used in the creation of this document   **

## 2018-09-04 ENCOUNTER — OFFICE VISIT (OUTPATIENT)
Dept: CARDIOLOGY CLINIC | Facility: CLINIC | Age: 83
End: 2018-09-04
Payer: MEDICARE

## 2018-09-04 VITALS
SYSTOLIC BLOOD PRESSURE: 118 MMHG | WEIGHT: 99 LBS | HEIGHT: 60 IN | DIASTOLIC BLOOD PRESSURE: 58 MMHG | BODY MASS INDEX: 19.44 KG/M2 | HEART RATE: 64 BPM

## 2018-09-04 DIAGNOSIS — T82.118A PACEMAKER FAILURE, INITIAL ENCOUNTER: Primary | ICD-10-CM

## 2018-09-04 PROCEDURE — 99214 OFFICE O/P EST MOD 30 MIN: CPT | Performed by: INTERNAL MEDICINE

## 2018-09-04 NOTE — PROGRESS NOTES
Patient ID: Dane Song is a 80 y o  female  This note will serve as an H and P when she returns for device change out  Plan:      Pacemaker failure  We discussed pros and cons of pacer change with patient and daughter  All agree to proceed knowing risks  Hypertension  Too well controlled  OK to d/c amlodipine  Dyslipidemia  Given age and weight loss, ok to d/c atorvastatin  Complete atrioventricular block (HCC)  Currently with intact AV conduction  Follow up Plan:Device f/u after pacer change out  HPI:   Patient is seen today as her device has reached end of life parameters  She had a pacemaker placed in 2009  This is a dual-chamber pacer which was placed because of at least transient complete heart block  She also has a history of distant coronary stent  She currently resides in a personal care home  She has had some loss of balance of late  She is still able to hear some and knows her name and in general understands what's going on  She requires a walker for any ambulation  No recent chest pain or chest pressure  There has been some weight loss over the past few years  No results found for this visit on 09/04/18  Past Surgical History:   Procedure Laterality Date   Loly Cobos CARDIAC PACEMAKER PLACEMENT  12/09/2009    [employer] [address] Model [id number] Serial # [id number]    CORONARY ANGIOPLASTY WITH STENT PLACEMENT      unsure of date     CMP:   Lab Results   Component Value Date     08/23/2018    K 3 9 08/23/2018     08/23/2018    CO2 29 08/23/2018    BUN 18 08/23/2018    CREATININE 0 95 08/23/2018    EGFR 50 08/23/2018       Lipid Profile: No results found for: CHOL, TRIG, HDL, LDL      Review of Systems   10  point ROS  was otherwise non pertinent or negative except as per HPI or as below  Gait:   Uses a walker  Hard of hearing  Appetite is fair          Objective:     /58   Pulse 64   Ht 5' (1 524 m)   Wt 44 9 kg (99 lb)   BMI 19 33 kg/m² PHYSICAL EXAM:    General:  Normal appearance in no distress  Eyes:  Anicteric  Oral mucosa:  Moist   Neck:  No JVD  Carotid upstrokes are brisk without bruits  No masses  Chest:  Clear to auscultation and percussion  Cardiac:  Normal PMI  Normal S1 and S2  No murmur gallop or rub  Abdomen:  Soft and nontender  No palpable organomegaly or aortic enlargement  Extremities:  No peripheral edema  Musculoskeletal:  Symmetric  Vascular:  Femoral pulses are brisk without bruits  Popliteal pulses are intact bilaterally  Pedal pulses are intact  Neuro:  Grossly symmetric  Psych:  Alert and oriented x3  However, she has a little bit of difficulty coming up with the proper answers  Current Outpatient Prescriptions:     aspirin 325 mg tablet, Take 325 mg by mouth daily, Disp: , Rfl:     Cholecalciferol 2000 units TABS, Take 1 tablet by mouth daily, Disp: , Rfl:     cycloSPORINE (RESTASIS) 0 05 % ophthalmic emulsion, Administer 1 drop to both eyes 2 (two) times a day, Disp: , Rfl:     levothyroxine 25 mcg tablet, Take 25 mcg by mouth daily, Disp: , Rfl:     metoprolol tartrate (LOPRESSOR) 25 mg tablet, Take 0 5 tablets (12 5 mg total) by mouth every 12 (twelve) hours, Disp: 60 tablet, Rfl: 0    Nutritional Supplements (ENSURE ACTIVE PO), Take by mouth daily, Disp: , Rfl:     pantoprazole (PROTONIX) 40 mg tablet, Take 40 mg by mouth daily, Disp: , Rfl:     polyvinyl alcohol (LIQUIFILM TEARS) 1 4 % ophthalmic solution, Administer 2 drops to both eyes daily, Disp: , Rfl:   No Known Allergies  Past Medical History:   Diagnosis Date    Atrioventricular block, complete (HCC)     CAD (coronary artery disease)     remote stenting    Cardiac pacemaker in situ 12/09/2009    Cerebral infarct ([facility] [address] )     due to unspecified occlusion and stenosis of unspecified cerebral artery    Essential (primary) hypertension     History of echocardiogram 04/27/2015    EF 55%, mild MR, mild TR      Mixed hyperlipidemia     Subarachnoid hemorrhage (HCC)            History   Smoking Status    Former Smoker   Smokeless Tobacco    Never Used

## 2018-09-04 NOTE — ASSESSMENT & PLAN NOTE
We discussed pros and cons of pacer change with patient and daughter  All agree to proceed knowing risks

## 2018-09-28 ENCOUNTER — HOSPITAL ENCOUNTER (EMERGENCY)
Facility: HOSPITAL | Age: 83
End: 2018-09-28
Attending: EMERGENCY MEDICINE
Payer: MEDICARE

## 2018-09-28 ENCOUNTER — APPOINTMENT (EMERGENCY)
Dept: CT IMAGING | Facility: HOSPITAL | Age: 83
End: 2018-09-28
Payer: MEDICARE

## 2018-09-28 ENCOUNTER — APPOINTMENT (EMERGENCY)
Dept: RADIOLOGY | Facility: HOSPITAL | Age: 83
End: 2018-09-28
Payer: MEDICARE

## 2018-09-28 ENCOUNTER — HOSPITAL ENCOUNTER (INPATIENT)
Facility: HOSPITAL | Age: 83
LOS: 4 days | Discharge: NURSING, REHAB OR LONG-TERM CARE | DRG: 536 | End: 2018-10-02
Attending: INTERNAL MEDICINE | Admitting: INTERNAL MEDICINE
Payer: MEDICARE

## 2018-09-28 VITALS
DIASTOLIC BLOOD PRESSURE: 42 MMHG | OXYGEN SATURATION: 90 % | RESPIRATION RATE: 16 BRPM | SYSTOLIC BLOOD PRESSURE: 111 MMHG | BODY MASS INDEX: 18.89 KG/M2 | HEART RATE: 68 BPM | TEMPERATURE: 97.4 F | WEIGHT: 100 LBS
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
## 2018-09-28 DIAGNOSIS — S32.301A CLOSED FRACTURE OF RIGHT ILIAC WING, INITIAL ENCOUNTER (HCC): Primary | Chronic | ICD-10-CM

## 2018-09-28 DIAGNOSIS — R26.9 GAIT ABNORMALITY: ICD-10-CM
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
## 2018-09-28 PROBLEM — E78.2 MIXED HYPERLIPIDEMIA: Chronic | Status: ACTIVE | Noted: 2018-08-15

## 2018-09-28 PROBLEM — D72.823 LEUKEMOID REACTION: Chronic | Status: ACTIVE | Noted: 2018-09-28
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
## 2018-09-28 PROBLEM — I10 HYPERTENSION: Chronic | Status: ACTIVE | Noted: 2018-08-15

## 2018-09-28 PROBLEM — K21.9 GASTROESOPHAGEAL REFLUX DISEASE WITHOUT ESOPHAGITIS: Chronic | Status: ACTIVE | Noted: 2018-09-28

## 2018-09-28 PROBLEM — F03.90 DEMENTIA (HCC): Chronic | Status: ACTIVE | Noted: 2018-08-22

## 2018-09-28 PROBLEM — D72.823 LEUKEMOID REACTION: Status: ACTIVE | Noted: 2018-09-28

## 2018-09-28 PROBLEM — E03.9 HYPOTHYROIDISM: Chronic | Status: ACTIVE | Noted: 2018-08-15

## 2018-09-28 PROBLEM — Z95.0 CARDIAC PACEMAKER IN SITU: Chronic | Status: ACTIVE | Noted: 2018-08-15

## 2018-09-28 LAB
ALBUMIN SERPL BCP-MCNC: 3.6 G/DL (ref 3.5–5.7)
ALP SERPL-CCNC: 110 U/L (ref 55–165)
ALT SERPL W P-5'-P-CCNC: 12 U/L (ref 7–52)
ANION GAP SERPL CALCULATED.3IONS-SCNC: 10 MMOL/L (ref 4–13)
AST SERPL W P-5'-P-CCNC: 20 U/L (ref 13–39)
ATRIAL RATE: 60 BPM
BASOPHILS # BLD AUTO: 0 THOUSANDS/ΜL (ref 0–0.1)
BASOPHILS NFR BLD AUTO: 0 % (ref 0–1)
BILIRUB SERPL-MCNC: 1.2 MG/DL (ref 0.2–1)
BILIRUB UR QL STRIP: NEGATIVE
BUN SERPL-MCNC: 16 MG/DL (ref 7–25)
CALCIUM SERPL-MCNC: 9.7 MG/DL (ref 8.6–10.5)
CHLORIDE SERPL-SCNC: 97 MMOL/L (ref 98–107)
CLARITY UR: CLEAR
CO2 SERPL-SCNC: 31 MMOL/L (ref 21–31)
COLOR UR: YELLOW
CREAT SERPL-MCNC: 0.97 MG/DL (ref 0.6–1.2)
EOSINOPHIL # BLD AUTO: 0 THOUSAND/ΜL (ref 0–0.61)
EOSINOPHIL NFR BLD AUTO: 0 % (ref 0–6)
ERYTHROCYTE [DISTWIDTH] IN BLOOD BY AUTOMATED COUNT: 15.7 % (ref 11.6–15.1)
GFR SERPL CREATININE-BSD FRML MDRD: 49 ML/MIN/1.73SQ M
GLUCOSE SERPL-MCNC: 104 MG/DL (ref 65–140)
GLUCOSE UR STRIP-MCNC: NEGATIVE MG/DL
HCT VFR BLD AUTO: 41.4 % (ref 37–47)
HGB BLD-MCNC: 13.1 G/DL (ref 11.5–15.4)
HGB UR QL STRIP.AUTO: NEGATIVE
KETONES UR STRIP-MCNC: NEGATIVE MG/DL
LEUKOCYTE ESTERASE UR QL STRIP: NEGATIVE
LYMPHOCYTES # BLD AUTO: 0.8 THOUSANDS/ΜL (ref 0.6–4.47)
LYMPHOCYTES NFR BLD AUTO: 7 % (ref 14–44)
MCH RBC QN AUTO: 26.7 PG (ref 26.8–34.3)
MCHC RBC AUTO-ENTMCNC: 31.7 G/DL (ref 31.4–37.4)
MCV RBC AUTO: 84 FL (ref 82–98)
MONOCYTES # BLD AUTO: 1 THOUSAND/ΜL (ref 0.17–1.22)
MONOCYTES NFR BLD AUTO: 9 % (ref 4–12)
NEUTROPHILS # BLD AUTO: 9.3 THOUSANDS/ΜL (ref 1.85–7.62)
NEUTS SEG NFR BLD AUTO: 84 % (ref 43–75)
NITRITE UR QL STRIP: NEGATIVE
NRBC BLD AUTO-RTO: 0 /100 WBCS
P AXIS: 89 DEGREES
PH UR STRIP.AUTO: 7 [PH] (ref 5–8)
PLATELET # BLD AUTO: 152 THOUSANDS/UL (ref 149–390)
PLATELET # BLD AUTO: 153 THOUSANDS/UL (ref 149–390)
PMV BLD AUTO: 8.1 FL (ref 8.9–12.7)
POTASSIUM SERPL-SCNC: 4.4 MMOL/L (ref 3.5–5.5)
PR INTERVAL: 244 MS
PROT SERPL-MCNC: 6.5 G/DL (ref 6.4–8.9)
PROT UR STRIP-MCNC: NEGATIVE MG/DL
QRS AXIS: 100 DEGREES
QRSD INTERVAL: 114 MS
QT INTERVAL: 450 MS
QTC INTERVAL: 450 MS
RBC # BLD AUTO: 4.92 MILLION/UL (ref 3.81–5.12)
SODIUM SERPL-SCNC: 138 MMOL/L (ref 134–143)
SP GR UR STRIP.AUTO: 1.01 (ref 1–1.03)
T WAVE AXIS: 132 DEGREES
UROBILINOGEN UR QL STRIP.AUTO: 1 E.U./DL
VENTRICULAR RATE: 60 BPM
WBC # BLD AUTO: 11.2 THOUSAND/UL (ref 4.31–10.16)

## 2018-09-28 PROCEDURE — 73070 X-RAY EXAM OF ELBOW: CPT

## 2018-09-28 PROCEDURE — 80053 COMPREHEN METABOLIC PANEL: CPT | Performed by: EMERGENCY MEDICINE

## 2018-09-28 PROCEDURE — 73502 X-RAY EXAM HIP UNI 2-3 VIEWS: CPT

## 2018-09-28 PROCEDURE — 73700 CT LOWER EXTREMITY W/O DYE: CPT

## 2018-09-28 PROCEDURE — 99223 1ST HOSP IP/OBS HIGH 75: CPT | Performed by: NURSE PRACTITIONER

## 2018-09-28 PROCEDURE — 85025 COMPLETE CBC W/AUTO DIFF WBC: CPT | Performed by: EMERGENCY MEDICINE

## 2018-09-28 PROCEDURE — 70450 CT HEAD/BRAIN W/O DYE: CPT

## 2018-09-28 PROCEDURE — 36415 COLL VENOUS BLD VENIPUNCTURE: CPT | Performed by: EMERGENCY MEDICINE

## 2018-09-28 PROCEDURE — 85049 AUTOMATED PLATELET COUNT: CPT | Performed by: NURSE PRACTITIONER

## 2018-09-28 PROCEDURE — 71046 X-RAY EXAM CHEST 2 VIEWS: CPT

## 2018-09-28 PROCEDURE — 93005 ELECTROCARDIOGRAM TRACING: CPT

## 2018-09-28 PROCEDURE — 99285 EMERGENCY DEPT VISIT HI MDM: CPT

## 2018-09-28 PROCEDURE — 81003 URINALYSIS AUTO W/O SCOPE: CPT | Performed by: EMERGENCY MEDICINE

## 2018-09-28 RX ORDER — SODIUM CHLORIDE 9 MG/ML
75 INJECTION, SOLUTION INTRAVENOUS CONTINUOUS
Status: DISCONTINUED | OUTPATIENT
Start: 2018-09-28 | End: 2018-09-29

## 2018-09-28 RX ORDER — ACETAMINOPHEN 325 MG/1
650 TABLET ORAL EVERY 6 HOURS PRN
Status: DISCONTINUED | OUTPATIENT
Start: 2018-09-28 | End: 2018-10-02 | Stop reason: HOSPADM

## 2018-09-28 RX ORDER — HEPARIN SODIUM 5000 [USP'U]/ML
5000 INJECTION, SOLUTION INTRAVENOUS; SUBCUTANEOUS EVERY 8 HOURS SCHEDULED
Status: DISCONTINUED | OUTPATIENT
Start: 2018-09-28 | End: 2018-10-02 | Stop reason: HOSPADM

## 2018-09-28 RX ORDER — PANTOPRAZOLE SODIUM 40 MG/1
40 TABLET, DELAYED RELEASE ORAL DAILY
Status: DISCONTINUED | OUTPATIENT
Start: 2018-09-28 | End: 2018-10-02 | Stop reason: HOSPADM

## 2018-09-28 RX ORDER — MELATONIN
2000 DAILY
Status: DISCONTINUED | OUTPATIENT
Start: 2018-09-28 | End: 2018-10-02 | Stop reason: HOSPADM

## 2018-09-28 RX ORDER — ONDANSETRON 2 MG/ML
4 INJECTION INTRAMUSCULAR; INTRAVENOUS EVERY 6 HOURS PRN
Status: DISCONTINUED | OUTPATIENT
Start: 2018-09-28 | End: 2018-10-02 | Stop reason: HOSPADM

## 2018-09-28 RX ORDER — POLYVINYL ALCOHOL 14 MG/ML
2 SOLUTION/ DROPS OPHTHALMIC DAILY
Status: DISCONTINUED | OUTPATIENT
Start: 2018-09-28 | End: 2018-09-28

## 2018-09-28 RX ORDER — ASPIRIN 325 MG
325 TABLET ORAL DAILY
Status: DISCONTINUED | OUTPATIENT
Start: 2018-09-28 | End: 2018-10-02 | Stop reason: HOSPADM

## 2018-09-28 RX ORDER — LEVOTHYROXINE SODIUM 0.03 MG/1
25 TABLET ORAL
Status: DISCONTINUED | OUTPATIENT
Start: 2018-09-29 | End: 2018-10-02 | Stop reason: HOSPADM

## 2018-09-28 RX ADMIN — PANTOPRAZOLE SODIUM 40 MG: 40 TABLET, DELAYED RELEASE ORAL at 16:22

## 2018-09-28 RX ADMIN — METOPROLOL TARTRATE 12.5 MG: 25 TABLET ORAL at 22:56

## 2018-09-28 RX ADMIN — DEXTRAN 70 AND HYPROMELLOSE 2910 1 DROP: 1; 3 SOLUTION/ DROPS OPHTHALMIC at 22:45

## 2018-09-28 RX ADMIN — SODIUM CHLORIDE 75 ML/HR: 9 INJECTION, SOLUTION INTRAVENOUS at 16:21

## 2018-09-28 RX ADMIN — HEPARIN SODIUM 5000 UNITS: 5000 INJECTION INTRAVENOUS; SUBCUTANEOUS at 22:45

## 2018-09-28 RX ADMIN — ASPIRIN 325 MG: 325 TABLET ORAL at 16:22

## 2018-09-28 RX ADMIN — VITAMIN D, TAB 1000IU (100/BT) 2000 UNITS: 25 TAB at 16:22

## 2018-09-28 RX ADMIN — HEPARIN SODIUM 5000 UNITS: 5000 INJECTION INTRAVENOUS; SUBCUTANEOUS at 16:22

## 2018-09-28 NOTE — H&P
H&P- Nanda Szymanski 5/2/1921, 80 y o  female MRN: [id number]    Unit/Bed#: -02 Encounter: [id number]    Primary Care Provider: Pedro Hurtado MD   Date and time admitted to hospital: 9/28/2018 12:56 PM        * Closed fracture of right iliac wing ([address] )   Assessment & Plan    · Patient had fallen out of her bed last evening and landed on her right hip  · [facility]  · X-rays performed showed no fracture, however CT of the right hip did show a fracture in the right iliac wing  · Orthopedics have been consulted  · PT OT  · Pain control     Fall from bed   Assessment & Plan    · Patient does have a history of dementia however patient states that she was getting up to go to the bathroom when she slipped off of the bed and fell onto the floor at home  · Fall precautions     Leukemoid reaction   Assessment & Plan    · Secondary to fall with fractured right iliac wing     Dementia   Assessment & Plan    · Continue patient's home medications  · She is stable     Cardiac pacemaker in situ   Assessment & Plan    · This is secondary to complete heart block  And history of coronary artery disease with stenting  · Continue aspirin     Gastroesophageal reflux disease without esophagitis   Assessment & Plan    · Continue Protonix     Mixed hyperlipidemia   Assessment & Plan    · Stable     Hypothyroidism   Assessment & Plan    · Continue Synthroid     Hypertension   Assessment & Plan    · Continue metoprolol         VTE Prophylaxis: Heparin  Code Status: full  POLST: POLST is not applicable to this patient  Discussion with family:  Consult to Orthopedics and IV fluids    Anticipated Length of Stay:  Patient will be admitted on an Inpatient basis with an anticipated length of stay of  > 2 midnights  Justification for Hospital Stay:   Right fractured iliac wing    Total Time for Visit, including Counseling / Coordination of Care: 30 minutes    Greater than 50% of this total time spent on direct patient counseling and coordination of care  Chief Complaint:     Fall from bed    History of Present Illness:    Faustino Rendon is a 80 y o  female who presents with fall from bed at gets his personal care home  Patient landed on her right hip, and was unable to bear weight or walk  Patient does have a history of dementia, however she did state that she was getting up out of bed to go to the bathroom  Family at the bedside does state that she has a roller walker for ambulation  Patient does have a right hematoma on her right elbow, there is no ecchymotic areas on that right hip we will monitor for any new findings  The patient states that nothing hurts  Review of Systems:  Review of Systems   Constitutional: Negative  HENT: Negative  Eyes: Negative  Respiratory: Negative  Cardiovascular: Negative  Gastrointestinal: Negative  Endocrine: Negative  Genitourinary: Negative  Musculoskeletal: Negative  Skin: Negative  Allergic/Immunologic: Negative  Neurological: Negative  Hematological: Negative  Psychiatric/Behavioral: Negative  Past Medical and Surgical History:   Past Medical History:   Diagnosis Date    Aphasia as late effect of cerebrovascular accident     Arthritis     Atrioventricular block, complete ([address] )     CAD (coronary artery disease)     remote stenting    Cardiac pacemaker in situ 12/09/2009    Cerebral infarct ([address] )     due to unspecified occlusion and stenosis of unspecified cerebral artery    Disease of thyroid gland     Dysphagia     Essential (primary) hypertension     GERD (gastroesophageal reflux disease)     PUD    Gout     History of echocardiogram 04/27/2015    EF 55%, mild MR, mild TR      Mixed hyperlipidemia     Seizures (HCC)     Shoulder fracture, left     Stroke (HCC)     dysphagia, aphasia    Subarachnoid hemorrhage (HCC)     Wrist fracture, closed, left, with routine healing, subsequent encounter 2003       Past Surgical History:   Procedure Laterality Date   [facility] PLACEMENT  12/09/2009    [address] Model [id number] Serial # [id number]    CORONARY ANGIOPLASTY WITH STENT PLACEMENT      unsure of date    EYE SURGERY      FRACTURE SURGERY      SKIN BIOPSY         Meds/Allergies:  Prior to Admission medications    Medication Sig Start Date End Date Taking? Authorizing Provider   aspirin 325 mg tablet Take 325 mg by mouth daily   Yes Historical Provider, MD   Cholecalciferol 2000 units TABS Take 1 tablet by mouth daily   Yes Historical Provider, MD   cycloSPORINE (RESTASIS) 0 05 % ophthalmic emulsion Administer 1 drop to both eyes 2 (two) times a day   Yes Historical Provider, MD   levothyroxine 25 mcg tablet Take 25 mcg by mouth daily   Yes Historical Provider, MD   metoprolol tartrate (LOPRESSOR) 25 mg tablet Take 0 5 tablets (12 5 mg total) by mouth every 12 (twelve) hours 8/25/18  Yes Linsey Cordoba MD   Nutritional Supplements (ENSURE ACTIVE PO) Take by mouth daily   Yes Historical Provider, MD   pantoprazole (PROTONIX) 40 mg tablet Take 40 mg by mouth daily   Yes Historical Provider, MD   polyvinyl alcohol (LIQUIFILM TEARS) 1 4 % ophthalmic solution Administer 2 drops to both eyes daily   Yes Historical Provider, MD     I have reviewed home medications with patient family member  Allergies: No Known Allergies    Social History:  Marital Status:     Occupation:   Retired  Patient Pre-hospital Living Situation:   Gets is personal care  Patient Pre-hospital Level of Mobility:   Roller walker  Patient Pre-hospital Diet Restrictions:   None  Substance Use History:     History   Alcohol Use No     History   Smoking Status    Former Smoker    Types: Cigarettes   Smokeless Tobacco    Never Used     History   Drug Use No       Family History:  I have reviewed the patients family history    Physical Exam:   Vitals:   Height: 5' (152 4 cm) (09/28/18 1300)  Weight - Scale: 45 4 kg (100 lb) (09/28/18 1300)    Physical Exam   Constitutional: She is oriented to person, place, and time  Vital signs are normal  She appears well-developed  She is cooperative  HENT:   Head: Normocephalic and atraumatic  Nose: Nose normal    Mouth/Throat: Mucous membranes are normal    Eyes: Pupils are equal, round, and reactive to light  Neck: Neck supple  Cardiovascular: Normal rate, regular rhythm, normal heart sounds and normal pulses  Pacemaker right anterior chest wall   Pulmonary/Chest: Effort normal and breath sounds normal    Abdominal: Soft  Normal appearance and bowel sounds are normal    Musculoskeletal:        Right hip: She exhibits decreased range of motion and tenderness  Neurological: She is alert and oriented to person, place, and time  Skin:        Psychiatric: She has a normal mood and affect  Her speech is normal and behavior is normal        Additional Data:   Lab Results: I have personally reviewed pertinent reports  Results from last 7 days  Lab Units 09/28/18  0922   WBC Thousand/uL 11 20*   HEMOGLOBIN g/dL 13 1   HEMATOCRIT % 41 4   PLATELETS Thousands/uL 153   NEUTROS PCT % 84*   LYMPHS PCT % 7*   MONOS PCT % 9   EOS PCT % 0       Results from last 7 days  Lab Units 09/28/18  0922   SODIUM mmol/L 138   POTASSIUM mmol/L 4 4   CHLORIDE mmol/L 97*   CO2 mmol/L 31   BUN mg/dL 16   CREATININE mg/dL 0 97   CALCIUM mg/dL 9 7   ALK PHOS U/L 110   ALT U/L 12   AST U/L 20                   Imaging: I have personally reviewed pertinent reports  No orders to display       EKG, Pathology, and Other Studies Reviewed on Admission:   · EKG:   Atrial paced    NetAccess/Epic Records Reviewed: Yes     ** Please Note: This note has been constructed using a voice recognition system   **

## 2018-09-28 NOTE — ASSESSMENT & PLAN NOTE
· Patient had fallen out of her bed last evening and landed on her right hip  · She is a resident at [facility]  · X-rays performed showed no fracture, however CT of the right hip did show a fracture in the right iliac wing    · Orthopedics have been consulted  · PT OT  · Pain control

## 2018-09-28 NOTE — ED TRIAGE NOTES
Patient arrives from [facility] after a fall that occurred last night  Patient arrived this morning due to now being unable to bbare weight to right hip  Hematoma noted to right elbow, denies hitting head  Patient describes fall as falling out of bed in attempts to go to the bathroom, landing on right side

## 2018-09-28 NOTE — ASSESSMENT & PLAN NOTE
· Patient does have a history of dementia however patient states that she was getting up to go to the bathroom when she slipped off of the bed and fell onto the floor at home    · Fall precautions

## 2018-09-28 NOTE — ED PROVIDER NOTES
History  Chief Complaint   Patient presents with    Fall     Patient is a 51-year-old female nursing home resident who is brought in by EMS because she fell    his extensive past medical history which includes:  Past Medical History:   Aphasia as late effect of cerebrovascular accident    Atrioventricular block, complete ([address] )    CAD (coronary artery disease)    remote stenting   Cardiac pacemaker in situ 12/09/2009   Cerebral [facility] [address] )    due to unspecified occlusion and stenosis of unspecified cerebral artery   Disease of thyroid gland    Dysphagia    Essential (primary) hypertension    GERD (gastroesophageal reflux disease)    PUD   Gout    History of echocardiogram 04/27/2015   EF 55%, mild MR, mild TR   Mixed hyperlipidemia    Seizures (HCC)    Shoulder fracture, left    Stroke (HCC)    dysphagia, aphasia   Subarachnoid hemorrhage (HCC)    Wrist fracture, closed, left, with routine healing, subsequent encounter 2003    She says that when she fell and she injured her right hip  She has been able to walk that she says is somewhat painful  She says she did not strike her head and she is here for further evaluation            Prior to Admission Medications   Prescriptions Last Dose Informant Patient Reported? Taking?    Cholecalciferol 2000 units TABS   Yes No   Sig: Take 1 tablet by mouth daily   Nutritional Supplements (ENSURE ACTIVE PO)   Yes No   Sig: Take by mouth daily   aspirin 325 mg tablet   Yes No   Sig: Take 325 mg by mouth daily   cycloSPORINE (RESTASIS) 0 05 % ophthalmic emulsion   Yes No   Sig: Administer 1 drop to both eyes 2 (two) times a day   levothyroxine 25 mcg tablet   Yes No   Sig: Take 25 mcg by mouth daily   metoprolol tartrate (LOPRESSOR) 25 mg tablet   No No   Sig: Take 0 5 tablets (12 5 mg total) by mouth every 12 (twelve) hours   pantoprazole (PROTONIX) 40 mg tablet   Yes No   Sig: Take 40 mg by mouth daily   polyvinyl alcohol (LIQUIFILM TEARS) 1 4 % ophthalmic solution   Yes No   Sig: Administer 2 drops to both eyes daily      Facility-Administered Medications: None       Past Medical History:   Diagnosis Date    Aphasia as late effect of cerebrovascular accident     [address] [address] [address]     CAD (coronary artery disease)     remote stenting    Cardiac pacemaker in situ 12/09/2009    Cerebral infarct ([facility] )     due to unspecified occlusion and stenosis of unspecified cerebral artery    Disease of thyroid gland     Dysphagia     Essential (primary) hypertension     GERD (gastroesophageal reflux disease)     PUD    Gout     History of echocardiogram 04/27/2015    EF 55%, mild MR, mild TR   Mixed hyperlipidemia     Seizures (HCC)     Shoulder fracture, left     Stroke (HCC)     dysphagia, aphasia    Subarachnoid hemorrhage (HCC)     Wrist fracture, closed, left, with routine healing, subsequent encounter 2003       Past Surgical History:   Procedure Laterality Date   [employer] [facility]ARDIAC PACEMAKER PLACEMENT  12/09/2009    [employer] Model [id number] Serial # [id number]    CORONARY ANGIOPLASTY WITH STENT PLACEMENT      unsure of date       History reviewed  No pertinent family history  I have reviewed and agree with the history as documented  Social History   Substance Use Topics    Smoking status: Former Smoker     Types: Cigarettes    Smokeless tobacco: Never Used    Alcohol use No        Review of Systems   Constitutional: Negative for chills, fatigue, fever and unexpected weight change  HENT: Negative for congestion and nosebleeds  Eyes: Negative for visual disturbance  Respiratory: Negative for chest tightness and shortness of breath  Cardiovascular: Negative for chest pain, palpitations and leg swelling  Gastrointestinal: Negative for abdominal pain, blood in stool, diarrhea, nausea and vomiting  Endocrine: Negative for cold intolerance and heat intolerance  Genitourinary: Negative for difficulty urinating  Musculoskeletal: Negative for arthralgias, back pain, gait problem, joint swelling and myalgias  Skin: Negative for rash  Neurological: Negative for dizziness, speech difficulty, weakness and headaches  Psychiatric/Behavioral: Negative for behavioral problems, confusion, self-injury and suicidal ideas  All other systems reviewed and are negative  Physical Exam  Physical Exam   Constitutional: She is oriented to person, place, and time  She appears well-developed and well-nourished  HENT:   Head: Normocephalic and atraumatic  Nose: Nose normal    Eyes: Pupils are equal, round, and reactive to light  EOM are normal    Neck: Normal range of motion  Neck supple  Cardiovascular: Normal rate, regular rhythm and normal heart sounds  Exam reveals no gallop and no friction rub  No murmur heard  Pulmonary/Chest: Effort normal and breath sounds normal  No respiratory distress  She has no wheezes  She has no rales  Abdominal: Soft  She exhibits no distension  There is no tenderness  There is no rebound and no guarding  Musculoskeletal: Normal range of motion  She exhibits no edema  Neurological: She is alert and oriented to person, place, and time  Skin: Skin is warm and dry  Psychiatric: She has a normal mood and affect  Her behavior is normal  Judgment and thought content normal    Nursing note and vitals reviewed        Vital Signs  ED Triage Vitals [09/28/18 0821]   Temperature Pulse Respirations Blood Pressure SpO2   (!) 97 4 °F (36 3 °C) 60 18 136/53 93 %      Temp Source Heart Rate Source Patient Position - Orthostatic VS BP Location FiO2 (%)   Tympanic Monitor Lying Right arm --      Pain Score       --           Vitals:    09/28/18 0821   BP: 136/53   Pulse: 60   Patient Position - Orthostatic VS: Lying       Visual Acuity      ED Medications  Medications - No data to display    Diagnostic Studies  Results Reviewed     Procedure Component Value Units Date/Time    UA w Reflex to Microscopic [[id number]]  (Normal) Collected:  09/28/18 0956    Lab Status:  Final result Specimen:  Urine from Urine, Clean Catch Updated:  09/28/18 1009     Color, UA Yellow     Clarity, UA Clear     Specific Gravity, UA 1 015     pH, UA 7 0     Leukocytes, UA Negative     Nitrite, UA Negative     Protein, UA Negative mg/dl      Glucose, UA Negative mg/dl      Ketones, UA Negative mg/dl      Urobilinogen, UA 1 0 E U /dl      Bilirubin, UA Negative     Blood, UA Negative    Comprehensive metabolic panel [[id number]]  (Abnormal) Collected:  09/28/18 8066    Lab Status:  Final result Specimen:  Blood from Arm, Left Updated:  09/28/18 1005     Sodium 138 mmol/L      Potassium 4 4 mmol/L      Chloride 97 (L) mmol/L      CO2 31 mmol/L      ANION GAP 10 mmol/L      BUN 16 mg/dL      Creatinine 0 97 mg/dL      Glucose 104 mg/dL      Calcium 9 7 mg/dL      AST 20 U/L      ALT 12 U/L      Alkaline Phosphatase 110 U/L      Total Protein 6 5 g/dL      Albumin 3 6 g/dL      Total Bilirubin 1 20 (H) mg/dL      eGFR 49 ml/min/1 73sq m     Narrative:         National Kidney Disease Education Program recommendations are as follows:  GFR calculation is accurate only with a steady state creatinine  Chronic Kidney disease less than 60 ml/min/1 73 sq  meters  Kidney failure less than 15 ml/min/1 73 sq  meters      CBC and differential [[id number]]  (Abnormal) Collected:  09/28/18 0922    Lab Status:  Final result Specimen:  Blood from Arm, Left Updated:  09/28/18 0949     WBC 11 20 (H) Thousand/uL      RBC 4 92 Million/uL      Hemoglobin 13 1 g/dL      Hematocrit 41 4 %      MCV 84 fL      MCH 26 7 (L) pg      MCHC 31 7 g/dL      RDW 15 7 (H) %      MPV 8 1 (L) fL      Platelets 743 Thousands/uL      nRBC 0 /100 WBCs      Neutrophils Relative 84 (H) %      Lymphocytes Relative 7 (L) %      Monocytes Relative 9 %      Eosinophils Relative 0 %      Basophils Relative 0 %      Neutrophils Absolute 9 30 (H) Thousands/µL      Lymphocytes Absolute 0 80 Thousands/µL      Monocytes Absolute 1 00 Thousand/µL      Eosinophils Absolute 0 00 Thousand/µL      Basophils Absolute 0 00 Thousands/µL                  CT head without contrast   Final Result by Garrett Pastrana (09/28 0943)   No acute territorial vascular infarction, intracranial hemorrhage or   fracture line is detected  Stable encephalomalacia involving the left parietal lobe and right   cerebellar hemisphere  Features of chronic microvascular ischemia  Signed by Garrett Pastrana MD      XR chest 2 views   Final Result by Josep Echeverria MD (09/28 7301)   Cardiomegaly  Cardiac pacemaker  No active infiltrate or vascular   congestion at this time  Signed by Josep Echeverria MD      XR elbow 2 views RIGHT   Final Result by Bashir Santos (09/28 0523)   No fracture  Signed by Danii Cardenas      XR hip/pelv 2-3 vws right if performed   Final Result by Josep Echeverria MD (09/28 1348)   No fracture or dislocation involving the pelvis or right hip  Signed by Josep Echeverria MD      CT hip right without contrast    (Results Pending)              Procedures  Procedures       Phone Contacts  ED Phone Contact    ED Course                               [facility] Time    Disposition  Final diagnoses:   None     ED Disposition     None      Follow-up Information    None         Patient's Medications   Discharge Prescriptions    No medications on file     No discharge procedures on file      ED Provider  Electronically Signed by           Juan Gardiner MD  09/28/18 6363

## 2018-09-28 NOTE — EMTALA/ACUTE CARE TRANSFER
[address]  [address] [place] [address] [id number] [phone]  Dept: [address] [facility] CONSENT    NAME Mercy Ramirez                                         1921                              MRN [id number]    I have been informed of my rights regarding examination, treatment, and transfer   by Dr Juan Gardiner MD    Benefits: Specialized equipment and/or services available at the receiving facility (Include comment)________________________    Risks: Potential for delay in receiving treatment      Transfer Request   I acknowledge that my medical condition has been evaluated and explained to me by the emergency department physician or other qualified medical person and/or my attending physician who has recommended and offered to me further medical examination and treatment  I understand the Hospital's obligation with respect to the treatment and stabilization of my emergency medical condition  I nevertheless request to be transferred  I release the Hospital, the doctor, and any other persons caring for me from all responsibility or liability for any injury or ill effects that may result from my transfer and agree to accept all responsibility for the consequences of my choice to transfer, rather than receive stabilizing treatment at the Hospital  I understand that because the transfer is my request, my insurance may not provide reimbursement for the services  The Hospital will assist and direct me and my family in how to make arrangements for transfer, but the hospital is not liable for any fees charged by the transport service  In spite of this understanding, I refuse to consent to further medical examination and treatment which has been offered to me, and request transfer to  Angy Bruno Name, [address] : [address] [facility]   I authorize the performance of emergency medical procedures and treatments upon me in both transit and upon arrival at the receiving facility  Additionally, I authorize the release of any and all medical records to the receiving facility and request they be transported with me, if possible  I authorize the performance of emergency medical procedures and treatments upon me in both transit and upon arrival at the receiving facility  Additionally, I authorize the release of any and all medical records to the receiving facility and request they be transported with me, if possible  I understand that the safest mode of transportation during a medical emergency is an ambulance and that the Hospital advocates the use of this mode of transport  Risks of traveling to the receiving facility by car, including absence of medical control, life sustaining equipment, such as oxygen, and medical personnel has been explained to me and I fully understand them  (FAREED CORRECT BOX BELOW)  [  ]  I consent to the stated transfer and to be transported by ambulance/helicopter  [  ]  I consent to the stated transfer, but refuse transportation by ambulance and accept full responsibility for my transportation by car  I understand the risks of non-ambulance transfers and I exonerate the Hospital and its staff from any deterioration in my condition that results from this refusal     X___________________________________________    DATE  18  TIME________  Signature of patient or legally responsible individual signing on patient behalf           RELATIONSHIP TO PATIENT_________________________          Provider [facility] 1921                              MRN [id number]    A medical screening exam was performed on the above named patient  Based on the examination:    Condition Necessitating Transfer There were no encounter diagnoses      Patient Condition: The patient has been stabilized such that within reasonable medical probability, no material deterioration of the patient condition or the condition of the unborn child(ismael) is likely to result from the transfer    Reason for Transfer: Level of Care needed not available at this facility    Transfer Requirements: Facility [facility]   · Space available and qualified personnel available for treatment as acknowledged by    · Agreed to accept transfer and to provide appropriate medical treatment as acknowledged by       Henry  · Appropriate medical records of the examination and treatment of the patient are provided at the time of transfer   [address] _______  · Transfer will be performed by qualified personnel from    and appropriate transfer equipment as required, including the use of necessary and appropriate life support measures  Provider Certification: I have examined the patient and explained the following risks and benefits of being transferred/refusing transfer to the patient/family:  General risk, such as traffic hazards, adverse weather conditions, rough terrain or turbulence, possible failure of equipment (including vehicle or aircraft), or consequences of actions of persons outside the control of the transport personnel      Based on these reasonable risks and benefits to the patient and/or the unborn child(ismael), and based upon the information available at the time of the patients examination, I certify that the medical benefits reasonably to be expected from the provision of appropriate medical treatments at another medical facility outweigh the increasing risks, if any, to the individuals medical condition, and in the case of labor to the unborn child, from effecting the transfer      X____________________________________________ DATE 09/28/18        TIME_______      ORIGINAL - SEND TO MEDICAL RECORDS   COPY - SEND WITH PATIENT DURING TRANSFER

## 2018-09-28 NOTE — ASSESSMENT & PLAN NOTE
· This is secondary to complete heart block    And history of coronary artery disease with stenting  · Continue aspirin

## 2018-09-29 LAB
ALBUMIN SERPL BCP-MCNC: 3.5 G/DL (ref 3.5–5.7)
ALP SERPL-CCNC: 92 U/L (ref 55–165)
ALT SERPL W P-5'-P-CCNC: 10 U/L (ref 7–52)
ANION GAP SERPL CALCULATED.3IONS-SCNC: 7 MMOL/L (ref 4–13)
AST SERPL W P-5'-P-CCNC: 19 U/L (ref 13–39)
BASOPHILS # BLD AUTO: 0 THOUSANDS/ΜL (ref 0–0.1)
BASOPHILS NFR BLD AUTO: 0 % (ref 0–2)
BILIRUB SERPL-MCNC: 1.2 MG/DL (ref 0.2–1)
BUN SERPL-MCNC: 14 MG/DL (ref 7–25)
CALCIUM SERPL-MCNC: 9.6 MG/DL (ref 8.6–10.5)
CHLORIDE SERPL-SCNC: 100 MMOL/L (ref 98–107)
CO2 SERPL-SCNC: 32 MMOL/L (ref 21–31)
CREAT SERPL-MCNC: 0.83 MG/DL (ref 0.6–1.2)
EOSINOPHIL # BLD AUTO: 0 THOUSAND/ΜL (ref 0–0.61)
EOSINOPHIL NFR BLD AUTO: 0 % (ref 0–5)
ERYTHROCYTE [DISTWIDTH] IN BLOOD BY AUTOMATED COUNT: 15.7 % (ref 11.5–14.5)
GFR SERPL CREATININE-BSD FRML MDRD: 59 ML/MIN/1.73SQ M
GLUCOSE SERPL-MCNC: 82 MG/DL (ref 65–99)
HCT VFR BLD AUTO: 41.1 % (ref 34.8–46.1)
HGB BLD-MCNC: 13.5 G/DL (ref 12–16)
INR PPP: 1.03 (ref 0.9–1.5)
LYMPHOCYTES # BLD AUTO: 1.1 THOUSANDS/ΜL (ref 0.6–4.47)
LYMPHOCYTES NFR BLD AUTO: 12 % (ref 21–51)
MCH RBC QN AUTO: 27.7 PG (ref 26–34)
MCHC RBC AUTO-ENTMCNC: 32.8 G/DL (ref 31–37)
MCV RBC AUTO: 84 FL (ref 81–99)
MONOCYTES # BLD AUTO: 1 THOUSAND/ΜL (ref 0.17–1.22)
MONOCYTES NFR BLD AUTO: 11 % (ref 2–12)
NEUTROPHILS # BLD AUTO: 6.6 THOUSANDS/ΜL (ref 1.4–6.5)
NEUTS SEG NFR BLD AUTO: 76 % (ref 42–75)
NRBC BLD AUTO-RTO: 0 /100 WBCS
PLATELET # BLD AUTO: 156 THOUSANDS/UL (ref 149–390)
PMV BLD AUTO: 8.8 FL (ref 8.6–11.7)
POTASSIUM SERPL-SCNC: 4.1 MMOL/L (ref 3.5–5.5)
PROT SERPL-MCNC: 6.4 G/DL (ref 6.4–8.9)
PROTHROMBIN TIME: 12 SECONDS (ref 10.1–12.9)
RBC # BLD AUTO: 4.87 MILLION/UL (ref 3.9–5.2)
SODIUM SERPL-SCNC: 139 MMOL/L (ref 134–143)
WBC # BLD AUTO: 8.7 THOUSAND/UL (ref 4.8–10.8)

## 2018-09-29 PROCEDURE — 85025 COMPLETE CBC W/AUTO DIFF WBC: CPT | Performed by: NURSE PRACTITIONER

## 2018-09-29 PROCEDURE — 99232 SBSQ HOSP IP/OBS MODERATE 35: CPT | Performed by: NURSE PRACTITIONER

## 2018-09-29 PROCEDURE — 80053 COMPREHEN METABOLIC PANEL: CPT | Performed by: NURSE PRACTITIONER

## 2018-09-29 PROCEDURE — 85610 PROTHROMBIN TIME: CPT | Performed by: NURSE PRACTITIONER

## 2018-09-29 PROCEDURE — 93005 ELECTROCARDIOGRAM TRACING: CPT

## 2018-09-29 RX ADMIN — HEPARIN SODIUM 5000 UNITS: 5000 INJECTION INTRAVENOUS; SUBCUTANEOUS at 15:04

## 2018-09-29 RX ADMIN — LEVOTHYROXINE SODIUM 25 MCG: 25 TABLET ORAL at 06:45

## 2018-09-29 RX ADMIN — ASPIRIN 325 MG: 325 TABLET ORAL at 10:37

## 2018-09-29 RX ADMIN — HEPARIN SODIUM 5000 UNITS: 5000 INJECTION INTRAVENOUS; SUBCUTANEOUS at 06:45

## 2018-09-29 RX ADMIN — VITAMIN D, TAB 1000IU (100/BT) 2000 UNITS: 25 TAB at 10:37

## 2018-09-29 RX ADMIN — DEXTRAN 70 AND HYPROMELLOSE 2910 1 DROP: 1; 3 SOLUTION/ DROPS OPHTHALMIC at 20:15

## 2018-09-29 RX ADMIN — SODIUM CHLORIDE 75 ML/HR: 9 INJECTION, SOLUTION INTRAVENOUS at 06:46

## 2018-09-29 RX ADMIN — METOPROLOL TARTRATE 12.5 MG: 25 TABLET ORAL at 10:36

## 2018-09-29 RX ADMIN — PANTOPRAZOLE SODIUM 40 MG: 40 TABLET, DELAYED RELEASE ORAL at 10:35

## 2018-09-29 RX ADMIN — METOPROLOL TARTRATE 12.5 MG: 25 TABLET ORAL at 20:15

## 2018-09-29 RX ADMIN — DEXTRAN 70 AND HYPROMELLOSE 2910 1 DROP: 1; 3 SOLUTION/ DROPS OPHTHALMIC at 10:38

## 2018-09-29 RX ADMIN — HEPARIN SODIUM 5000 UNITS: 5000 INJECTION INTRAVENOUS; SUBCUTANEOUS at 21:45

## 2018-09-29 NOTE — CONSULTS
Consultation - [facility] [place] 80 y o  female MRN: [id number]  Unit/Bed#: -02 Encounter: [id number]      Assessment/Plan     Assessment:  Nondisplaced right iliac wing fracture  Plan:  No surgery is indicated  Out of bed  50% weight-bearing right lower extremity  PT/OT   consultation either to personal care facility versus skilled nursing facility    History of Present Illness   Physician Requesting Consult: Corrie Cruz MD  Reason for Consult / Principal Problem:   Fracture right iliac wing  HPI: Crys Payan is a 80y o  year old female who presents from [address] [place] [address] after sustaining a mechanical fall  She denies hitting her head or any loss of consciousness  She came to the emergency room where CT was performed  It showed a nondisplaced fracture of the right iliac wing    Consults    Review of Systems    Historical Information   Past Medical History:   Diagnosis Date    Aphasia as late effect of cerebrovascular accident     Arthritis     Atrioventricular block, complete ([address] )     CAD (coronary artery disease)     remote stenting    Cardiac pacemaker in situ 12/09/2009    Cerebral infarct [facility] [address] [facility]     due to unspecified occlusion and stenosis of unspecified cerebral artery    Disease of thyroid gland     Dysphagia     Essential (primary) hypertension     GERD (gastroesophageal reflux disease)     PUD    Gout     History of echocardiogram 04/27/2015    EF 55%, mild MR, mild TR      Mixed hyperlipidemia     Seizures (HCC)     Shoulder fracture, left     Stroke (HCC)     dysphagia, aphasia    Subarachnoid hemorrhage (HCC)     Wrist fracture, closed, left, with routine healing, subsequent encounter 2003     Past Surgical History:   Procedure Laterality Date   Lance Polio CARDIAC PACEMAKER PLACEMENT  12/09/2009    [employer]iotron[employer] Edvin PERKINS Model [id number] Serial # [id number]    CORONARY ANGIOPLASTY WITH STENT PLACEMENT      unsure of date    EYE SURGERY      FRACTURE SURGERY  SKIN BIOPSY       Social History   History   Alcohol Use No     History   Drug Use No     History   Smoking Status    Former Smoker    Types: Cigarettes   Smokeless Tobacco    Never Used     Family History: non-contributory    Meds/Allergies   all current active meds have been reviewed  No Known Allergies    Objective   Vitals: Blood pressure 168/68, pulse 60, temperature (!) 97 °F (36 1 °C), temperature source Tympanic, resp  rate 16, height 5' (1 524 m), weight 45 4 kg (100 lb), SpO2 93 %  ,Body mass index is 19 53 kg/m²  Intake/Output Summary (Last 24 hours) at 09/29/18 1221  Last data filed at 09/29/18 0936   Gross per 24 hour   Intake              240 ml   Output                0 ml   Net              240 ml     I/O last 24 hours: In: 240 [P O :240]  Out: -     Invasive Devices     Peripheral Intravenous Line            Peripheral IV 09/28/18 Left;Upper Forearm 1 day                Physical Exam  Ortho Exam     Patient is in bed, confused, and in no apparent distress  Her spine is midline  There is no ecchymosis swelling or pain present  There is bilateral lower extremity contractures  There is no ecchymosis along her hip or pelvic area  Rotation was fairly intact along the hip  There is no trochanteric pain  There is mild tenderness along the iliac body region  There is no pelvic instability  There is no tenderness along her thigh knee and lower leg area  There is a negative Homans  Lab Results: I have personally reviewed pertinent lab results  Imaging Studies: I have personally reviewed pertinent films in PACS  There is a nondisplaced fracture of the right iliac wing  There is no evidence of any hip fracture  EKG, Pathology, and Other Studies: I have personally reviewed pertinent reports      VTE Prophylaxis: Sequential compression device Kristine Lujan)     Code Status: Level 3 - DNAR and DNI  Advance Directive and Living Will:      Power of :    POLST:      Counseling / Coordination of Care  Total floor / unit time spent today 30 minutes  Greater than 50% of total time was spent with the patient and / or family counseling and / or coordination of care   A description of the counseling / coordination of care:

## 2018-09-29 NOTE — PLAN OF CARE
MUSCULOSKELETAL - ADULT     Maintain or return mobility to safest level of function Progressing     Maintain proper alignment of affected body part Progressing        Nutrition/Hydration-ADULT     Nutrient/Hydration intake appropriate for improving, restoring or maintaining nutritional needs Progressing        Potential for Falls     Patient will remain free of falls Progressing        Prexisting or High Potential for Compromised Skin Integrity     Skin integrity is maintained or improved Progressing        SKIN/TISSUE INTEGRITY - ADULT     Skin integrity remains intact Progressing     Incision(s), wounds(s) or drain site(s) healing without S/S of infection Progressing

## 2018-09-29 NOTE — SOCIAL WORK
CM met with pt at bedside and explained role  Pt is a resident of [facility]  She is currently active with [address]  No surgery is indicated for pt's fracture  Pt will require STR in an acute rehab vs  SNF setting  CM will discuss same with pt daughter due to pt history of Dementia  CM to follow  CM reviewed d/c planning process including the following: identifying help at home, patient preference for d/c planning needs, availability of treatment team to discuss questions or concerns patient and/or family may have regarding understanding medications and recognizing signs and symptoms once discharged  CM also encouraged patient to follow up with all recommended appointments after discharge  Patient advised of importance for patient and family to participate in managing patients medical well being

## 2018-09-29 NOTE — CASE MANAGEMENT
Initial Clinical Review  Admission: Date/Time/Statement: 9/28/18 @ 1256   Orders Placed This Encounter   Procedures    Inpatient Admission     Standing Status:   Standing     Number of Occurrences:   1     Order Specific Question:   Admitting Physician     Answer:   Brandon NEGRON [[id number]]     Order Specific Question:   Level of Care     Answer:   Med Surg [16]     Order Specific Question:   Estimated length of stay     Answer:   More than 2 Midnights     Order Specific Question:   Certification     Answer:   I certify that inpatient services are medically necessary for this patient for a duration of greater than two midnights  See H&P and MD Progress Notes for additional information about the patient's course of treatment  History of Illness:   TRANSFERRED FROM [facility]  25-year-old female nursing home resident who is brought in by EMS because she fell  ED Vital Signs:   ED Triage Vitals   Temperature Pulse Respirations Blood Pressure SpO2   09/28/18 2248 09/28/18 2248 09/28/18 2248 09/28/18 2248 09/29/18 0839   (!) 96 5 °F (35 8 °C) 83 16 155/73 93 %      Temp Source Heart Rate Source Patient Position - Orthostatic VS BP Location FiO2 (%)   09/28/18 2248 09/28/18 2248 09/28/18 2248 09/28/18 2248 --   Tympanic Monitor Lying Right arm       Pain Score       09/28/18 1328       3        Wt Readings from Last 1 Encounters:   09/28/18 45 4 kg (100 lb)   Vital Signs (abnormal):   T 96 5  Abnormal Labs/Diagnostic Test Results:   WBC 11 20 CL 97 TBILI 1 20 GFR 49   XRAY R HIP/PELVIS=No fracture or dislocation involving the pelvis or right hip  CXR=Cardiomegaly  Cardiac pacemaker  No active infiltrate or vascular  congestion at this time  R ELBOW XRAY=No fracture  CT HEAD=No acute territorial vascular infarction, intracranial hemorrhage or  fracture line is detected  Stable encephalomalacia involving the left parietal lobe and right  cerebellar hemisphere  Features of chronic microvascular ischemia    CT R HIP/PELVIS=Partially visualized fracture of the right iliac wing  EKG=  Ventricular Rate BPM 60    Atrial Rate BPM 60    AR Interval ms 244    QRSD Interval ms 114    QT Interval ms 450    QTC Interval ms 450    P Axis degrees 89    QRS Axis degrees 100    T Wave Axis degrees 132      Atrial-paced rhythm with prolonged AV conduction  Incomplete right bundle branch block  Possible Right ventricular hypertrophy  ST & Marked T wave abnormality, consider anterolateral ischemia      ED Treatment:   Medication Administration - No Administrations Displayed (No Start Event Found)     None      Past Medical/Surgical History:    Active Ambulatory Problems     Diagnosis Date Noted    Mixed hyperlipidemia 08/15/2018    Hypothyroidism 08/15/2018    Complete atrioventricular block ([address] ) 08/15/2018    Cardiac pacemaker in situ 08/15/2018    CVA (cerebral vascular accident) ([address] ) 08/15/2018    Cardiac murmur 08/15/2018    Dyslipidemia 08/15/2018    Hypertension 08/15/2018    Dementia 08/22/2018    Pacemaker failure 09/04/2018     Resolved Ambulatory Problems     Diagnosis Date Noted    Syncope 08/22/2018     Past Medical History:   Diagnosis Date    Aphasia as late effect of cerebrovascular accident     Arthritis     Atrioventricular block, complete ([address] )     CAD (coronary artery disease)     Cardiac pacemaker in situ 12/09/2009    Cerebral infarct ([address] )     Disease of thyroid gland     Dysphagia     Essential (primary) hypertension     GERD (gastroesophageal reflux disease)     Gout     History of echocardiogram 04/27/2015    Mixed hyperlipidemia     Seizures (HCC)     Shoulder fracture, left     Stroke (HCC)     Subarachnoid hemorrhage (HCC)     Wrist fracture, closed, left, with routine healing, subsequent encounter 2003   Admitting Diagnosis: Hip pain, right [M25 551]  Age/Sex: 80 y o  female  Assessment/Plan:   Closed fracture of right iliac wing ([address] )   Assessment & Plan     · Patient had fallen out of her bed last evening and landed on her right hip  · She is a resident at [facility]  · X-rays performed showed no fracture, however CT of the right hip did show a fracture in the right iliac wing    · Orthopedics have been consulted  · PT OT  · Pain control   PER ORTHO:  Assessment:  Nondisplaced right iliac wing fracture  Plan:  No surgery is indicated  Out of bed  50% weight-bearing right lower extremity  PT/OT   consultation either to personal care facility versus skilled nursing facility  Admission Orders:  MED SURG  CONSULT ORTHO  PT/OT Aminah Melara  Scheduled Meds:   Current Facility-Administered Medications:  acetaminophen 650 mg Oral Q6H PRN Symone Blandon, CRNP    aspirin 325 mg Oral Daily Symone A Barber, CRNP    cholecalciferol 2,000 Units Oral Daily Symone A Barber, SULMANP    dextran 70-hypromellose 1 drop Both Eyes Q12H [address] Symone Blandon, SULMANP    heparin (porcine) 5,000 Units Subcutaneous Q8H [address] [facility] MARTELL Sandoval    levothyroxine 25 mcg Oral Early Morning Symone A Barber, SULMANP    metoprolol tartrate 12 5 mg Oral Q12H [address] Symone A Barber, SULMANP    ondansetron 4 mg Intravenous Q6H PRN Symone A Barber, CRNP    pantoprazole 40 mg Oral Daily Symone A Barber, CRNP    sodium chloride 75 mL/hr Intravenous Continuous MARTELL Mendez Last Rate: 75 mL/hr (09/29/18 0646)     Continuous Infusions:   sodium chloride 75 mL/hr Last Rate: 75 mL/hr (09/29/18 0646)     PRN Meds:   acetaminophen    ondansetron

## 2018-09-29 NOTE — ASSESSMENT & PLAN NOTE
· Orthopedic surgeon input is appreciated  · There is no surgery indicated at this time  · Will continue with PT and OT with 50% weight-bearing on the right lower extremity  · Continue with pain control

## 2018-09-29 NOTE — PROGRESS NOTES
Progress Note - Luis Daniel Conley 5/2/1921, 80 y o  female MRN: [id number]    Unit/Bed#: -02 Encounter: [id number]    Primary Care Provider: Eliceo Douglas MD   Date and time admitted to hospital: 9/28/2018 12:56 PM        * Closed fracture of right iliac wing [facility])   Assessment & Plan    · Orthopedic surgeon input is appreciated  · There is no surgery indicated at this time  · Will continue with PT and OT with 50% weight-bearing on the right lower extremity  · Continue with pain control  Gastroesophageal reflux disease without esophagitis   Assessment & Plan    · Continue Protonix     Fall from bed   Assessment & Plan    · Patient does have a history of dementia however patient states that she was getting up to go to the bathroom when she slipped off of the bed and fell onto the floor at home  · Fall precautions     Dementia   Assessment & Plan    · Continue patient's home medications  · She is stable     Hypertension   Assessment & Plan    · Continue metoprolol     Cardiac pacemaker in situ   Assessment & Plan    · This is secondary to complete heart block  And history of coronary artery disease with stenting  · Continue aspirin     Hypothyroidism   Assessment & Plan    · Continue Synthroid         VTE Pharmacologic Prophylaxis: Pharmacologic: Heparin    Patient Centered Rounds: I have performed bedside rounds with nursing staff today  Discussions with Specialists or Other Care Team Provider: nursing and ortho   Education and Discussions with Family / Patient: patient     Time Spent for Care: 20 minutes  More than 50% of total time spent on counseling and coordination of care as described above      Current Length of Stay: 1 day(s)    Current Patient Status: Inpatient   Certification Statement: The patient will continue to require additional inpatient hospital stay due to hip fracture     Discharge Plan: pending hospital course     Code Status: Level 3 - DNAR and DNI    Subjective:   Feeling marco a  Denies any pain  Objective:     Vitals:   Temp (24hrs), Av 2 °F (36 2 °C), Min:96 5 °F (35 8 °C), Max:98 1 °F (36 7 °C)    HR:  [42-83] 53  Resp:  [16] 16  BP: (108-168)/(54-73) 108/54  SpO2:  [93 %-97 %] 97 %  Body mass index is 19 53 kg/m²  Input and Output Summary (last 24 hours): Intake/Output Summary (Last 24 hours) at 18 175  Last data filed at 18 1300   Gross per 24 hour   Intake              480 ml   Output                0 ml   Net              480 ml       Physical Exam:     Physical Exam   Constitutional: She appears well-developed and well-nourished  HENT:   Head: Normocephalic and atraumatic  Mouth/Throat: Oropharynx is clear and moist    Eyes: Pupils are equal, round, and reactive to light  Conjunctivae and EOM are normal    Neck: Normal range of motion  Neck supple  Cardiovascular: Normal rate, regular rhythm and normal heart sounds  Exam reveals no gallop and no friction rub  No murmur heard  Pulmonary/Chest: Effort normal  She has no wheezes  She has no rales  Decreased breath sounds      Abdominal: Soft  Bowel sounds are normal  She exhibits no distension  There is no tenderness  There is no rebound  Musculoskeletal: She exhibits no edema, tenderness or deformity  Decreased ROM both legs  Left 4/5 and right 3/5  Neurological: She is alert  No cranial nerve deficit  Confused baseline with h/o dementia  Follows command appropriately  Skin: Skin is warm and dry  No rash noted  No erythema  Psychiatric: She has a normal mood and affect  Her behavior is normal    Vitals reviewed        Additional Data:     Labs:      Results from last 7 days  Lab Units 18  0511   WBC Thousand/uL 8 70   HEMOGLOBIN g/dL 13 5   HEMATOCRIT % 41 1   PLATELETS Thousands/uL 156   NEUTROS PCT % 76*   LYMPHS PCT % 12*   MONOS PCT % 11   EOS PCT % 0       Results from last 7 days  Lab Units 18  0511   SODIUM mmol/L 139   POTASSIUM mmol/L 4 1   CHLORIDE mmol/L 100   CO2 mmol/L 32*   BUN mg/dL 14   CREATININE mg/dL 0 83   CALCIUM mg/dL 9 6   ALK PHOS U/L 92   ALT U/L 10   AST U/L 19       Results from last 7 days  Lab Units 09/29/18  0511   INR  1 03               * I Have Reviewed All Lab Data Listed Above  * Additional Pertinent Lab Tests Reviewed: [address] Admission  Reviewed    Imaging:  Imaging Reports Reviewed Today Include: xray and CT     Recent Cultures (last 7 days):           Last 24 Hours Medication List:     Current Facility-Administered Medications:  acetaminophen 650 mg Oral Q6H PRN MARTELL Mendez   aspirin 325 mg Oral Daily MARTELL Mendez   cholecalciferol 2,000 Units Oral Daily MARTELL Mendez   dextran 70-hypromellose 1 drop Both Eyes Q12H [address] MARTELL Mendez   heparin (porcine) 5,000 Units Subcutaneous Q8H [address] MARTELL Mendez   levothyroxine 25 mcg Oral Early Morning MARTELL Mendez   metoprolol tartrate 12 5 mg Oral Q12H [address] MARTELL Mendez   ondansetron 4 mg Intravenous Q6H PRN MARTELL Mendez   pantoprazole 40 mg Oral Daily MARTELL Mendez        Today, Patient Was Seen By: MARTELL Arias    ** Please Note: Dictation voice to text software may have been used in the creation of this document   **

## 2018-09-30 LAB
ALBUMIN SERPL BCP-MCNC: 2.8 G/DL (ref 3.5–5.7)
ALP SERPL-CCNC: 72 U/L (ref 55–165)
ALT SERPL W P-5'-P-CCNC: 8 U/L (ref 7–52)
ANION GAP SERPL CALCULATED.3IONS-SCNC: 4 MMOL/L (ref 4–13)
AST SERPL W P-5'-P-CCNC: 15 U/L (ref 13–39)
ATRIAL RATE: 67 BPM
BILIRUB SERPL-MCNC: 0.8 MG/DL (ref 0.2–1)
BUN SERPL-MCNC: 12 MG/DL (ref 7–25)
CALCIUM SERPL-MCNC: 9.1 MG/DL (ref 8.6–10.5)
CHLORIDE SERPL-SCNC: 101 MMOL/L (ref 98–107)
CO2 SERPL-SCNC: 32 MMOL/L (ref 21–31)
CREAT SERPL-MCNC: 0.74 MG/DL (ref 0.6–1.2)
ERYTHROCYTE [DISTWIDTH] IN BLOOD BY AUTOMATED COUNT: 15.9 % (ref 11.5–14.5)
GFR SERPL CREATININE-BSD FRML MDRD: 68 ML/MIN/1.73SQ M
GLUCOSE SERPL-MCNC: 92 MG/DL (ref 65–99)
HCT VFR BLD AUTO: 35.3 % (ref 34.8–46.1)
HGB BLD-MCNC: 11.7 G/DL (ref 12–16)
MCH RBC QN AUTO: 27.6 PG (ref 26–34)
MCHC RBC AUTO-ENTMCNC: 33.1 G/DL (ref 31–37)
MCV RBC AUTO: 84 FL (ref 81–99)
P AXIS: 79 DEGREES
PLATELET # BLD AUTO: 144 THOUSANDS/UL (ref 149–390)
PMV BLD AUTO: 8.5 FL (ref 8.6–11.7)
POTASSIUM SERPL-SCNC: 3.7 MMOL/L (ref 3.5–5.5)
PR INTERVAL: 216 MS
PROT SERPL-MCNC: 5.4 G/DL (ref 6.4–8.9)
QRS AXIS: -43 DEGREES
QRSD INTERVAL: 114 MS
QT INTERVAL: 424 MS
QTC INTERVAL: 448 MS
RBC # BLD AUTO: 4.23 MILLION/UL (ref 3.9–5.2)
SODIUM SERPL-SCNC: 137 MMOL/L (ref 134–143)
T WAVE AXIS: -54 DEGREES
VENTRICULAR RATE: 67 BPM
WBC # BLD AUTO: 8.1 THOUSAND/UL (ref 4.8–10.8)

## 2018-09-30 PROCEDURE — G8978 MOBILITY CURRENT STATUS: HCPCS

## 2018-09-30 PROCEDURE — 99232 SBSQ HOSP IP/OBS MODERATE 35: CPT | Performed by: NURSE PRACTITIONER

## 2018-09-30 PROCEDURE — 80053 COMPREHEN METABOLIC PANEL: CPT | Performed by: NURSE PRACTITIONER

## 2018-09-30 PROCEDURE — 97162 PT EVAL MOD COMPLEX 30 MIN: CPT

## 2018-09-30 PROCEDURE — 85027 COMPLETE CBC AUTOMATED: CPT | Performed by: NURSE PRACTITIONER

## 2018-09-30 PROCEDURE — G8979 MOBILITY GOAL STATUS: HCPCS

## 2018-09-30 RX ORDER — BISACODYL 10 MG
10 SUPPOSITORY, RECTAL RECTAL DAILY PRN
Status: DISCONTINUED | OUTPATIENT
Start: 2018-09-30 | End: 2018-10-02 | Stop reason: HOSPADM

## 2018-09-30 RX ORDER — SENNOSIDES 8.6 MG
1 TABLET ORAL
Status: DISCONTINUED | OUTPATIENT
Start: 2018-09-30 | End: 2018-10-02 | Stop reason: HOSPADM

## 2018-09-30 RX ORDER — DOCUSATE SODIUM 100 MG/1
100 CAPSULE, LIQUID FILLED ORAL 2 TIMES DAILY
Status: DISCONTINUED | OUTPATIENT
Start: 2018-09-30 | End: 2018-10-02 | Stop reason: HOSPADM

## 2018-09-30 RX ORDER — VANCOMYCIN HYDROCHLORIDE 1 G/200ML
1000 INJECTION, SOLUTION INTRAVENOUS ONCE
Status: DISCONTINUED | OUTPATIENT
Start: 2018-09-30 | End: 2018-09-30

## 2018-09-30 RX ORDER — CLONIDINE HYDROCHLORIDE 0.1 MG/1
0.1 TABLET ORAL
Status: DISCONTINUED | OUTPATIENT
Start: 2018-09-30 | End: 2018-10-02 | Stop reason: HOSPADM

## 2018-09-30 RX ADMIN — PANTOPRAZOLE SODIUM 40 MG: 40 TABLET, DELAYED RELEASE ORAL at 10:40

## 2018-09-30 RX ADMIN — METOPROLOL TARTRATE 12.5 MG: 25 TABLET ORAL at 10:40

## 2018-09-30 RX ADMIN — ASPIRIN 325 MG: 325 TABLET ORAL at 10:40

## 2018-09-30 RX ADMIN — HEPARIN SODIUM 5000 UNITS: 5000 INJECTION INTRAVENOUS; SUBCUTANEOUS at 21:21

## 2018-09-30 RX ADMIN — HEPARIN SODIUM 5000 UNITS: 5000 INJECTION INTRAVENOUS; SUBCUTANEOUS at 05:20

## 2018-09-30 RX ADMIN — HEPARIN SODIUM 5000 UNITS: 5000 INJECTION INTRAVENOUS; SUBCUTANEOUS at 15:39

## 2018-09-30 RX ADMIN — SENNOSIDES 8.6 MG: 8.6 TABLET, FILM COATED ORAL at 21:21

## 2018-09-30 RX ADMIN — VITAMIN D, TAB 1000IU (100/BT) 2000 UNITS: 25 TAB at 10:41

## 2018-09-30 RX ADMIN — DOCUSATE SODIUM 100 MG: 100 CAPSULE, LIQUID FILLED ORAL at 18:33

## 2018-09-30 RX ADMIN — DEXTRAN 70 AND HYPROMELLOSE 2910 1 DROP: 1; 3 SOLUTION/ DROPS OPHTHALMIC at 20:30

## 2018-09-30 RX ADMIN — CLONIDINE HYDROCHLORIDE 0.1 MG: 0.1 TABLET ORAL at 00:11

## 2018-09-30 RX ADMIN — DEXTRAN 70 AND HYPROMELLOSE 2910 1 DROP: 1; 3 SOLUTION/ DROPS OPHTHALMIC at 10:41

## 2018-09-30 RX ADMIN — LEVOTHYROXINE SODIUM 25 MCG: 25 TABLET ORAL at 05:20

## 2018-09-30 RX ADMIN — METOPROLOL TARTRATE 12.5 MG: 25 TABLET ORAL at 20:30

## 2018-09-30 NOTE — SOCIAL WORK
PT recommended STR  Pt denied by ARU as they did not fele she would be able to tolerate the therapy  Discussed same with pt daughter Leti Murrell  She is in agreement with STR at a SNF  She was given options for same and chose [facility]  CM to make referral to same  CM made her aware may need more options if those SNFs unable to accept  Daughter to discuss same with her sister  CM to follow

## 2018-09-30 NOTE — PROGRESS NOTES
Progress Note - Maribel Rendon 5/2/1921, 80 y o  female MRN: [id number]    Unit/Bed#: -02 Encounter: [id number]    Primary Care Provider: Lay Hastings MD   Date and time admitted to hospital: 9/28/2018 12:56 PM        * Closed fracture of right iliac wing [facility])   Assessment & Plan    · Orthopedic surgeon input is appreciated  · There is no surgery indicated at this time  · Will continue with PT and OT with 50% weight-bearing on the right lower extremity  · Continue with pain control  Gastroesophageal reflux disease without esophagitis   Assessment & Plan    · Continue Protonix     Fall from bed   Assessment & Plan    · Patient does have a history of dementia however patient states that she was getting up to go to the bathroom when she slipped off of the bed and fell onto the floor at home  · Fall precautions     Dementia   Assessment & Plan    · Continue patient's home medications  · She is stable     Hypertension   Assessment & Plan    · Continue metoprolol     Cardiac pacemaker in situ   Assessment & Plan    · This is secondary to complete heart block  And history of coronary artery disease with stenting  · Continue aspirin  · I spoke with Dr Sandip Worthington as there is some confusion about patient getting antibiotic and NPO after [facility] order  Patient will need to get battery exchange and per daughter- Babak Fears this is scheduled on 10/15/18  Hypothyroidism   Assessment & Plan    · Continue Synthroid     Mixed hyperlipidemia   Assessment & Plan    · Stable         VTE Pharmacologic Prophylaxis: Pharmacologic: Heparin    Patient Centered Rounds: I have performed bedside rounds with nursing staff today  Discussions with Specialists or Other Care Team Provider: nursing  Education and Discussions with Family / Patient: daughter regarding to discharge planning     Time Spent for Care: 20 minutes    More than 50% of total time spent on counseling and coordination of care as described above     Current Length of Stay: 2 day(s)    Current Patient Status: Inpatient   Certification Statement: The patient will continue to require additional inpatient hospital stay due to pending discharge placement     Discharge Plan: likely in AM     Code Status: Level 3 - DNAR and DNI    Subjective:   Offers no complaints  Denies any pain  Objective:     Vitals:   Temp (24hrs), Av 1 °F (36 2 °C), Min:96 6 °F (35 9 °C), Max:98 1 °F (36 7 °C)    HR:  [53-60] 60  Resp:  [15-16] 15  BP: (108-187)/(54-87) 110/58  SpO2:  [97 %-98 %] 98 %  Body mass index is 19 53 kg/m²  Input and Output Summary (last 24 hours): Intake/Output Summary (Last 24 hours) at 18 1410  Last data filed at 18 1401   Gross per 24 hour   Intake              480 ml   Output                0 ml   Net              480 ml       Physical Exam:     Physical Exam   Constitutional: She appears well-developed  No distress  Frail elderly female      HENT:   Head: Normocephalic and atraumatic  Mouth/Throat: Oropharynx is clear and moist    Eyes: Pupils are equal, round, and reactive to light  Conjunctivae and EOM are normal    Neck: Normal range of motion  Neck supple  Cardiovascular: Normal rate, regular rhythm and normal heart sounds  Exam reveals no gallop and no friction rub  No murmur heard  Pulmonary/Chest: Effort normal  She has no wheezes  She has no rales  Decreased     Abdominal: Soft  Bowel sounds are normal  She exhibits no distension  There is no tenderness  There is no rebound  Musculoskeletal: Normal range of motion  She exhibits no edema, tenderness or deformity  Neurological: She is alert  No cranial nerve deficit  Baseline confusion with dementia      Skin: Skin is warm and dry  No rash noted  No erythema  Psychiatric: She has a normal mood and affect  Vitals reviewed        Additional Data:     Labs:      Results from last 7 days  Lab Units 18  0454 18  0511   WBC Thousand/uL 8  10 8 70   HEMOGLOBIN g/dL 11 7* 13 5   HEMATOCRIT % 35 3 41 1   PLATELETS Thousands/uL 144* 156   NEUTROS PCT %  --  76*   LYMPHS PCT %  --  12*   MONOS PCT %  --  11   EOS PCT %  --  0       Results from last 7 days  Lab Units 09/30/18  0454   SODIUM mmol/L 137   POTASSIUM mmol/L 3 7   CHLORIDE mmol/L 101   CO2 mmol/L 32*   BUN mg/dL 12   CREATININE mg/dL 0 74   CALCIUM mg/dL 9 1   ALK PHOS U/L 72   ALT U/L 8   AST U/L 15       Results from last 7 days  Lab Units 09/29/18  0511   INR  1 03               * I Have Reviewed All Lab Data Listed Above  * Additional Pertinent Lab Tests Reviewed: [address] Admission  Reviewed    Imaging:  Imaging Reports Reviewed Today Include: n/a     Recent Cultures (last 7 days):           Last 24 Hours Medication List:     Current Facility-Administered Medications:  acetaminophen 650 mg Oral Q6H PRN MARTELL Mendez   aspirin 325 mg Oral Daily MARTELL Mendez   cefazolin 1,000 mg Intravenous Once Chito Bansal MD   cholecalciferol 2,000 Units Oral Daily MARTELL Mendez   cloNIDine 0 1 mg Oral Q3H PRN Deisy Villegas PA-C   dextran 70-hypromellose 1 drop Both Eyes Q12H [facility] MARTELL Mendez   heparin (porcine) 5,000 Units Subcutaneous Q8H [facility] MARTELL Mendez   levothyroxine 25 mcg Oral Early Morning MARTELL Mendez   metoprolol tartrate 12 5 mg Oral Q12H [facility] MARTELL Mendez   ondansetron 4 mg Intravenous Q6H PRN MARTELL Mendez   pantoprazole 40 mg Oral Daily MARTELL Mendez   vancomycin 1,000 mg Intravenous Once Chito Bansal MD        Today, Patient Was Seen By: MARTELL Bautista     ** Please Note: Dictation voice to text software may have been used in the creation of this document   **

## 2018-09-30 NOTE — PLAN OF CARE
Problem: PHYSICAL THERAPY ADULT  Goal: Performs mobility at highest level of function for planned discharge setting  See evaluation for individualized goals  Treatment/Interventions: Functional transfer training, LE strengthening/ROM, Therapeutic exercise, Bed mobility, Gait training          See flowsheet documentation for full assessment, interventions and recommendations  Outcome: Progressing  Prognosis: Good  Problem List: Decreased strength, Decreased endurance, Impaired balance, Decreased mobility, Decreased safety awareness, Orthopedic restrictions  Assessment: Pt is 80 y o  female seen for PT evaluation s/p admit to [facility] on 9/28/2018 w/ Closed fracture of right iliac wing ([address] )  PT consulted to assess pt's functional mobility and d/c needs  Order placed for PT eval and tx, w/ PWB R LE order  Comorbidities affecting pt's physical performance at time of assessment include: dementia, aphasia, CVA, arthritis, and CAD  PTA, pt was requiring A for mobility and resident of [facility]  Personal factors affecting pt at time of IE include: ambulating w/ assistive device, inability to ambulate household distances and positive fall history  Please find objective findings from PT assessment regarding body systems outlined above with impairments and limitations including weakness, impaired balance, decreased functional mobility tolerance, decreased safety awareness, fall risk and orthopedic restrictions  The following objective measures performed on IE also reveal limitations: Barthel Index: 10/100  Pt's clinical presentation is currently evolving seen in pt's presentation of dementia  Pt to benefit from continued PT tx to address deficits as defined above and maximize level of functional independent mobility and consistency  From PT/mobility standpoint, recommendation at time of d/c would be STR pending progress in order to facilitate return to PLOF          Recommendation: Short-term skilled PT     PT - OK to Discharge: No    See flowsheet documentation for full assessment     Claudio Taveras, PT

## 2018-09-30 NOTE — PHYSICAL THERAPY NOTE
Physical Therapy Evaluation     Patient's Name: Dave Martinez    Admitting Diagnosis  Hip pain, right [M25 [id number]]    Problem List  Patient Active Problem List   Diagnosis    Mixed hyperlipidemia    Hypothyroidism    [facility]    Cardiac pacemaker in situ    CVA (cerebral vascular accident) ([facility]    Cardiac murmur    Dyslipidemia    Hypertension    Dementia    Pacemaker failure    Closed fracture of right iliac wing ([facility] )    Fall from bed    Gastroesophageal reflux disease without esophagitis    Leukemoid reaction       Past Medical History  Past Medical History:   Diagnosis Date    Aphasia as late effect of cerebrovascular accident     Arthritis     Atrioventricular block, complete ([address] )     CAD (coronary artery disease)     remote stenting    Cardiac pacemaker in situ 12/09/2009    Cerebral infarct ([facility] )     due to unspecified occlusion and stenosis of unspecified cerebral artery    Disease of thyroid gland     Dysphagia     Essential (primary) hypertension     GERD (gastroesophageal reflux disease)     PUD    Gout     History of echocardiogram 04/27/2015    EF 55%, mild MR, mild TR      Mixed hyperlipidemia     Seizures (HCC)     Shoulder fracture, left     Stroke (HCC)     dysphagia, aphasia    Subarachnoid hemorrhage (HCC)     Wrist fracture, closed, left, with routine healing, subsequent encounter 2003       Past Surgical History  Past Surgical History:   Procedure Laterality Date   [facility] CARDIAC PACEMAKER PLACEMENT  12/09/2009    Darius Laguna DR Model [id number] Serial # [id number]    CORONARY ANGIOPLASTY WITH STENT PLACEMENT      unsure of date    EYE SURGERY      FRACTURE SURGERY      SKIN BIOPSY        09/30/18 0966   Note Type   Note type Eval/Treat   Pain Assessment   Pain Assessment No/denies pain   Pain Score No Pain   Home Living   Type of Home Assisted living  [facility])   Home Layout Ramped entrance   Bathroom Shower/Tub Walk-in shower   Bathroom Equipment Grab bars in shower; [address]   Prior Function   Level of [place] Needs assistance with ADLs and functional mobility   Lives With Facility staff   Receives Help From Personal care attendant   ADL Assistance Needs assistance   IADLs Needs assistance   Falls in the last 6 months 1 to 4   Vocational Retired   Comments AX1 with RW   Restrictions/Precautions   Wells Kaila Bearing Precautions Per Order Yes   RLE Wells Kaila Bearing Per Order PWB  (50%)   Other Precautions O2;Fall Risk;Multiple lines   General   Family/Caregiver Present No   Cognition   Overall Cognitive Status Impaired   Arousal/Participation Arousable   Orientation Level Oriented to time;Oriented to person;Oriented to place   Memory Decreased short term memory   Following Commands Follows one step commands inconsistently   Comments increased processing time   RLE Assessment   RLE Assessment X   Strength RLE   RLE Overall Strength 3-/5   LLE Assessment   LLE Assessment X   Strength LLE   LLE Overall Strength 3+/5   Coordination   Movements are Fluid and Coordinated 1   Sensation WFL   Bed Mobility   Supine to Sit 2  Maximal assistance   Additional items Assist x 1;Verbal cues; Increased time required;LE management   Sit to Supine 2  Maximal assistance   Additional items Assist x 1; Increased time required;LE management;Verbal cues   Transfers   Sit to Stand 3  Moderate assistance   Additional items Assist x 1;Verbal cues; Increased time required   Stand to Sit 3  Moderate assistance   Additional items Assist x 1;Verbal cues; Increased time required   Additional Comments stood x2; ~ 30 seconds each time; pt denied pain durnig stance   Balance   Static Sitting Fair   Dynamic Sitting Fair -   Static Standing Poor +   Endurance Deficit   Endurance Deficit No   Activity Tolerance   Activity Tolerance Patient tolerated treatment well   Assessment   Prognosis Good   Problem List Decreased strength;Decreased endurance; Impaired balance;Decreased mobility; Decreased safety awareness;Orthopedic restrictions   Assessment Pt is 80 y o  female seen for PT evaluation s/p admit to [facility] on 9/28/2018 w/ Closed fracture of right iliac wing ([address] )  PT consulted to assess pt's functional mobility and d/c needs  Order placed for PT eval and tx, w/ PWB R LE order  Comorbidities affecting pt's physical performance at time of assessment include: dementia, aphasia, CVA, arthritis, and CAD  PTA, pt was requiring A for mobility and resident of [facility]  Personal factors affecting pt at time of IE include: ambulating w/ assistive device, inability to ambulate household distances and positive fall history  Please find objective findings from PT assessment regarding body systems outlined above with impairments and limitations including weakness, impaired balance, decreased functional mobility tolerance, decreased safety awareness, fall risk and orthopedic restrictions  The following objective measures performed on IE also reveal limitations: Barthel Index: 10/100  Pt's clinical presentation is currently evolving seen in pt's presentation of dementia  Pt to benefit from continued PT tx to address deficits as defined above and maximize level of functional independent mobility and consistency  From PT/mobility standpoint, recommendation at time of d/c would be STR pending progress in order to facilitate return to PLOF  Goals   Patient Goals to get in chair tomorrow   STG Expiration Date 10/03/18   Short Term Goal #1 Pt will: Perform bed mobility tasks to Min  A Of 1 to improve ease of bed mobility  Perform transfers to [address] to improve ease of transfers  Perform ambulation with Min Ax1 with RW for 10 ft to  decrease burden of care  Increase BLE strength to 4-/5 to improve transfers  Increase dynamic standing balance to Fair- to decrease fall risk      LTG Expiration Date 10/06/18   Long Term Goal #1 Pt will: Perform bed mobility tasks to CGA to improve ease of bed mobility  Perform transfers to CGA  to improve ease of transfers  Perform ambulation with Min Ax1 with RW for 20 ft to  decrease burden of care  Increase BLE strength to 4/5 to improve transfers  Increase dynamic standing balance to Fair to decrease fall risk  Plan   Treatment/Interventions Functional transfer training;LE strengthening/ROM; Therapeutic exercise; Bed mobility;Gait training   PT Frequency 5x/wk   Recommendation   Recommendation Short-term skilled PT   PT - OK to Discharge No   Barthel Index   Feeding 5   Bathing 0   Grooming Score 0   Dressing Score 0   Bladder Score 0   Bowels Score 0   Toilet Use Score 0   Transfers (Bed/Chair) Score 5   Mobility (Level Surface) Score 0   Stairs Score 0   Barthel Index Score 10     Mirlande Dhaliwal, PT

## 2018-09-30 NOTE — PLAN OF CARE
DISCHARGE PLANNING - CARE MANAGEMENT     Discharge to post-acute care or home with appropriate resources Progressing        MUSCULOSKELETAL - ADULT     Maintain or return mobility to safest level of function Progressing     Maintain proper alignment of affected body part Progressing        Nutrition/Hydration-ADULT     Nutrient/Hydration intake appropriate for improving, restoring or maintaining nutritional needs Progressing        Potential for Falls     Patient will remain free of falls Progressing        Prexisting or High Potential for Compromised Skin Integrity     Skin integrity is maintained or improved Progressing        SKIN/TISSUE INTEGRITY - ADULT     Skin integrity remains intact Progressing     Incision(s), wounds(s) or drain site(s) healing without S/S of infection Progressing        Pt pleasant, confused  Resting in bed, harmony c/p or SoB  Took medications without issues  BP just above lopressor parameters, will monitor heart rate and blood pressure closely  Will cont to assess

## 2018-09-30 NOTE — ASSESSMENT & PLAN NOTE
· This is secondary to complete heart block  And history of coronary artery disease with stenting  · Continue aspirin  · I spoke with Dr Nettie Francois as there is some confusion about patient getting antibiotic and NPO after MN order  Patient will need to get battery exchange and per daughter- Kerry Leaks this is scheduled on 10/15/18

## 2018-10-01 LAB
ANION GAP SERPL CALCULATED.3IONS-SCNC: 6 MMOL/L (ref 4–13)
BUN SERPL-MCNC: 14 MG/DL (ref 7–25)
CALCIUM SERPL-MCNC: 9.6 MG/DL (ref 8.6–10.5)
CHLORIDE SERPL-SCNC: 99 MMOL/L (ref 98–107)
CO2 SERPL-SCNC: 33 MMOL/L (ref 21–31)
CREAT SERPL-MCNC: 0.76 MG/DL (ref 0.6–1.2)
ERYTHROCYTE [DISTWIDTH] IN BLOOD BY AUTOMATED COUNT: 15.5 % (ref 11.5–14.5)
GFR SERPL CREATININE-BSD FRML MDRD: 66 ML/MIN/1.73SQ M
GLUCOSE SERPL-MCNC: 93 MG/DL (ref 65–99)
HCT VFR BLD AUTO: 38.1 % (ref 34.8–46.1)
HGB BLD-MCNC: 12.5 G/DL (ref 12–16)
MCH RBC QN AUTO: 27.7 PG (ref 26–34)
MCHC RBC AUTO-ENTMCNC: 32.8 G/DL (ref 31–37)
MCV RBC AUTO: 85 FL (ref 81–99)
PLATELET # BLD AUTO: 181 THOUSANDS/UL (ref 149–390)
PMV BLD AUTO: 8.9 FL (ref 8.6–11.7)
POTASSIUM SERPL-SCNC: 3.9 MMOL/L (ref 3.5–5.5)
RBC # BLD AUTO: 4.52 MILLION/UL (ref 3.9–5.2)
SODIUM SERPL-SCNC: 138 MMOL/L (ref 134–143)
WBC # BLD AUTO: 8.3 THOUSAND/UL (ref 4.8–10.8)

## 2018-10-01 PROCEDURE — 99232 SBSQ HOSP IP/OBS MODERATE 35: CPT | Performed by: INTERNAL MEDICINE

## 2018-10-01 PROCEDURE — 85027 COMPLETE CBC AUTOMATED: CPT | Performed by: NURSE PRACTITIONER

## 2018-10-01 PROCEDURE — 80048 BASIC METABOLIC PNL TOTAL CA: CPT | Performed by: NURSE PRACTITIONER

## 2018-10-01 PROCEDURE — 97530 THERAPEUTIC ACTIVITIES: CPT

## 2018-10-01 PROCEDURE — 97110 THERAPEUTIC EXERCISES: CPT

## 2018-10-01 RX ADMIN — DEXTRAN 70 AND HYPROMELLOSE 2910 1 DROP: 1; 3 SOLUTION/ DROPS OPHTHALMIC at 09:27

## 2018-10-01 RX ADMIN — DOCUSATE SODIUM 100 MG: 100 CAPSULE, LIQUID FILLED ORAL at 18:40

## 2018-10-01 RX ADMIN — VITAMIN D, TAB 1000IU (100/BT) 2000 UNITS: 25 TAB at 09:22

## 2018-10-01 RX ADMIN — LEVOTHYROXINE SODIUM 25 MCG: 25 TABLET ORAL at 05:47

## 2018-10-01 RX ADMIN — HEPARIN SODIUM 5000 UNITS: 5000 INJECTION INTRAVENOUS; SUBCUTANEOUS at 05:47

## 2018-10-01 RX ADMIN — DEXTRAN 70 AND HYPROMELLOSE 2910 1 DROP: 1; 3 SOLUTION/ DROPS OPHTHALMIC at 21:02

## 2018-10-01 RX ADMIN — SENNOSIDES 8.6 MG: 8.6 TABLET, FILM COATED ORAL at 21:02

## 2018-10-01 RX ADMIN — METOPROLOL TARTRATE 12.5 MG: 25 TABLET ORAL at 09:22

## 2018-10-01 RX ADMIN — HEPARIN SODIUM 5000 UNITS: 5000 INJECTION INTRAVENOUS; SUBCUTANEOUS at 21:02

## 2018-10-01 RX ADMIN — PANTOPRAZOLE SODIUM 40 MG: 40 TABLET, DELAYED RELEASE ORAL at 09:22

## 2018-10-01 RX ADMIN — ASPIRIN 325 MG: 325 TABLET ORAL at 09:22

## 2018-10-01 RX ADMIN — METOPROLOL TARTRATE 12.5 MG: 25 TABLET ORAL at 20:52

## 2018-10-01 RX ADMIN — HEPARIN SODIUM 5000 UNITS: 5000 INJECTION INTRAVENOUS; SUBCUTANEOUS at 14:35

## 2018-10-01 RX ADMIN — DOCUSATE SODIUM 100 MG: 100 CAPSULE, LIQUID FILLED ORAL at 09:22

## 2018-10-01 NOTE — PROGRESS NOTES
Progress Note - Mitchel Liu 5/2/1921, 80 y o  female MRN: [id number]    Unit/Bed#: -02 Encounter: [id number]    Primary Care Provider: Zoey Feldman MD   Date and time admitted to hospital: 9/28/2018 12:56 PM        * Closed fracture of right iliac wing [facility])   Assessment & Plan    · Orthopedic surgeon input is appreciated  · There is no surgery indicated at this time  · Will continue with PT and OT with 50% weight-bearing on the right lower extremity  · Continue with pain control  · Case management working on placement         Fall from bed   Assessment & Plan    · Patient does have a history of dementia however patient states that she was getting up to go to the bathroom when she slipped off of the bed and fell onto the floor at home prior to admission  · Fall precautions     Dementia   Assessment & Plan    · Continue supportive care     Hypertension   Assessment & Plan    · BP stable  · Continue metoprolol     Cardiac pacemaker in situ   Assessment & Plan    · This is secondary to complete heart block  And history of coronary artery disease with stenting  · Continue aspirin     Hypothyroidism   Assessment & Plan    · Continue Synthroid       VTE Pharmacologic Prophylaxis: Pharmacologic: Heparin    Patient Centered Rounds: I have performed bedside rounds with nursing staff today  Discussions with Specialists or Other Care Team Provider: yes  Education and Discussions with Family / Patient: yes    Time Spent for Care: 30 minutes  More than 50% of total time spent on counseling and coordination of care as described above  Current Length of Stay: 3 day(s)    Current Patient Status: Inpatient   Certification Statement: The patient will continue to require additional inpatient hospital stay due to Pelvic fracture    Discharge Plan:  Case management working on placement    Code Status: Level 3 - DNAR and DNI    Subjective:   Patient complaining of right hip pain   Unable to get a complete review of systems due to patient's mental status which is at baseline    Objective:     Vitals:   Temp (24hrs), Av 8 °F (36 6 °C), Min:97 °F (36 1 °C), Max:99 1 °F (37 3 °C)    HR:  [65-66] 66  Resp:  [16] 16  BP: (108-152)/(54-72) 124/60  SpO2:  [97 %] 97 %  Body mass index is 19 53 kg/m²  Input and Output Summary (last 24 hours): Intake/Output Summary (Last 24 hours) at 10/01/18 1707  Last data filed at 10/01/18 1445   Gross per 24 hour   Intake              480 ml   Output              200 ml   Net              280 ml       Physical Exam:     Physical Exam   Constitutional: No distress  Frail elderly female   HENT:   Head: Normocephalic and atraumatic  Eyes: Conjunctivae and EOM are normal    Neck: Normal range of motion  Neck supple  Cardiovascular: Normal rate and regular rhythm  Pulmonary/Chest: Effort normal  No respiratory distress  Abdominal: Soft  She exhibits no distension  There is no tenderness  Musculoskeletal: She exhibits no edema  Limited range of motion right hip   Neurological:   Patient is awake but confused, with garbled speech which is her baseline   Skin: Skin is warm and dry  Psychiatric: Her behavior is normal        Additional Data:     Labs:      Results from last 7 days  Lab Units 10/01/18  0618  0511   WBC Thousand/uL 8 30  < > 8 70   HEMOGLOBIN g/dL 12 5  < > 13 5   HEMATOCRIT % 38 1  < > 41 1   PLATELETS Thousands/uL 181  < > 156   NEUTROS PCT %  --   --  76*   LYMPHS PCT %  --   --  12*   MONOS PCT %  --   --  11   EOS PCT %  --   --  0   < > = values in this interval not displayed      Results from last 7 days  Lab Units 10/01/18  0621 18  0454   SODIUM mmol/L 138 137   POTASSIUM mmol/L 3 9 3 7   CHLORIDE mmol/L 99 101   CO2 mmol/L 33* 32*   BUN mg/dL 14 12   CREATININE mg/dL 0 76 0 74   CALCIUM mg/dL 9 6 9 1   ALK PHOS U/L  --  72   ALT U/L  --  8   AST U/L  --  15       Results from last 7 days  Lab Units 18  0511   INR  1 03 * I Have Reviewed All Lab Data Listed Above  * Additional Pertinent Lab Tests Reviewed: [address] Admission  Reviewed    Imaging:  Imaging Reports Reviewed Today Include:   No new imaging    Recent Cultures (last 7 days):           Last 24 Hours Medication List:     Current Facility-Administered Medications:  acetaminophen 650 mg Oral Q6H PRN MARTELL Mendez   aspirin 325 mg Oral Daily MARTELL Mendez   bisacodyl 10 mg Rectal Daily PRN MARTELL Jewell   cholecalciferol 2,000 Units Oral Daily MARTELL Mendez   cloNIDine 0 1 mg Oral Q3H PRN Wilmar Tobar PA-C   dextran 70-hypromellose 1 drop Both Eyes Q12H [address] MARTELL Mendez   docusate sodium 100 mg Oral BID MARTELL Mendez   heparin (porcine) 5,000 Units Subcutaneous Q8H [address] MARTELL Mendez   influenza vaccine 0 5 mL Intramuscular Prior to discharge Marky Joe MD   levothyroxine 25 mcg Oral Early Morning MARTELL Mendez   metoprolol tartrate 12 5 mg Oral Q12H [address] MARTELL Mendez   ondansetron 4 mg Intravenous Q6H PRN MARTELL Mendez   pantoprazole 40 mg Oral Daily MARTELL Mendez   senna 1 tablet Oral HS MARTELL Mendez        Today, Patient Was Seen By: Marky Joe MD    ** Please Note: Dictation voice to text software may have been used in the creation of this document   **

## 2018-10-01 NOTE — PLAN OF CARE
DISCHARGE PLANNING - CARE MANAGEMENT     Discharge to post-acute care or home with appropriate resources Progressing        MUSCULOSKELETAL - ADULT     Maintain or return mobility to safest level of function Progressing     Maintain proper alignment of affected body part Progressing        Nutrition/Hydration-ADULT     Nutrient/Hydration intake appropriate for improving, restoring or maintaining nutritional needs Progressing        Potential for Falls     Patient will remain free of falls Progressing        Prexisting or High Potential for Compromised Skin Integrity     Skin integrity is maintained or improved Progressing        SKIN/TISSUE INTEGRITY - ADULT     Skin integrity remains intact Progressing     Incision(s), wounds(s) or drain site(s) healing without S/S of infection Progressing

## 2018-10-01 NOTE — SOCIAL WORK
Chart reviewed by case management, pt has been accepted to [facility], , pt was not accepted to [facility], I called Gail Juarez @16:25  At [phone] I made her aware that the [facility] and [facility], [facility] is able to accept the pt, she is aware that the pt will be financial responsible for the transport, rn is weaning the pt off oxygen I have a 12$5pm w/c van set up, but if the pt needs to have oxygen reapplied then the mode of transport will need to be changed, d c plan was discussed at d/c planning meeting

## 2018-10-01 NOTE — ASSESSMENT & PLAN NOTE
· Patient does have a history of dementia however patient states that she was getting up to go to the bathroom when she slipped off of the bed and fell onto the floor at home prior to admission  · Fall precautions

## 2018-10-01 NOTE — PHYSICAL THERAPY NOTE
10/01/18 1421   Pain Assessment   Pain Assessment Quintana-Baker FACES   Quintana-Baker FACES Pain Rating 4   Pain Type Acute pain   Pain Location Hip   Pain Orientation Right   Pain Descriptors Aching   Pain Frequency Constant/continuous   Pain Onset Ongoing   Clinical Progression Not changed   Patient's Stated Pain Goal No pain   Hospital Pain Intervention(s) Medication (See MAR)   Restrictions/Precautions   Weight Bearing Precautions Per Order Yes   RLE Weight Bearing Per Order PWB   Other Precautions Fall Risk   Cognition   Overall Cognitive Status Impaired   Orientation Level Disoriented to person   Memory Decreased short term memory   Following Commands Follows one step commands inconsistently   Subjective   Subjective Pt agreeable to particiapte in PT RX   Bed Mobility   Supine to Sit 2  Maximal assistance   Additional items Assist x 1   Sit to Supine 2  Maximal assistance   Additional items Assist x 1   Transfers   Sit to Stand 3  Moderate assistance   Additional items Assist x 1;Verbal cues; Increased time required   Stand to Sit 3  Moderate assistance   Additional items Verbal cues; Assist x 1; Increased time required   Balance   Static Sitting Fair   Dynamic Sitting Fair -   Static Standing Poor +   Endurance Deficit   Endurance Deficit No   Activity Tolerance   Activity Tolerance Patient tolerated treatment well   Exercises   Glute Sets 20 reps; Sitting   Hip Flexion 20 reps; Sitting;Bilateral;AROM   Hip Adduction Sitting;20 reps;Bilateral   Knee AROM Long Arc Quad Sitting;20 reps;AROM; Bilateral   Ankle Pumps Sitting;20 reps;AROM; Bilateral   Assessment   Prognosis Good   Problem List Decreased strength;Decreased endurance; Impaired balance;Decreased mobility; Decreased safety awareness;Pain;Orthopedic restrictions   Assessment Pt is a 81 yo female to be treatmeted with PT for R illiac wing FX  Pt recieved supine in bed in no apparent distress  Pt transferred supine to sit with ModA   Pt stood at beside with RW and minAx2 for 30 secx2  Pt was unable to take a step  Pt returned to sitting on EOB and performed BLE ther ex x20 reps each with verbal cues for proper technique  Pt returned to supine in bed with modAx1  Pt did report pain during ther ex but was unable to rate with a number  Pt is supine in bed with needs in reach  Goals   Patient Goals to walk   Plan   Treatment/Interventions Functional transfer training;LE strengthening/ROM; Therapeutic exercise; Endurance training;Bed mobility;Gait training   Progress Slow progress, decreased activity tolerance   PT Frequency 5x/wk   Recommendation   Recommendation Short-term skilled PT   PT - OK to Discharge Shirley Bello Human, PTA

## 2018-10-01 NOTE — ASSESSMENT & PLAN NOTE
· Orthopedic surgeon input is appreciated  · There is no surgery indicated at this time  · Will continue with PT and OT with 50% weight-bearing on the right lower extremity    · Continue with pain control  · Case management working on placement

## 2018-10-01 NOTE — PLAN OF CARE
Problem: PHYSICAL THERAPY ADULT  Goal: Performs mobility at highest level of function for planned discharge setting  See evaluation for individualized goals  Treatment/Interventions: Functional transfer training, LE strengthening/ROM, Therapeutic exercise, Bed mobility, Gait training          See flowsheet documentation for full assessment, interventions and recommendations  Outcome: Progressing  Prognosis: Good  Problem List: Decreased strength, Decreased endurance, Impaired balance, Decreased mobility, Decreased safety awareness, Pain, Orthopedic restrictions  Assessment: Pt is a 81 yo female to be treatmeted with PT for R illiac wing FX  Pt recieved supine in bed in no apparent distress  Pt transferred supine to sit with ModA  Pt stood at beside with RW and minAx2 for 30 secx2  Pt was unable to take a step  Pt returned to sitting on EOB and performed BLE ther ex x20 reps each with verbal cues for proper technique  Pt returned to supine in bed with modAx1  Pt did report pain during ther ex but was unable to rate with a number  Pt is supine in bed with needs in reach  Recommendation: Short-term skilled PT     PT - OK to Discharge: No  Faina Bennett, PTA    See flowsheet documentation for full assessment

## 2018-10-02 VITALS
TEMPERATURE: 97.8 F | HEIGHT: 60 IN | RESPIRATION RATE: 16 BRPM | HEART RATE: 90 BPM | OXYGEN SATURATION: 92 % | WEIGHT: 100 LBS | SYSTOLIC BLOOD PRESSURE: 156 MMHG | DIASTOLIC BLOOD PRESSURE: 68 MMHG | BODY MASS INDEX: 19.63 KG/M2

## 2018-10-02 PROCEDURE — 97530 THERAPEUTIC ACTIVITIES: CPT

## 2018-10-02 PROCEDURE — G8988 SELF CARE GOAL STATUS: HCPCS

## 2018-10-02 PROCEDURE — 97167 OT EVAL HIGH COMPLEX 60 MIN: CPT

## 2018-10-02 PROCEDURE — 99239 HOSP IP/OBS DSCHRG MGMT >30: CPT | Performed by: INTERNAL MEDICINE

## 2018-10-02 PROCEDURE — 97110 THERAPEUTIC EXERCISES: CPT

## 2018-10-02 PROCEDURE — G8987 SELF CARE CURRENT STATUS: HCPCS

## 2018-10-02 RX ADMIN — DOCUSATE SODIUM 100 MG: 100 CAPSULE, LIQUID FILLED ORAL at 08:06

## 2018-10-02 RX ADMIN — METOPROLOL TARTRATE 12.5 MG: 25 TABLET ORAL at 08:07

## 2018-10-02 RX ADMIN — VITAMIN D, TAB 1000IU (100/BT) 2000 UNITS: 25 TAB at 08:06

## 2018-10-02 RX ADMIN — HEPARIN SODIUM 5000 UNITS: 5000 INJECTION INTRAVENOUS; SUBCUTANEOUS at 05:19

## 2018-10-02 RX ADMIN — LEVOTHYROXINE SODIUM 25 MCG: 25 TABLET ORAL at 05:19

## 2018-10-02 RX ADMIN — DEXTRAN 70 AND HYPROMELLOSE 2910 1 DROP: 1; 3 SOLUTION/ DROPS OPHTHALMIC at 08:06

## 2018-10-02 RX ADMIN — ASPIRIN 325 MG: 325 TABLET ORAL at 08:07

## 2018-10-02 RX ADMIN — PANTOPRAZOLE SODIUM 40 MG: 40 TABLET, DELAYED RELEASE ORAL at 08:06

## 2018-10-02 NOTE — ASSESSMENT & PLAN NOTE
· Orthopedic surgeon input is appreciated  · There is no surgery indicated at this time  · Will continue with PT and OT with 50% weight-bearing on the right lower extremity    · Continue with pain control  · Patient will be discharged to SNF for further rehab

## 2018-10-02 NOTE — PLAN OF CARE
Problem: DISCHARGE PLANNING - CARE MANAGEMENT  Goal: Discharge to post-acute care or home with appropriate resources  INTERVENTIONS:  - Conduct assessment to determine patient/family and health care team treatment goals, and need for post-acute services based on payer coverage, community resources, and patient preferences, and barriers to discharge  - Address psychosocial, clinical, and financial barriers to discharge as identified in assessment in conjunction with the patient/family and health care team  - Arrange appropriate level of post-acute services according to patient's   needs and preference and payer coverage in collaboration with the physician and health care team  - Communicate with and update the patient/family, physician, and health care team regarding progress on the discharge plan  - Arrange appropriate transportation to post-acute venues  D/c to snf   Outcome: Completed Date Met: 10/02/18

## 2018-10-02 NOTE — PHYSICAL THERAPY NOTE
10/02/18 0844   Pain Assessment   Pain Assessment 0-10   Quintana-Baker FACES Pain Rating 2   Pain Type Acute pain   Pain Location Hip   Pain Orientation Right   Pain Descriptors Aching   Pain Frequency Constant/continuous   Pain Onset Ongoing   Clinical Progression Not changed   Hospital Pain Intervention(s) Medication (See MAR)   Multiple Pain Sites No   Restrictions/Precautions   RLE Weight Bearing Per Order PWB  (50% WB)   Other Precautions Fall Risk;Hard of hearing;Aspiration   General   Family/Caregiver Present No   Cognition   Overall Cognitive Status Impaired   Arousal/Participation Alert; Cooperative   Attention Attends with cues to redirect   Orientation Level Oriented to person;Oriented to place;Oriented to situation   Memory Decreased short term memory;Decreased recall of precautions   Following Commands Follows one step commands with increased time or repetition   Subjective   Subjective pt agreed to PT treatment, she reports she has "a little pain in R hip"   Bed Mobility   Rolling L 3  Moderate assistance   Additional items Assist x 1; Increased time required;Verbal cues   Supine to Sit 4  Minimal assistance   Additional items Assist x 2; Increased time required;Verbal cues   Sit to Supine 4  Minimal assistance   Additional items Assist x 2; Increased time required;Verbal cues;LE management   Transfers   Sit to Stand 4  Minimal assistance   Additional items Assist x 2; Increased time required;Verbal cues   Stand to Sit 4  Minimal assistance   Additional items Assist x 2; Increased time required;Verbal cues   Additional Comments stood with RW min assist x 2 for 5 mins,vcs   Ambulation/Elevation   Gait pattern (unable to take steps)   Balance   Static Sitting Fair +   Dynamic Sitting Fair   Static Standing Fair -   Endurance Deficit   Endurance Deficit No   Activity Tolerance   Activity Tolerance Patient tolerated treatment well   Exercises   Glute Sets Sitting;20 reps   Hip Flexion Sitting;20 reps;Bilateral   Hip Adduction Sitting;20 reps  (pillow squeezes)   Knee AROM Long Arc Quad Sitting;20 reps;Bilateral   Ankle Pumps Sitting;20 reps;AAROM; Bilateral   Assessment   Prognosis Good   Problem List Decreased strength;Decreased endurance; Impaired balance;Decreased mobility; Decreased safety awareness;Orthopedic restrictions;Pain   Assessment Pt in bed & agreed to PT treatment  She transferred to sit with min assist x 1 & sat at EOB to perform B LE ther ex as noted  She transferred to stand with min assist x 2 & stood with RW min assist x 2 & vcs for 5 mins  She was unable to progress LEs to take steps  She reported an increase in pain with standing but did not rate  She returned to supine min assist x 2 & made comfortable in bed  Bed alarm in place, all needs in reach  She would benefit from cont'd PT/STR to increase strength, endurance & safe functional mobility  Goals   Patient Goals to walk   Treatment Day 2   Plan   Treatment/Interventions Functional transfer training;LE strengthening/ROM; Therapeutic exercise; Endurance training;Patient/family training;Bed mobility;Gait training   Progress Slow progress, decreased activity tolerance   PT Frequency 5x/wk   Recommendation   Recommendation Short-term skilled PT   PT - OK to Discharge Yes  (if to STR)   Margo Barros, PTA

## 2018-10-02 NOTE — PLAN OF CARE
Problem: PHYSICAL THERAPY ADULT  Goal: Performs mobility at highest level of function for planned discharge setting  See evaluation for individualized goals  Treatment/Interventions: Functional transfer training, LE strengthening/ROM, Therapeutic exercise, Bed mobility, Gait training          See flowsheet documentation for full assessment, interventions and recommendations  Outcome: Progressing  Prognosis: Good  Problem List: Decreased strength, Decreased endurance, Impaired balance, Decreased mobility, Decreased safety awareness, Orthopedic restrictions, Pain  Assessment: Pt in bed & agreed to PT treatment  She transferred to sit with min assist x 1 & sat at EOB to perform B LE ther ex as noted  She transferred to stand with min assist x 2 & stood with RW min assist x 2 & vcs for 5 mins  She was unable to progress LEs to take steps  She reported an increase in pain with standing but did not rate  She returned to supine min assist x 2 & made comfortable in bed  Bed alarm in place, all needs in reach  She would benefit from cont'd PT/STR to increase strength, endurance & safe functional mobility  Recommendation: Short-term skilled PT     PT - OK to Discharge: Yes (if to STR)    See flowsheet documentation for full assessment     Homar Ramirez, PTA

## 2018-10-02 NOTE — DISCHARGE SUMMARY
Discharge- Abigail Flood 5/2/1921, 80 y o  female MRN: [id number]    Unit/Bed#: -02 Encounter: [id number]    Primary Care Provider: Cornelio Ross MD   Date and time admitted to hospital: 9/28/2018 12:56 PM        * Closed fracture of right iliac wing [facility])   Assessment & Plan    · Orthopedic surgeon input is appreciated  · There is no surgery indicated at this time  · Will continue with PT and OT with 50% weight-bearing on the right lower extremity  · Continue with pain control  · Patient will be discharged to SNF for further rehab         Hypertension   Assessment & Plan    · BP stable  · Continue metoprolol     Hypothyroidism   Assessment & Plan    · Continue Synthroid     Mixed hyperlipidemia   Assessment & Plan    · Stable       Discharging Physician / Practitioner: Rochelle Roberto MD  PCP: Cornelio Ross MD  Admission Date:   Admission Orders     Ordered        09/28/18 1518  Inpatient Admission  Once             Discharge Date: 10/02/18    Resolved Problems  Date Reviewed: 9/30/2018    None          Consultations During Hospital Stay:  · Orthopedics    Procedures Performed:   · None    Significant Findings / Test Results:   · None    Incidental Findings:   · None     Test Results Pending at Discharge (will require follow up): · None     Outpatient Tests Requested:  · None    Complications:  None    Reason for Admission:   [facility] Course: Abigail Flood is a 80 y o  female patient who originally presented to the hospital on 9/28/2018 due to right pelvic fracture  Patient was admitted with fracture of right iliac wing  Patient was having difficulty ambulating and admitted for further evaluation  Patient was seen by Orthopedics who recommended no surgical intervention  Recommendation was for 50% weight-bearing on right lower extremity  Labs remained stable  Case management had arranged for rehab at [facility]      Please see above list of diagnoses and related plan for additional information  Condition at Discharge: stable     Discharge Day Visit / Exam:     Subjective:  Patient with expressive aphasia unable to get a complete review of systems    Vitals: Blood Pressure: 156/68 (10/02/18 0730)  Pulse: 90 (10/02/18 0730)  Temperature: 97 8 °F (36 6 °C) (10/02/18 0730)  Temp Source: Tympanic (10/02/18 0730)  Respirations: 16 (10/02/18 0730)  Height: 5' (152 4 cm) (10/01/18 1332)  Weight - Scale: 45 4 kg (100 lb) (09/28/18 1300)  SpO2: 92 % (10/02/18 0730)     Exam:   Physical Exam   Constitutional: No distress  Frail elderly female   HENT:   Head: Normocephalic and atraumatic  Eyes: Conjunctivae and EOM are normal    Neck: Normal range of motion  Neck supple  Cardiovascular: Normal rate and regular rhythm  Pulmonary/Chest: Effort normal  No respiratory distress  Abdominal: Soft  She exhibits no distension  There is no tenderness  Musculoskeletal: She exhibits no edema  Limited range of motion right lower extremity   Neurological: She is alert  Expressive aphasia due to history of stroke   Skin: Skin is warm and dry  Psychiatric: Her behavior is normal        Discussion with Family:   Spoke to daughter Milo De Jesus over the phone regarding discharge plan and    Discharge instructions/Information to patient and family:   See after visit summary for information provided to patient and family  Provisions for Follow-Up Care:  See after visit summary for information related to follow-up care and any pertinent home health orders  Disposition:     Other [facility] [address] SNF:   · Not Applicable to this Patient - Not Applicable to this Patient    Planned Readmission:   No     Discharge Statement:  I spent 35 minutes discharging the patient  This time was spent on the day of discharge  I had direct contact with the patient on the day of discharge   Greater than 50% of the total time was spent examining patient, answering all patient questions, arranging and discussing plan of care with patient as well as directly providing post-discharge instructions  Additional time then spent on discharge activities  Discharge Medications:  See after visit summary for reconciled discharge medications provided to patient and family        ** Please Note: This note has been constructed using a voice recognition system **

## 2018-10-02 NOTE — DISCHARGE INSTR - ACTIVITY
RLE is 50% weight bearing  Pt is assist x2 with walker to stand at side of bed or transfer to chair

## 2018-10-02 NOTE — PLAN OF CARE
DISCHARGE PLANNING - CARE MANAGEMENT     Discharge to post-acute care or home with appropriate resources Progressing        MUSCULOSKELETAL - ADULT     Maintain or return mobility to safest level of function Progressing     Maintain proper alignment of affected body part Progressing        Nutrition/Hydration-ADULT     Nutrient/Hydration intake appropriate for improving, restoring or maintaining nutritional needs Progressing        Potential for Falls     Patient will remain free of falls Progressing        Prexisting or High Potential for Compromised Skin Integrity     Skin integrity is maintained or improved Progressing        SKIN/TISSUE INTEGRITY - ADULT     Skin integrity remains intact Progressing     Incision(s), wounds(s) or drain site(s) healing without S/S of infection Progressing        Pt resting in bed, in no distress  Took all medications without issue  Will continue to assess

## 2018-10-02 NOTE — SOCIAL WORK
Chart reviewed by case management , d/c order written, pt has been accepted to [facility], I did call obdulio again today to make her aware of the 12:45pm w/c Stephie Hernandez, she was made aware again that there is a fee for the transport, rn and pt dept did agree with the mode of transport, Radha Cunningham was made aware of the /d c today, rn was given the phone# for report and the fax# tos end d/c instructions, d/c plan was discussed at the d/c planning meeting, pt and family in agreement with the d/c plan to [facility],j[facility] was made aware of the d/c plan

## 2018-10-02 NOTE — PLAN OF CARE
Problem: OCCUPATIONAL THERAPY ADULT  Goal: Performs self-care activities at highest level of function for planned discharge setting  See evaluation for individualized goals  Treatment Interventions: ADL retraining, Functional transfer training, UE strengthening/ROM, Endurance training, Cognitive reorientation, Patient/family training          See flowsheet documentation for full assessment, interventions and recommendations  Outcome: Progressing  Limitation: Decreased self-care trans, Decreased Safe judgement during ADL, Decreased ADL status, Decreased UE strength, Decreased endurance  Prognosis: Good  Assessment: Pt is a 80 y o  female seen for OT evaluation s/p admit to [address] on 9/28/2018 w/ Closed fracture of right iliac wing ([address] )  Comorbidities affecting pt's functional performance at time of assessment include: HTN, dementia and Pacemaker, GERD, Fall from Bed, Hypothyroidism, hx CVA  Personal factors affecting pt at time of IE include:difficulty performing ADLS, limited insight into deficits and PWB Status  Prior to admission, pt was participating in UB ADL's with assistance /set-up and D LB, ambulatory w/ RW with A, D IADL's  Upon evaluation: Pt requires generally a mod to max level of assistance with all self care and functional transfers, unable to ambulate presently r/t  the following deficits impacting occupational performance: weakness, decreased strength, decreased balance, decreased tolerance, decreased safety awareness and orthopedic restrictions  Pt to benefit from continued skilled OT tx while in the hospital to address deficits as defined above and maximize level of functional independence w ADL's and functional mobility  Occupational Performance areas to address include: grooming, bathing/shower, dressing and functional mobility  From OT standpoint, recommendation at time of d/c would be STR prior to return to [facility]         OT Discharge Recommendation: Short Term Rehab  OT - OK to Discharge: No Nathan Carson, OT

## 2018-10-02 NOTE — OCCUPATIONAL THERAPY NOTE
Occupational Therapy Evaluation      Abigail Tan    10/2/2018    Patient Active Problem List   Diagnosis    Mixed hyperlipidemia    Hypothyroidism    [facility]    Cardiac pacemaker in situ    CVA (cerebral vascular accident) ([facility]    Cardiac murmur    Dyslipidemia    Hypertension    Dementia    Pacemaker failure    Closed fracture of right iliac wing ([facility]    Fall from bed    Gastroesophageal reflux disease without esophagitis    Leukemoid reaction       Past Medical History:   Diagnosis Date    Aphasia as late effect of cerebrovascular accident     Arthritis     Atrioventricular block, complete ([address] )     CAD (coronary artery disease)     remote stenting    Cardiac pacemaker in situ 12/09/2009    Cerebral infarct ([facility] )     due to unspecified occlusion and stenosis of unspecified cerebral artery    Disease of thyroid gland     Dysphagia     Essential (primary) hypertension     GERD (gastroesophageal reflux disease)     PUD    Gout     History of echocardiogram 04/27/2015    EF 55%, mild MR, mild TR      Mixed hyperlipidemia     Seizures (HCC)     Shoulder fracture, left     Stroke (HCC)     dysphagia, aphasia    Subarachnoid hemorrhage (HCC)     Wrist fracture, closed, left, with routine healing, subsequent encounter 2003       Past Surgical History:   Procedure Laterality Date   [facility] CARDIAC PACEMAKER PLACEMENT  12/09/2009    [employer] Edvin PERKINS Model 587815 Serial # [id number]    CORONARY ANGIOPLASTY WITH STENT PLACEMENT      unsure of date    EYE SURGERY      FRACTURE SURGERY      SKIN BIOPSY        10/02/18 0904   Note Type   Note type Eval/Treat   Restrictions/Precautions   Weight Bearing Precautions Per Order Yes   RLE Weight Bearing Per Order PWB   Other Precautions Fall Risk;Hard of hearing;Aspiration   Pain Assessment   Pain Assessment No/denies pain   Pain Score No Pain   Home Living   Type of Home Assisted living  [facility])   Home Layout Access; Ramped entrance   LIFT12b bars in shower; [address]; Wheelchair-manual   Prior Function   Level of [place] Needs assistance with IADLs; Needs assistance with ADLs and functional mobility   Lives With Facility staff   Receives Help From Personal care attendant   ADL Assistance Needs assistance   IADLs Needs assistance   Falls in the last 6 months 1 to 4   Vocational Retired   Comments RW to ambulate w/ A X1   Psychosocial   Psychosocial (WDL) WDL   Subjective   Subjective Denies pain at rest   ADL   Eating Assistance 5  Supervision/Setup   Grooming Assistance 4  Minimal Assistance   UB Bathing Assistance 3  Moderate [address]  [address] 2  Maximal [address] 1  Total [address]  Unable to assess   Bed Mobility   Rolling R 3  Moderate assistance   Additional items Assist x 1;Verbal cues; Increased time required   Rolling L 3  Moderate assistance   Additional items Assist x 1;Verbal cues; Increased time required   Supine to Sit 3  Moderate assistance   Additional items Verbal cues; Increased time required;LE management;Assist x 1   Sit to Supine 3  Moderate assistance   Additional items Assist x 2;Verbal cues;LE management   Transfers   Sit to Stand 4  Minimal assistance   Additional items Assist x 2;Verbal cues; Increased time required   Stand to Sit 4  Minimal assistance   Additional items Assist x 2; Increased time required;Verbal cues   Toilet transfer Unable to assess   Additional Comments stood X 5', VC's, RW, Min A X2   Functional Mobility   Functional Mobility (unable to take steps presently)   Balance   Static Sitting Fair +   Dynamic Sitting Fair   Static Standing Fair -   Activity Tolerance   Activity Tolerance Patient tolerated treatment well   RUE Assessment   RUE Assessment (Fair strength, shld decreased grossly 1/2)   LUE Assessment   LUE Assessment (Fair strength, ROM WFL's)   Hand Function   Gross Motor Coordination Functional   Fine Motor Coordination Functional   Cognition   Overall Cognitive Status Impaired  (pt [place], need to attend to same)   Arousal/Participation Alert; Cooperative   Attention Attends with cues to redirect  [facility], need to make sure pt understands/hears)   Orientation Level Oriented to person;Oriented to place;Oriented to situation   Memory Decreased short term memory;Decreased recall of precautions  (VC's for PWB needed)   Following Commands Follows one step commands with increased time or repetition   Assessment   Limitation Decreased self-care trans;Decreased Safe judgement during ADL;Decreased ADL status; Decreased UE strength;Decreased endurance   Prognosis Good   Assessment Pt is a 80 y o  female seen for OT evaluation s/p admit to [facility] on 9/28/2018 w/ Closed fracture of right iliac wing ([address] )  Comorbidities affecting pt's functional performance at time of assessment include: HTN, dementia and Pacemaker, GERD, Fall from Bed, Hypothyroidism, hx CVA  Personal factors affecting pt at time of IE include:difficulty performing ADLS, limited insight into deficits and PWB Status  Prior to admission, pt was participating in UB ADL's with assistance /set-up and D LB, ambulatory w/ RW with A, D IADL's  Upon evaluation: Pt requires generally a mod to max level of assistance with all self care and functional transfers, unable to ambulate presently r/t  the following deficits impacting occupational performance: weakness, decreased strength, decreased balance, decreased tolerance, decreased safety awareness and orthopedic restrictions  Pt to benefit from continued skilled OT tx while in the hospital to address deficits as defined above and maximize level of functional independence w ADL's and functional mobility   Occupational Performance areas to address include: grooming, bathing/shower, dressing and functional mobility  From OT standpoint, recommendation at time of d/c would be STR prior to return to [facility]  Goals   Patient Goals to walk   STG Time Frame 3-5   Short Term Goal #1 increase light UB self care ADL's to min to mod A following set up/with VC's as needed   Short Term Goal #2 increase bed mobility to min A X 1 to prepare for self care ADL's   Short Term Goal  increase static standing tolerance to 7'10', maintaining PWB status, with VC's PRN and Min A X1 and RW   LTG Time Frame 7-10   Long Term Goal #1 increase bed mobility to MI   Long Term Goal #2 increase b/l UE strength X 1/2 grade, for increased ability to safely assist with functional status and maintain PWB status of RLE   ADL Goals   Pt Will Perform Grooming With setup   Pt Will Perform Bathing (UB with set up and SBA (with exception of back))   Pt Will Perform UE Dressing With mod assist   Plan   Treatment Interventions ADL retraining;Functional transfer training;UE strengthening/ROM; Endurance training;Cognitive reorientation;Patient/family training   Goal Expiration Date 10/12/18   Treatment Day 0   OT Frequency 3-5x/wk   Recommendation   OT Discharge Recommendation Short Term Rehab   OT - OK to Discharge No   Barthel Index   Feeding 5   Bathing 0   Grooming Score 0   Dressing Score 0   Bladder Score 0   Bowels Score 0   Toilet Use Score 0   Transfers (Bed/Chair) Score 5   Mobility (Level Surface) Score 0   Stairs Score 0   Barthel Index Score 10   Jessica Enriquez, OT

## 2018-10-22 ENCOUNTER — ANESTHESIA EVENT (OUTPATIENT)
Dept: PERIOP | Facility: HOSPITAL | Age: 83
End: 2018-10-22
Payer: MEDICARE

## 2018-10-22 NOTE — ANESTHESIA PREPROCEDURE EVALUATION
Review of Systems/Medical History  Patient summary reviewed  Chart reviewed      Cardiovascular  EKG reviewed, Hyperlipidemia, Hypertension , CAD , Dysrhythmias ,   Comment: EKG Atrial paced  Inc AV conduction, RVH, inc RBBB, STTchanges   ECHO 55% EF,  Pulmonary       GI/Hepatic    GERD ,             Endo/Other  History of thyroid disease ,      GYN       Hematology   Musculoskeletal    Arthritis     Neurology  Seizures ,  CVA ,    Psychology         Lab Results   Component Value Date    WBC 8 30 10/01/2018    HGB 12 5 10/01/2018    HCT 38 1 10/01/2018    MCV 85 10/01/2018     10/01/2018     Lab Results   Component Value Date    CALCIUM 9 6 10/01/2018     10/01/2018    K 3 9 10/01/2018    CO2 33 (H) 10/01/2018    CL 99 10/01/2018    BUN 14 10/01/2018    CREATININE 0 76 10/01/2018     Lab Results   Component Value Date    INR 1 03 09/29/2018    INR 0 95 08/22/2018    PROTIME 12 0 09/29/2018    PROTIME 12 6 08/22/2018     Lab Results   Component Value Date    PTT 27 08/22/2018       Physical Exam    Airway    Mallampati score: III  TM Distance: >3 FB  Neck ROM: full     Dental   lower dentures and upper dentures,     Cardiovascular  Cardiovascular exam normal    Pulmonary  Pulmonary exam normal     Other Findings        Anesthesia Plan  ASA Score- 3     Anesthesia Type- IV sedation with anesthesia with ASA Monitors  Additional Monitors:   Airway Plan:     Comment: Plan discussed with Daughter over phone is MAC with GA as backup        Plan Factors-    Induction- intravenous  Postoperative Plan-     Informed Consent- Anesthetic plan and risks discussed with patient and daughter  I personally reviewed this patient with the CRNA  Discussed and agreed on the Anesthesia Plan with the CRNA  Andrei Call

## 2018-10-23 ENCOUNTER — HOSPITAL ENCOUNTER (OUTPATIENT)
Facility: HOSPITAL | Age: 83
Setting detail: OUTPATIENT SURGERY
Discharge: NURSING, REHAB OR LONG-TERM CARE | End: 2018-10-23
Attending: INTERNAL MEDICINE | Admitting: INTERNAL MEDICINE
Payer: MEDICARE

## 2018-10-23 ENCOUNTER — ANESTHESIA (OUTPATIENT)
Dept: PERIOP | Facility: HOSPITAL | Age: 83
End: 2018-10-23
Payer: MEDICARE

## 2018-10-23 VITALS
TEMPERATURE: 96.8 F | OXYGEN SATURATION: 97 % | SYSTOLIC BLOOD PRESSURE: 148 MMHG | HEART RATE: 60 BPM | DIASTOLIC BLOOD PRESSURE: 72 MMHG | BODY MASS INDEX: 19.63 KG/M2 | WEIGHT: 100 LBS | HEIGHT: 60 IN | RESPIRATION RATE: 18 BRPM

## 2018-10-23 PROCEDURE — 33228 REMV&REPLC PM GEN DUAL LEAD: CPT | Performed by: INTERNAL MEDICINE

## 2018-10-23 PROCEDURE — C1785 PMKR, DUAL, RATE-RESP: HCPCS | Performed by: INTERNAL MEDICINE

## 2018-10-23 DEVICE — IMPLANTABLE DEVICE
Type: IMPLANTABLE DEVICE | Site: CHEST | Status: FUNCTIONAL
Brand: EDORA 8 DR-T

## 2018-10-23 RX ORDER — SODIUM CHLORIDE, SODIUM LACTATE, POTASSIUM CHLORIDE, CALCIUM CHLORIDE 600; 310; 30; 20 MG/100ML; MG/100ML; MG/100ML; MG/100ML
125 INJECTION, SOLUTION INTRAVENOUS CONTINUOUS
Status: DISCONTINUED | OUTPATIENT
Start: 2018-10-23 | End: 2018-10-24 | Stop reason: HOSPADM

## 2018-10-23 RX ORDER — PROPOFOL 10 MG/ML
INJECTION, EMULSION INTRAVENOUS CONTINUOUS PRN
Status: DISCONTINUED | OUTPATIENT
Start: 2018-10-23 | End: 2018-10-23 | Stop reason: SURG

## 2018-10-23 RX ORDER — SODIUM CHLORIDE 9 MG/ML
INJECTION, SOLUTION INTRAVENOUS AS NEEDED
Status: DISCONTINUED | OUTPATIENT
Start: 2018-10-23 | End: 2018-10-23 | Stop reason: HOSPADM

## 2018-10-23 RX ORDER — FENTANYL CITRATE 50 UG/ML
INJECTION, SOLUTION INTRAMUSCULAR; INTRAVENOUS AS NEEDED
Status: DISCONTINUED | OUTPATIENT
Start: 2018-10-23 | End: 2018-10-23 | Stop reason: SURG

## 2018-10-23 RX ORDER — PROPOFOL 10 MG/ML
INJECTION, EMULSION INTRAVENOUS AS NEEDED
Status: DISCONTINUED | OUTPATIENT
Start: 2018-10-23 | End: 2018-10-23 | Stop reason: SURG

## 2018-10-23 RX ORDER — LIDOCAINE HYDROCHLORIDE 10 MG/ML
INJECTION, SOLUTION INFILTRATION; PERINEURAL AS NEEDED
Status: DISCONTINUED | OUTPATIENT
Start: 2018-10-23 | End: 2018-10-23 | Stop reason: HOSPADM

## 2018-10-23 RX ADMIN — FENTANYL CITRATE 12.5 MCG: 50 INJECTION INTRAMUSCULAR; INTRAVENOUS at 08:03

## 2018-10-23 RX ADMIN — PROPOFOL 20 MCG/KG/MIN: 10 INJECTION, EMULSION INTRAVENOUS at 08:05

## 2018-10-23 RX ADMIN — PROPOFOL 10 MG: 10 INJECTION, EMULSION INTRAVENOUS at 08:03

## 2018-10-23 RX ADMIN — SODIUM CHLORIDE, POTASSIUM CHLORIDE, SODIUM LACTATE AND CALCIUM CHLORIDE 125 ML/HR: 600; 310; 30; 20 INJECTION, SOLUTION INTRAVENOUS at 07:36

## 2018-10-23 RX ADMIN — CEFAZOLIN SODIUM 1000 MG: 1 SOLUTION INTRAVENOUS at 08:05

## 2018-10-23 NOTE — OP NOTE
OPERATIVE REPORT  PATIENT NAME: Марина Kendrick    :  1921  MRN: [id number]  Pt Location: [address] [place] [address] ROOM 02    SURGERY DATE: 10/23/2018     Procedure:  Dual chamber pacemaker explant and new pacemaker implant  Indication:  Device battery end of life  Intermittent complete heart block  Site:  Left subclavian  Complications:  None  Estimated blood loss:  Less than 10 cc: Anesthesia:  Local with sedation  Device Information:  Biotronik Edora 8 DR-T Serial [id number]  Chronic atrial lead [employer]ron[employer] Selox JT 53 Serial [id number] P wave 3 8 mV  Capture threshold 1 0 Impedence 604 Ohms  Chronic Ventricular lead [employer] Selox Serial # [id number] R wave 8 1 mV  Threshold 1 1 V  Impedence 604 ohms  Leads originally placed 09  Explanted device is also Biotronik        Procedure details: The patient was prepped and draped in the usual sterile manner after consent was obtained and they were probably it identified  Incision was made around the prior device implantation scar  Cutdown was then made with sharp and blunt dissection to the device pocket  The pocket was opened sharply  The prior device was removed from the pocket and detached from the leads  The pocket was copiously irrigated with sterile saline  There was good hemostasis  The new device was brought to the table, attached to the leads,  placed into the pocket,  and secured to the underlying tissue entry site with 0 silk through the device header block  2-0 Vicryl was then used for interrupted closure of the pocket  2-0 Vicryl was then used for a running closure of the subcutaneous fascia and fat  4-0 Vicryl was then used for interrupted subcuticular closure of the skin  Dermabond was placed on the wound and the patient was returned to the recovery room in good condition

## 2018-10-23 NOTE — DISCHARGE INSTRUCTIONS
Pacemaker Generator Change   WHAT YOU NEED TO KNOW:   The pacemaker generator is the metal piece that you can feel under your skin  A battery is sealed within your generator  Your healthcare provider will check the generator for battery power and function during scheduled generator checks  He will decide when it is time for your generator to be changed  He will replace your generator before the battery runs out of power completely  Pacemaker batteries can last up to 12 years  He will also replace the generator if it does not function properly  DISCHARGE INSTRUCTIONS:   Call 911 for any of the following:   · You feel lightheaded, short of breath, and have chest pain  · You have any of the following signs of a heart attack:      ¨ Squeezing, pressure, or pain in your chest that lasts longer than 5 minutes or returns    ¨ Discomfort or pain in your back, neck, jaw, stomach, or arm     ¨ Trouble breathing    ¨ Nausea or vomiting    ¨ Lightheadedness or a sudden cold sweat, especially with chest pain or trouble breathing  Seek care immediately if:   · You are dizzy, or you faint  · Your stitches come apart  · Your pulse is lower than the set rate or higher than 100  Contact your healthcare provider if:   · You have a fever or chills  · Your wound is red, swollen, or draining pus  · You have hiccups that last more than 48 hours  · You have questions or concerns about your condition or care  Care for your wound as directed:  Keep your bandage clean and dry  Your healthcare provider will teach you how to care for your wound  Activity after your procedure:   · Take baths instead of showers  for as long as directed  Baths allow you to keep your wound dry and helps lower your risk for infection  · Do not rub or massage  the area around your pacemaker  You may damage the leads or move them out of place  · Eat a variety of healthy foods to help with healing   Healthy foods include fruits, vegetables, whole-grain breads, low-fat dairy products, beans, lean meats, and fish  · Do not lift anything heavier than 3 pounds with the arm closest to your pacemaker  Do not  lift the arm over your head until your healthcare provider says it is okay  Pacemaker safety:   · Wear medical alert jewelry, such as a bracelet  Carry a pacemaker identification card  These items will inform others that you have a pacemaker  Ask where to get these items  · Check your pulse every day  Check for 1 minute while you are resting  Write down your pulse rates and keep a record of the results  · Tell all healthcare providers that you have a pacemaker  Some procedures may interfere with your pacemaker  · Do not play full contact sports  An example is football  Contact sports may damage your pacemaker  Ask your healthcare provider how much and what kinds of physical activity are safe for you  · Use your cell phone on the ear opposite from your pacemaker  Do not carry your cell phone in your shirt pocket over your chest      · Show airport security your pacemaker card before you go through the metal detectors  Metal detectors may alert security because of the small amount of metal in your pacemaker  Step away from machine if you feel dizzy or your heart rate increases  If a handheld wand is used, ask that security agents do not hold the wand over your pacemaker longer than a few seconds  Your pacemaker function or programming may be affected by the wand  Electromagnetic interference:  Electromechanical interference (TARI) can affect your pacemaker function  Move 4 to 6 feet away from the source if you have dizziness or palpitations  Your pulse should return to normal  Household items, such as a microwaves and electric blankets, do not  affect your pacemaker's function   The following  may  affect your pacemaker's function:  · Antennas, and amplifiers used in audio and laboratory equipment    · Electric cautery equipment, MRI equipment, and TENS units    · Unshielded motors on cars and boats  Follow up with your healthcare provider as directed: You will need regular checks to make sure your pacemaker is working properly  Some checks may be done over the telephone  Ask your healthcare provider about them  Write down your questions so you remember to ask them during your visits  © 2017 [employer] Information is for End User's use only and may not be sold, redistributed or otherwise used for commercial purposes  All illustrations and images included in CareNotes® are the copyrighted property of [employer]  or [employer]ia Zambr[employer]  The above information is an  only  It is not intended as medical advice for individual conditions or treatments  Talk to your doctor, nurse or pharmacist before following any medical regimen to see if it is safe and effective for you  Albumin, Iodinated I-125 (By injection)   Albumin, Iodinated I-125 (al-BUE-min, EYE-oh-di-nay-manish I-125)  Used as part of a test to see how much blood is moving through your heart and how well it moves  Brand Name(s):   There may be other brand names for this medicine  When This Medicine Should Not Be Used: This medicine is not right for everyone  Do not use it if you had an allergic reaction to albumin or iodinated I-125, or if you are pregnant or breastfeeding  How to Use This Medicine:   Injectable  · Your doctor will prescribe your dose and schedule  This medicine is given through a needle placed in a vein  · A nurse or other health provider will give you this medicine  Drugs and Foods to Avoid:      Ask your doctor or pharmacist before using any other medicine, including over-the-counter medicines, vitamins, and herbal products  Warnings While Using This Medicine:   · It is not safe to take this medicine during pregnancy  It could harm an unborn baby   Tell your doctor right away if you become pregnant  · This medicine contains a small amount of radioactive material that should not cause harmful effects  If you have any questions or concerns about this medicine, talk to your doctor  Possible Side Effects While Using This Medicine:   Call your doctor right away if you notice any of these side effects:  · Allergic reaction: Itching or hives, swelling in your face or hands, swelling or tingling in your mouth or throat, chest tightness, trouble breathing  If you notice other side effects that you think are caused by this medicine, tell your doctor  Call your doctor for medical advice about side effects  You may report side effects to FDA at 0-927-FDA-1666  © 2017 [employer] Information is for End User's use only and may not be sold, redistributed or otherwise used for commercial purposes  The above information is an  only  It is not intended as medical advice for individual conditions or treatments  Talk to your doctor, nurse or pharmacist before following any medical regimen to see if it is safe and effective for you

## 2018-10-23 NOTE — H&P
Below is my note from 9/4/18 arranging for a pacemaker change  Since then, she was admitted for pelvic fracture  No surgery was indicated  She now resides at [facility] for at least rehab  She returns for device change  I have spoken with her daughter, Mario Deitz, who is POA  She confirms that all want to proceed  No recent chest pain or dyspnea  No change in exam     Justino Bellamy MD  10/23/2018    Again below is from 9/4/18     Plan:      Pacemaker failure  We discussed pros and cons of pacer change with patient and daughter  All agree to proceed knowing risks      Hypertension  Too well controlled  OK to d/c amlodipine      Dyslipidemia  Given age and weight loss, ok to d/c atorvastatin      Complete atrioventricular block (HCC)  Currently with intact AV conduction         Follow up Plan:Device f/u after pacer change out         HPI:   Patient is seen today as her device has reached end of life parameters  She had a pacemaker placed in 2009  This is a dual-chamber pacer which was placed because of at least transient complete heart block  She also has a history of distant coronary stent  She currently resides in a personal care home  She has had some loss of balance of late  She is still able to hear some and knows her name and in general understands what's going on  She requires a walker for any ambulation  No recent chest pain or chest pressure    There has been some weight loss over the past few years         No results found for this visit on 09/04/18         Surgical History         Past Surgical History:   Procedure Laterality Date    CARDIAC PACEMAKER PLACEMENT   12/09/2009     Darius Laguna DR Model [id number] Serial # [id number]    CORONARY ANGIOPLASTY WITH STENT PLACEMENT         unsure of date         CMP:         Lab Results   Component Value Date      08/23/2018     K 3 9 08/23/2018      08/23/2018     CO2 29 08/23/2018     BUN 18 08/23/2018     CREATININE 0 95 08/23/2018     EGFR 50 08/23/2018         Lipid Profile: No results found for: CHOL, TRIG, HDL, LDL        Review of Systems   10  point ROS  was otherwise non pertinent or negative except as per HPI or as below  Gait:   Uses a walker  Hard of hearing  Appetite is fair            Objective:      /58   Pulse 64   Ht 5' (1 524 m)   Wt 44 9 kg (99 lb)   BMI 19 33 kg/m²      PHYSICAL EXAM:    General:  Normal appearance in no distress  Eyes:  Anicteric  Oral mucosa:  Moist   Neck:  No JVD  Carotid upstrokes are brisk without bruits  No masses  Chest:  Clear to auscultation and percussion  Cardiac:  Normal PMI  Normal S1 and S2  No murmur gallop or rub  Abdomen:  Soft and nontender  No palpable organomegaly or aortic enlargement  Extremities:  No peripheral edema  Musculoskeletal:  Symmetric  Vascular:  Femoral pulses are brisk without bruits  Popliteal pulses are intact bilaterally  Pedal pulses are intact  Neuro:  Grossly symmetric  Psych:  Alert and oriented x3    However, she has a little bit of difficulty coming up with the proper answers            Current Outpatient Prescriptions:     aspirin 325 mg tablet, Take 325 mg by mouth daily, Disp: , Rfl:     Cholecalciferol 2000 units TABS, Take 1 tablet by mouth daily, Disp: , Rfl:     cycloSPORINE (RESTASIS) 0 05 % ophthalmic emulsion, Administer 1 drop to both eyes 2 (two) times a day, Disp: , Rfl:     levothyroxine 25 mcg tablet, Take 25 mcg by mouth daily, Disp: , Rfl:     metoprolol tartrate (LOPRESSOR) 25 mg tablet, Take 0 5 tablets (12 5 mg total) by mouth every 12 (twelve) hours, Disp: 60 tablet, Rfl: 0    Nutritional Supplements (ENSURE ACTIVE PO), Take by mouth daily, Disp: , Rfl:     pantoprazole (PROTONIX) 40 mg tablet, Take 40 mg by mouth daily, Disp: , Rfl:     polyvinyl alcohol (LIQUIFILM TEARS) 1 4 % ophthalmic solution, Administer 2 drops to both eyes daily, Disp: , Rfl:   No Known Allergies  Medical History Past Medical History:   Diagnosis Date    Atrioventricular block, complete ([facility] [address] )      CAD (coronary artery disease)       remote stenting    Cardiac pacemaker in situ 12/09/2009    Cerebral infarct ([facility] [address] )       due to unspecified occlusion and stenosis of unspecified cerebral artery    Essential (primary) hypertension      History of echocardiogram 04/27/2015     EF 55%, mild MR, mild TR      Mixed hyperlipidemia      Subarachnoid hemorrhage (HCC)                        History   Smoking Status    Former Smoker   Smokeless Tobacco    Never Used                      Electronically signed by Sherryle Levels, MD at 9/4/2018  1:36 PM

## 2018-10-23 NOTE — ANESTHESIA POSTPROCEDURE EVALUATION
Post-Op Assessment Note      CV Status:  Stable    Mental Status:  Alert and awake    Hydration Status:  Euvolemic    PONV Controlled:  Controlled    Airway Patency:  Patent    Post Op Vitals Reviewed: Yes          Staff: CRNA           BP      Temp 98 2 °F (36 8 °C) (10/23/18 0852)    Pulse     Resp      SpO2

## 2018-10-28 PROBLEM — E11.9 DIABETES (HCC): Status: ACTIVE | Noted: 2018-10-28

## 2018-11-26 ENCOUNTER — OFFICE VISIT (OUTPATIENT)
Dept: CARDIOLOGY CLINIC | Facility: CLINIC | Age: 83
End: 2018-11-26
Payer: MEDICARE

## 2018-11-26 VITALS — HEART RATE: 68 BPM

## 2018-11-26 DIAGNOSIS — I44.2 COMPLETE ATRIOVENTRICULAR BLOCK (HCC): Primary | ICD-10-CM

## 2018-11-26 DIAGNOSIS — E78.2 MIXED HYPERLIPIDEMIA: Chronic | ICD-10-CM

## 2018-11-26 DIAGNOSIS — I10 ESSENTIAL HYPERTENSION: Chronic | ICD-10-CM

## 2018-11-26 DIAGNOSIS — E78.5 DYSLIPIDEMIA: ICD-10-CM

## 2018-11-26 DIAGNOSIS — I25.10 CORONARY ARTERY DISEASE INVOLVING NATIVE CORONARY ARTERY OF NATIVE HEART WITHOUT ANGINA PECTORIS: ICD-10-CM

## 2018-11-26 PROCEDURE — 99214 OFFICE O/P EST MOD 30 MIN: CPT | Performed by: INTERNAL MEDICINE

## 2018-11-26 PROCEDURE — 1124F ACP DISCUSS-NO DSCNMKR DOCD: CPT | Performed by: INTERNAL MEDICINE

## 2018-11-26 NOTE — PROGRESS NOTES
Patient ID: Faustino Rendon is a 80 y o  female  Plan:      Complete atrioventricular block ([address] )  S/p generator change 10/23/18  Dual chamber [employer] pacemaker  Hypertension  BP adequately controlled  Continue small dose of metoprolol  CAD (coronary artery disease)  S/p remote stenting  Details unavailable  Continue ASA  Dyslipidemia  Statin discontinued previously in light of age and weight loss  Follow up Plan:  BP difficult to auscultate - systolic pressure about 928  Incision site from recent generator change is clean, dry and intact  No medication changes today  Follow up will be primarily through device clinic  HPI: The patient presents to follow up regarding complete heart block (s/p pacemaker generator change 10/23/18), CAD(s/p stenting), HTN and HLD  She resides at [facility]  She was mostly non-verbal in the office today so thorough history was unable to be obtained  She did not offer any complaints  Past Surgical History:   Procedure Laterality Date   Rolly Lemme CARDIAC PACEMAKER PLACEMENT  12/09/2009    Biotronik Philos DR Model [id number] Serial # [id number]    CARDIAC PACEMAKER PLACEMENT Left 10/23/2018    Procedure: REMOVE AND REPLACE DUAL LEAD PACEMAKER;  Surgeon: Aron Toribio MD;  Location: [address] [place] [address] OR;  Service: Cardiology    CORONARY ANGIOPLASTY WITH STENT PLACEMENT      unsure of date    EYE SURGERY      FRACTURE SURGERY      SKIN BIOPSY       CMP:   Lab Results   Component Value Date    K 3 9 10/01/2018    CL 99 10/01/2018    CO2 33 (H) 10/01/2018    BUN 14 10/01/2018    CREATININE 0 76 10/01/2018    EGFR 66 10/01/2018       Lipid Profile: No results found for: CHOL, TRIG, HDL, LDL      Review of Systems   10  point ROS  was otherwise non pertinent or negative except as per HPI or as below  Gait: In a wheelchair  Objective:     Pulse 68     PHYSICAL EXAM:  General:  Normal appearance in no distress  Eyes:  Anicteric  Oral mucosa:  Moist   Neck:  No JVD  Carotid upstrokes are brisk without bruits  No masses  Chest:  Clear to auscultation and percussion  Cardiac:  Normal PMI  Normal S1 and S2  No murmur gallop or rub  Device in L subclavian region, incision clean/dry/intact  Abdomen:  Soft and nontender  No palpable organomegaly or aortic enlargement  Extremities:  No peripheral edema  Musculoskeletal:  Symmetric  Vascular:  Femoral pulses are brisk without bruits  Popliteal pulses are intact bilaterally  Pedal pulses are intact  Neuro:  Grossly symmetric  Psych:  Unable to tell me name/place/day         Current Outpatient Prescriptions:     Acetaminophen 325 MG CAPS, Take 650 mg by mouth every 6 (six) hours as needed, Disp: , Rfl:     aspirin 325 mg tablet, Take 325 mg by mouth daily, Disp: , Rfl:     Cholecalciferol 2000 units TABS, Take 1 tablet by mouth daily, Disp: , Rfl:     cycloSPORINE (RESTASIS) 0 05 % ophthalmic emulsion, Administer 1 drop to both eyes 2 (two) times a day, Disp: , Rfl:     levothyroxine 25 mcg tablet, Take 25 mcg by mouth daily, Disp: , Rfl:     metoprolol tartrate (LOPRESSOR) 25 mg tablet, Take 0 5 tablets (12 5 mg total) by mouth every 12 (twelve) hours, Disp: 60 tablet, Rfl: 0    Nutritional Supplements (ENSURE ACTIVE PO), Take by mouth daily, Disp: , Rfl:     pantoprazole (PROTONIX) 40 mg tablet, Take 40 mg by mouth daily, Disp: , Rfl:     polyvinyl alcohol (LIQUIFILM TEARS) 1 4 % ophthalmic solution, Administer 2 drops to both eyes daily, Disp: , Rfl:   No Known Allergies  Past Medical History:   Diagnosis Date    Aphasia as late effect of cerebrovascular accident     Arthritis     Atrioventricular block, complete ([address] )     CAD (coronary artery disease)     remote stenting    Cardiac pacemaker in situ 12/09/2009    Cerebral infarct ([address] )     due to unspecified occlusion and stenosis of unspecified cerebral artery    Diabetes ([address] ) 10/28/2018    Disease of thyroid gland     Dysphagia     Essential (primary) hypertension     GERD (gastroesophageal reflux disease)     PUD    Gout     History of echocardiogram 04/27/2015    EF 55%, mild MR, mild TR      Hypothyroidism     Mixed hyperlipidemia     Seizures (HCC)     Shoulder fracture, left     Stroke (HCC)     dysphagia, aphasia    Subarachnoid hemorrhage (HCC)     Wrist fracture, closed, left, with routine healing, subsequent encounter 2003           History   Smoking Status    Former Smoker    Types: Cigarettes   Smokeless Tobacco    Never Used

## 2018-12-18 ENCOUNTER — IN-CLINIC DEVICE VISIT (OUTPATIENT)
Dept: CARDIOLOGY CLINIC | Facility: CLINIC | Age: 83
End: 2018-12-18
Payer: MEDICARE

## 2018-12-18 DIAGNOSIS — I49.5 SICK SINUS SYNDROME (HCC): Primary | ICD-10-CM

## 2018-12-18 DIAGNOSIS — Z45.010 ENCOUNTER FOR CHECKING AND TESTING OF CARDIAC PACEMAKER PULSE GENERATOR (BATTERY): ICD-10-CM

## 2018-12-18 PROCEDURE — 93280 PM DEVICE PROGR EVAL DUAL: CPT | Performed by: INTERNAL MEDICINE

## 2018-12-18 NOTE — PROGRESS NOTES
device check pacer 3 mode switching, longest 6 seconds  Normal battery function      Current Outpatient Prescriptions:     Acetaminophen 325 MG CAPS, Take 650 mg by mouth every 6 (six) hours as needed, Disp: , Rfl:     aspirin 325 mg tablet, Take 325 mg by mouth daily, Disp: , Rfl:     Cholecalciferol 2000 units TABS, Take 1 tablet by mouth daily, Disp: , Rfl:     cycloSPORINE (RESTASIS) 0 05 % ophthalmic emulsion, Administer 1 drop to both eyes 2 (two) times a day, Disp: , Rfl:     levothyroxine 25 mcg tablet, Take 25 mcg by mouth daily, Disp: , Rfl:     metoprolol tartrate (LOPRESSOR) 25 mg tablet, Take 0 5 tablets (12 5 mg total) by mouth every 12 (twelve) hours, Disp: 60 tablet, Rfl: 0    Nutritional Supplements (ENSURE ACTIVE PO), Take by mouth daily, Disp: , Rfl:     pantoprazole (PROTONIX) 40 mg tablet, Take 40 mg by mouth daily, Disp: , Rfl:     polyvinyl alcohol (LIQUIFILM TEARS) 1 4 % ophthalmic solution, Administer 2 drops to both eyes daily, Disp: , Rfl:

## 2019-01-22 ENCOUNTER — HOSPITAL ENCOUNTER (OUTPATIENT)
Facility: HOSPITAL | Age: 84
Setting detail: OBSERVATION
Discharge: HOME | End: 2019-01-24
Attending: EMERGENCY MEDICINE | Admitting: INTERNAL MEDICINE
Payer: MEDICARE

## 2019-01-22 ENCOUNTER — APPOINTMENT (EMERGENCY)
Dept: CT IMAGING | Facility: HOSPITAL | Age: 84
End: 2019-01-22
Payer: MEDICARE

## 2019-01-22 DIAGNOSIS — I25.10 CORONARY ARTERY DISEASE INVOLVING NATIVE CORONARY ARTERY OF NATIVE HEART WITHOUT ANGINA PECTORIS: Primary | ICD-10-CM

## 2019-01-22 DIAGNOSIS — R07.9 CHEST PAIN: ICD-10-CM

## 2019-01-22 DIAGNOSIS — R10.9 ABDOMINAL PAIN: ICD-10-CM

## 2019-01-22 DIAGNOSIS — R77.8 ELEVATED TROPONIN: ICD-10-CM

## 2019-01-22 LAB
ALBUMIN SERPL BCP-MCNC: 3.3 G/DL (ref 3.5–5)
ALP SERPL-CCNC: 111 U/L (ref 46–116)
ALT SERPL W P-5'-P-CCNC: 8 U/L (ref 12–78)
ANION GAP SERPL CALCULATED.3IONS-SCNC: 5 MMOL/L (ref 4–13)
AST SERPL W P-5'-P-CCNC: 16 U/L (ref 5–45)
BASOPHILS # BLD AUTO: 0.05 THOUSANDS/ΜL (ref 0–0.1)
BASOPHILS NFR BLD AUTO: 1 % (ref 0–1)
BILIRUB SERPL-MCNC: 0.5 MG/DL (ref 0.2–1)
BUN SERPL-MCNC: 22 MG/DL (ref 5–25)
CALCIUM SERPL-MCNC: 9.7 MG/DL (ref 8.3–10.1)
CHLORIDE SERPL-SCNC: 101 MMOL/L (ref 100–108)
CO2 SERPL-SCNC: 34 MMOL/L (ref 21–32)
CREAT SERPL-MCNC: 0.87 MG/DL (ref 0.6–1.3)
EOSINOPHIL # BLD AUTO: 0.11 THOUSAND/ΜL (ref 0–0.61)
EOSINOPHIL NFR BLD AUTO: 2 % (ref 0–6)
ERYTHROCYTE [DISTWIDTH] IN BLOOD BY AUTOMATED COUNT: 15.9 % (ref 11.6–15.1)
GFR SERPL CREATININE-BSD FRML MDRD: 56 ML/MIN/1.73SQ M
GLUCOSE SERPL-MCNC: 87 MG/DL (ref 65–140)
HCT VFR BLD AUTO: 45.1 % (ref 34.8–46.1)
HGB BLD-MCNC: 13.9 G/DL (ref 11.5–15.4)
IMM GRANULOCYTES # BLD AUTO: 0.02 THOUSAND/UL (ref 0–0.2)
IMM GRANULOCYTES NFR BLD AUTO: 0 % (ref 0–2)
LACTATE SERPL-SCNC: 0.8 MMOL/L (ref 0.5–2)
LIPASE SERPL-CCNC: 405 U/L (ref 73–393)
LYMPHOCYTES # BLD AUTO: 1.72 THOUSANDS/ΜL (ref 0.6–4.47)
LYMPHOCYTES NFR BLD AUTO: 27 % (ref 14–44)
MCH RBC QN AUTO: 26.1 PG (ref 26.8–34.3)
MCHC RBC AUTO-ENTMCNC: 30.8 G/DL (ref 31.4–37.4)
MCV RBC AUTO: 85 FL (ref 82–98)
MONOCYTES # BLD AUTO: 1.27 THOUSAND/ΜL (ref 0.17–1.22)
MONOCYTES NFR BLD AUTO: 20 % (ref 4–12)
NEUTROPHILS # BLD AUTO: 3.15 THOUSANDS/ΜL (ref 1.85–7.62)
NEUTS SEG NFR BLD AUTO: 50 % (ref 43–75)
NRBC BLD AUTO-RTO: 0 /100 WBCS
PLATELET # BLD AUTO: 193 THOUSANDS/UL (ref 149–390)
PMV BLD AUTO: 10.6 FL (ref 8.9–12.7)
POTASSIUM SERPL-SCNC: 4.4 MMOL/L (ref 3.5–5.3)
PROT SERPL-MCNC: 7.5 G/DL (ref 6.4–8.2)
RBC # BLD AUTO: 5.33 MILLION/UL (ref 3.81–5.12)
SODIUM SERPL-SCNC: 140 MMOL/L (ref 136–145)
TROPONIN I SERPL-MCNC: 0.22 NG/ML
WBC # BLD AUTO: 6.32 THOUSAND/UL (ref 4.31–10.16)

## 2019-01-22 PROCEDURE — 83690 ASSAY OF LIPASE: CPT | Performed by: EMERGENCY MEDICINE

## 2019-01-22 PROCEDURE — 36415 COLL VENOUS BLD VENIPUNCTURE: CPT | Performed by: EMERGENCY MEDICINE

## 2019-01-22 PROCEDURE — 93005 ELECTROCARDIOGRAM TRACING: CPT

## 2019-01-22 PROCEDURE — 71275 CT ANGIOGRAPHY CHEST: CPT

## 2019-01-22 PROCEDURE — 80053 COMPREHEN METABOLIC PANEL: CPT | Performed by: EMERGENCY MEDICINE

## 2019-01-22 PROCEDURE — 85025 COMPLETE CBC W/AUTO DIFF WBC: CPT | Performed by: EMERGENCY MEDICINE

## 2019-01-22 PROCEDURE — 84484 ASSAY OF TROPONIN QUANT: CPT | Performed by: EMERGENCY MEDICINE

## 2019-01-22 PROCEDURE — 99285 EMERGENCY DEPT VISIT HI MDM: CPT

## 2019-01-22 PROCEDURE — 83605 ASSAY OF LACTIC ACID: CPT | Performed by: EMERGENCY MEDICINE

## 2019-01-22 PROCEDURE — 1123F ACP DISCUSS/DSCN MKR DOCD: CPT | Performed by: PHYSICIAN ASSISTANT

## 2019-01-22 PROCEDURE — 74175 CTA ABDOMEN W/CONTRAST: CPT

## 2019-01-22 RX ADMIN — IOHEXOL 100 ML: 350 INJECTION, SOLUTION INTRAVENOUS at 23:59

## 2019-01-23 PROBLEM — S22.49XA MULTIPLE RIB FRACTURES: Status: ACTIVE | Noted: 2019-01-23

## 2019-01-23 PROBLEM — R62.7 FAILURE TO THRIVE IN ADULT: Status: ACTIVE | Noted: 2019-01-23

## 2019-01-23 PROBLEM — R13.10 DYSPHAGIA: Status: ACTIVE | Noted: 2019-01-23

## 2019-01-23 PROBLEM — I21.4 NSTEMI (NON-ST ELEVATED MYOCARDIAL INFARCTION) (HCC): Status: ACTIVE | Noted: 2019-01-23

## 2019-01-23 LAB
ANION GAP SERPL CALCULATED.3IONS-SCNC: 6 MMOL/L (ref 4–13)
ATRIAL RATE: 67 BPM
BASOPHILS # BLD AUTO: 0.03 THOUSANDS/ΜL (ref 0–0.1)
BASOPHILS NFR BLD AUTO: 1 % (ref 0–1)
BUN SERPL-MCNC: 19 MG/DL (ref 5–25)
CALCIUM SERPL-MCNC: 9.4 MG/DL (ref 8.3–10.1)
CHLORIDE SERPL-SCNC: 101 MMOL/L (ref 100–108)
CO2 SERPL-SCNC: 32 MMOL/L (ref 21–32)
CREAT SERPL-MCNC: 0.84 MG/DL (ref 0.6–1.3)
EOSINOPHIL # BLD AUTO: 0.1 THOUSAND/ΜL (ref 0–0.61)
EOSINOPHIL NFR BLD AUTO: 2 % (ref 0–6)
ERYTHROCYTE [DISTWIDTH] IN BLOOD BY AUTOMATED COUNT: 15.9 % (ref 11.6–15.1)
GFR SERPL CREATININE-BSD FRML MDRD: 58 ML/MIN/1.73SQ M
GLUCOSE SERPL-MCNC: 83 MG/DL (ref 65–140)
HCT VFR BLD AUTO: 40.8 % (ref 34.8–46.1)
HGB BLD-MCNC: 12.7 G/DL (ref 11.5–15.4)
IMM GRANULOCYTES # BLD AUTO: 0.02 THOUSAND/UL (ref 0–0.2)
IMM GRANULOCYTES NFR BLD AUTO: 0 % (ref 0–2)
LYMPHOCYTES # BLD AUTO: 1.46 THOUSANDS/ΜL (ref 0.6–4.47)
LYMPHOCYTES NFR BLD AUTO: 25 % (ref 14–44)
MCH RBC QN AUTO: 26.1 PG (ref 26.8–34.3)
MCHC RBC AUTO-ENTMCNC: 31.1 G/DL (ref 31.4–37.4)
MCV RBC AUTO: 84 FL (ref 82–98)
MONOCYTES # BLD AUTO: 1.31 THOUSAND/ΜL (ref 0.17–1.22)
MONOCYTES NFR BLD AUTO: 22 % (ref 4–12)
NEUTROPHILS # BLD AUTO: 3 THOUSANDS/ΜL (ref 1.85–7.62)
NEUTS SEG NFR BLD AUTO: 50 % (ref 43–75)
NRBC BLD AUTO-RTO: 0 /100 WBCS
P AXIS: 37 DEGREES
PLATELET # BLD AUTO: 181 THOUSANDS/UL (ref 149–390)
PMV BLD AUTO: 10.7 FL (ref 8.9–12.7)
POTASSIUM SERPL-SCNC: 3.9 MMOL/L (ref 3.5–5.3)
PR INTERVAL: 204 MS
QRS AXIS: -38 DEGREES
QRSD INTERVAL: 110 MS
QT INTERVAL: 430 MS
QTC INTERVAL: 454 MS
RBC # BLD AUTO: 4.87 MILLION/UL (ref 3.81–5.12)
SODIUM SERPL-SCNC: 139 MMOL/L (ref 136–145)
T WAVE AXIS: -5 DEGREES
TROPONIN I SERPL-MCNC: 0.1 NG/ML
TROPONIN I SERPL-MCNC: 0.14 NG/ML
TROPONIN I SERPL-MCNC: 0.16 NG/ML
TROPONIN I SERPL-MCNC: 0.17 NG/ML
VENTRICULAR RATE: 67 BPM
WBC # BLD AUTO: 5.92 THOUSAND/UL (ref 4.31–10.16)

## 2019-01-23 PROCEDURE — 84484 ASSAY OF TROPONIN QUANT: CPT | Performed by: EMERGENCY MEDICINE

## 2019-01-23 PROCEDURE — 93010 ELECTROCARDIOGRAM REPORT: CPT | Performed by: INTERNAL MEDICINE

## 2019-01-23 PROCEDURE — 80048 BASIC METABOLIC PNL TOTAL CA: CPT | Performed by: INTERNAL MEDICINE

## 2019-01-23 PROCEDURE — 84484 ASSAY OF TROPONIN QUANT: CPT | Performed by: INTERNAL MEDICINE

## 2019-01-23 PROCEDURE — 99219 PR INITIAL OBSERVATION CARE/DAY 50 MINUTES: CPT | Performed by: INTERNAL MEDICINE

## 2019-01-23 PROCEDURE — 36415 COLL VENOUS BLD VENIPUNCTURE: CPT | Performed by: EMERGENCY MEDICINE

## 2019-01-23 PROCEDURE — 85025 COMPLETE CBC W/AUTO DIFF WBC: CPT | Performed by: INTERNAL MEDICINE

## 2019-01-23 PROCEDURE — 99214 OFFICE O/P EST MOD 30 MIN: CPT | Performed by: PHYSICIAN ASSISTANT

## 2019-01-23 RX ORDER — LEVOTHYROXINE SODIUM 0.03 MG/1
25 TABLET ORAL
Status: DISCONTINUED | OUTPATIENT
Start: 2019-01-23 | End: 2019-01-24 | Stop reason: HOSPADM

## 2019-01-23 RX ORDER — POLYVINYL ALCOHOL 14 MG/ML
2 SOLUTION/ DROPS OPHTHALMIC DAILY
Status: DISCONTINUED | OUTPATIENT
Start: 2019-01-23 | End: 2019-01-23 | Stop reason: SDUPTHER

## 2019-01-23 RX ORDER — PANTOPRAZOLE SODIUM 40 MG/1
40 TABLET, DELAYED RELEASE ORAL DAILY
Status: DISCONTINUED | OUTPATIENT
Start: 2019-01-23 | End: 2019-01-24 | Stop reason: HOSPADM

## 2019-01-23 RX ORDER — SODIUM CHLORIDE 9 MG/ML
75 INJECTION, SOLUTION INTRAVENOUS CONTINUOUS
Status: DISCONTINUED | OUTPATIENT
Start: 2019-01-23 | End: 2019-01-24 | Stop reason: HOSPADM

## 2019-01-23 RX ORDER — POLYVINYL ALCOHOL 14 MG/ML
1 SOLUTION/ DROPS OPHTHALMIC EVERY 12 HOURS SCHEDULED
Status: DISCONTINUED | OUTPATIENT
Start: 2019-01-23 | End: 2019-01-24 | Stop reason: HOSPADM

## 2019-01-23 RX ORDER — MELATONIN
2000 DAILY
Status: DISCONTINUED | OUTPATIENT
Start: 2019-01-23 | End: 2019-01-24 | Stop reason: HOSPADM

## 2019-01-23 RX ORDER — ACETAMINOPHEN 325 MG/1
650 TABLET ORAL EVERY 6 HOURS PRN
Status: DISCONTINUED | OUTPATIENT
Start: 2019-01-23 | End: 2019-01-24 | Stop reason: HOSPADM

## 2019-01-23 RX ORDER — ASPIRIN 325 MG
325 TABLET ORAL DAILY
Status: DISCONTINUED | OUTPATIENT
Start: 2019-01-23 | End: 2019-01-24 | Stop reason: HOSPADM

## 2019-01-23 RX ADMIN — POLYVINYL ALCOHOL 1 DROP: 14 SOLUTION/ DROPS OPHTHALMIC at 09:01

## 2019-01-23 RX ADMIN — PANTOPRAZOLE SODIUM 40 MG: 40 TABLET, DELAYED RELEASE ORAL at 08:52

## 2019-01-23 RX ADMIN — METOPROLOL TARTRATE 12.5 MG: 25 TABLET ORAL at 08:52

## 2019-01-23 RX ADMIN — ASPIRIN 325 MG: 325 TABLET ORAL at 08:52

## 2019-01-23 RX ADMIN — ENOXAPARIN SODIUM 40 MG: 40 INJECTION SUBCUTANEOUS at 08:58

## 2019-01-23 RX ADMIN — VITAMIN D, TAB 1000IU (100/BT) 2000 UNITS: 25 TAB at 08:52

## 2019-01-23 RX ADMIN — METOPROLOL TARTRATE 12.5 MG: 25 TABLET ORAL at 22:59

## 2019-01-23 RX ADMIN — LEVOTHYROXINE SODIUM 25 MCG: 25 TABLET ORAL at 06:47

## 2019-01-23 RX ADMIN — SODIUM CHLORIDE 50 ML/HR: 0.9 INJECTION, SOLUTION INTRAVENOUS at 14:21

## 2019-01-23 NOTE — OCCUPATIONAL THERAPY NOTE
Occupational Therapy Cancellation Note        Patient Name: Mary Kate Kaufman  XEZECHARIAHICHRISTOPHER Date: 1/23/2019          OT orders received, chart review indicates elevated troponin levels, OT will defer evaluation at this time  OT will continue to follow patient and evaluate as appropriate

## 2019-01-23 NOTE — PROGRESS NOTES
Attempted to do admission, patient unable to answer questions  Reached out to Daughter and did not get an answer  Will let next nurse know and I will re-try tomorrow

## 2019-01-23 NOTE — PROGRESS NOTES
Progress Note - Luis Daniel Conley 5/2/1921, 80 y o  female MRN: [id number]    Unit/Bed#: -01 Encounter: [id number]    Primary Care Provider: Eliceo Douglas MD   Date and time admitted to hospital: 1/22/2019 10:31 PM    Post-admission note:    Dysphagia   Assessment & Plan    · CTA notes debris in the esophagus concerning for aspiration risk and esophageal dysmotility  · Prior CT Scan also showed a dilated, fluid filled esophagus - I am concerned about possible Achalasia in this patient with a progressive dysphagia to the point of her not tolerating a diet safely  · Per records, patient's daughter Dayton Louis reported that she has not been eating well and seems to tolerate only some soft foods  · Currently, patient is NPO and on gentle IVFs  · I tried calling the daughter numerous times without answer to discuss goals of care today: would she want a further work up and management of the dysphagia with EGD with possibility for treatment with botox injection of potential achalasia versus conservative measures/more of a hospice approach with plan to continue soft foods with known risk of aspiration, etc  (she is 80 with dementia, CVA with aphasia, appears cachetic/underweight, etc )  * NSTEMI (non-ST elevated myocardial infarction) [facility])   Assessment & Plan    · She has mildly elevated Troponins of undetermined significance  · She has a history of pacemaker placement and prior PCI for CAD remotely  · Appreciate cardiology input  Given advanced age and dementia, conservative management recommended  No plan for further work up  · On Asa and Metoprolol  Multiple rib fractures   Assessment & Plan    · CTA notes subacute to chronic fractures seen at the right 4th, 5th and 6th ribs  · Fall precautions  Failure to thrive in adult   Assessment & Plan    · Patient noted to be malnourished, cachectic with BMI of 16  Dementia   Assessment & Plan    · Has dementia and history of CVA with aphasia  Hypothyroidism   Assessment & Plan    On Synthroid 25 mcg daily  History is limited from patient due to dementia and aphasia  She denies abdominal pain or CP at present  Exam: Frail, underweight, cachetic female in NAD  CV: S1S2  Lungs" CTAB  Abdomen: soft, NT, +BS  Ext: No edema  Neuro: Alert, dementia/confused and garbled speech

## 2019-01-23 NOTE — PHYSICAL THERAPY NOTE
Physical Therapy Cancellation Note- pt has MI, advanced age and no activity orders, informed by nursing and another MD that pt will be admitted  Will defer eval at this time   Mame Acosta, PT

## 2019-01-23 NOTE — ED NOTES
Patient transported to [facility] [address] ELI Mejias  01/23/19 6006

## 2019-01-23 NOTE — H&P
H&P- Bello Goodman 5/2/1921, 80 y o  female MRN: [id number]    Unit/Bed#: ED 20 Encounter: [id number]    Primary Care Provider: Rhonda Fraser MD   Date and time admitted to hospital: 1/22/2019 10:31 PM        * NSTEMI (non-ST elevated myocardial infarction) [facility])   Assessment & Plan    Patient presenting with acute left upper quadrant/left rib area pain and found to have a troponin of 0 22  She is a poor historian given dementia and expressive aphasia from previous CVA  EKG does not show any acute ST changes  She does have T-wave inversions in the inferoolateral leads which are not new   -plan to admit patient for ACS rule out  Will cycle troponins  Will consult Cardiology in the morning  Continue home aspirin 325 mg daily  Continue home metoprolol 12 5 mg b i d   NPO pending cardiology eval        Gastroesophageal reflux disease without esophagitis   Assessment & Plan    Continue home Protonix 40 mg daily  Hypothyroidism   Assessment & Plan    Continue home Synthroid 25 mcg daily  VTE PROPHYLAXIS:  + SCDs, lovenox    CODE STATUS: FULL    Anticipated Length of Stay:  Patient will be admitted on an Observation basis with an anticipated length of stay of   2 midnights  Justification for Hospital Stay: NSTEMI      CHIEF COMPLAINT   LUQ pain, rib pain     HISTORY OF PRESENT ILLNESS  Bello Goodman is a 80year old female with past medical history notable for 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, dementia, complete heart block with pacemaker placement, coronary artery disease, non-insulin-dependent diabetes, hypertension, hypothyroidism, hyperlipidemia  She is a poor historian  She presents complaining of left upper quadrant abdominal pain as well as reported substernal chest pain which she had for 1-2 days in duration prior to current admission  During my questioning, patient denies any abdominal pain or chest pain  History is very limited due to aphasia and dementia    She denies any coughs, denies any fevers or chills  No nausea or vomiting  She denies any abdominal pain during my interview  She has a history of known aortic aneurysm for which she had a CT scan dissection protocol in the emergency department  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  The pancreas was unremarkable although her lipase was slightly elevated at 405  Her troponin was 0 22 but no acute ST changes have been noticed on her EKG  Her EKG does have right bundle branch block and inferolateral TWI which were seen in previous EKGs  Her previous troponins were 0 04 and 0 05 but not as high as 0 22 seen today  Given troponinemia and concern for NSTEMI, Medicine was consulted for admission further management for ACS r/o  REVIEW OF SYSTEMS  Limited due to aphasia and dementia    PMH/PSH    Past Medical History:   Diagnosis Date    Aphasia as late effect of cerebrovascular accident     Arthritis     Atrioventricular block, complete ([address] )     CAD (coronary artery disease)     remote stenting    Cardiac pacemaker in situ 12/09/2009    Cerebral infarct ([address] )     due to unspecified occlusion and stenosis of unspecified cerebral artery    Diabetes ([facility] [address] ) 10/28/2018    Disease of thyroid gland     Dysphagia     Essential (primary) hypertension     GERD (gastroesophageal reflux disease)     PUD    Gout     History of echocardiogram 04/27/2015    EF 55%, mild MR, mild TR      Hypothyroidism     Mixed hyperlipidemia     Seizures (HCC)     Shoulder fracture, left     Stroke (HCC)     dysphagia, aphasia    Subarachnoid hemorrhage (HCC)     Wrist fracture, closed, left, with routine healing, subsequent encounter 2003       Past Surgical History:   Procedure Laterality Date   Suzette Riautumn CARDIAC PACEMAKER PLACEMENT  12/09/2009    Biotron[employer] Edvin PERKINS Model [id number] Serial # [id number]    CARDIAC PACEMAKER PLACEMENT Left 10/23/2018    Procedure: REMOVE AND REPLACE DUAL LEAD PACEMAKER;  Surgeon: Martinez Reid MD;  Location: [facility] MAIN OR;  Service: Cardiology    CORONARY ANGIOPLASTY WITH STENT PLACEMENT      unsure of date    EYE SURGERY      FRACTURE SURGERY      SKIN BIOPSY         ALLERGIES  No Known Allergies    HOME MEDICATIONS  No current facility-administered medications on file prior to encounter  Current Outpatient Prescriptions on File Prior to Encounter   Medication Sig    Acetaminophen 325 MG CAPS Take 650 mg by mouth every 6 (six) hours as needed    aspirin 325 mg tablet Take 325 mg by mouth daily    Cholecalciferol 2000 units TABS Take 1 tablet by mouth daily    cycloSPORINE (RESTASIS) 0 05 % ophthalmic emulsion Administer 1 drop to both eyes 2 (two) times a day    levothyroxine 25 mcg tablet Take 25 mcg by mouth daily    metoprolol tartrate (LOPRESSOR) 25 mg tablet Take 0 5 tablets (12 5 mg total) by mouth every 12 (twelve) hours    Nutritional Supplements (ENSURE ACTIVE PO) Take by mouth daily    pantoprazole (PROTONIX) 40 mg tablet Take 40 mg by mouth daily    polyvinyl alcohol (LIQUIFILM TEARS) 1 4 % ophthalmic solution Administer 2 drops to both eyes daily         SOCIAL HISTORY     Marital Status:    Substance Use History:   History   Alcohol Use No     History   Smoking Status    Former Smoker    Types: Cigarettes   Smokeless Tobacco    Never Used     History   Drug Use No       FAMILY HISTORY  Noncontributory to current HPI    OBJECTIVE    Vitals:   Blood Pressure: (!) 171/97 (01/22/19 2236)  Pulse: 64 (01/22/19 2345)  Temperature: 98 1 °F (36 7 °C) (01/22/19 2236)  Temp Source: Oral (01/22/19 2236)  Respirations: (!) 29 (01/22/19 2345)  Weight - Scale: 44 6 kg (98 lb 5 2 oz) (01/22/19 2236)  SpO2: 97 % (01/22/19 2345)    GENERAL:  Alert and answers yes/no questions  Otherwise difficult to assess orientation  HEENT: atraumatic, normocephalic  PERLAA  EOMI  NECK- supple, no JVD, no lymphadenopathy, no thryomegaly    CHEST:  Clear to auscultation bilaterally  CVS: S1, S2   Regular rate and rhythm, 3/6 systolic murmur at the right upper sternal border radiating the clavicle , no  rub or gallops  ABDOMEN:  Soft, nontender, nondistended, normoactive bowel sounds  Neuro- Limited exam but able to move upper and lower extremities  She states does not walk at baseline  EXTREMITIES: No cyanosis/clubbing or edema  Poor muscle mass  LAB DATA    Results Reviewed     Procedure Component Value Units Date/Time    Lactic acid, plasma [[id number]]  (Normal) Collected:  01/22/19 2258    Lab Status:  Final result Specimen:  Blood from Arm, Right Updated:  01/22/19 2331     LACTIC ACID 0 8 mmol/L     Narrative:         Result may be elevated if tourniquet was used during collection  Troponin I [[id number]]  (Abnormal) Collected:  01/22/19 2258    Lab Status:  Final result Specimen:  Blood from Arm, Right Updated:  01/22/19 2330     Troponin I 0 22 (H) ng/mL     CMP [[id number]]  (Abnormal) Collected:  01/22/19 2259    Lab Status:  Final result Specimen:  Blood from Arm, Right Updated:  01/22/19 2326     Sodium 140 mmol/L      Potassium 4 4 mmol/L      Chloride 101 mmol/L      CO2 34 (H) mmol/L      ANION GAP 5 mmol/L      BUN 22 mg/dL      Creatinine 0 87 mg/dL      Glucose 87 mg/dL      Calcium 9 7 mg/dL      AST 16 U/L      ALT 8 (L) U/L      Alkaline Phosphatase 111 U/L      Total Protein 7 5 g/dL      Albumin 3 3 (L) g/dL      Total Bilirubin 0 50 mg/dL      eGFR 56 ml/min/1 73sq m     Narrative:         National Kidney Disease Education Program recommendations are as follows:  GFR calculation is accurate only with a steady state creatinine  Chronic Kidney disease less than 60 ml/min/1 73 sq  meters  Kidney failure less than 15 ml/min/1 73 sq  meters      Lipase [[id number]]  (Abnormal) Collected:  01/22/19 2259    Lab Status:  Final result Specimen:  Blood from Arm, Right Updated:  01/22/19 2326     Lipase 405 (H) u/L     CBC and differential [[id number]]  (Abnormal) Collected:  01/22/19 2258    Lab Status:  Final result Specimen:  Blood from Arm, Right Updated:  01/22/19 2306     WBC 6 32 Thousand/uL      RBC 5 33 (H) Million/uL      Hemoglobin 13 9 g/dL      Hematocrit 45 1 %      MCV 85 fL      MCH 26 1 (L) pg      MCHC 30 8 (L) g/dL      RDW 15 9 (H) %      MPV 10 6 fL      Platelets 358 Thousands/uL      nRBC 0 /100 WBCs      Neutrophils Relative 50 %      Immat GRANS % 0 %      Lymphocytes Relative 27 %      Monocytes Relative 20 (H) %      Eosinophils Relative 2 %      Basophils Relative 1 %      Neutrophils Absolute 3 15 Thousands/µL      Immature Grans Absolute 0 02 Thousand/uL      Lymphocytes Absolute 1 72 Thousands/µL      Monocytes Absolute 1 27 (H) Thousand/µL      Eosinophils Absolute 0 11 Thousand/µL      Basophils Absolute 0 05 Thousands/µL           EKG, Pathology, and Other Studies Reviewed on Admission  Right bundle-branch block, TWI inferolateral leads seen on previous EKGs in the past     Total time spent in the process of admission, completion of records, counseling, coordination of care, discussion with patient/family was approximately 35 minutes  Ariana Quiles MD  HOSPITALIST SERVICES  1/23/2019      PLEASE NOTE:  This encounter was completed utilizing the [employer] Direct Speech Voice Recognition Software  Grammatical errors, random word insertions, pronoun errors and incomplete sentences are occasional consequences of the system due to software limitations, ambient noise and hardware issues  These may be missed by proof reading prior to affixing electronic signature  Any questions or concerns about the content, text or information contained within the body of this dictation should be directly addressed to the physician for clarification  Please do not hesitate to call me directly if you have any any questions or concerns

## 2019-01-23 NOTE — CONSULTS
Consultation - Cardiology Team One  Faustino Rendon 80 y o  female MRN: [id number]  Unit/Bed#: ED 20 Encounter: [id number]    Inpatient consult to Cardiology  Consult performed by: Basilia Doyle  Consult ordered by: Andrew Cordero          Physician Requesting Consult: Kenia Germain MD  Reason for Consult / Principal Problem: NSTEMI, hx of CAD    HPI: Cardiologist Dr Severjazmine Stockton is a 80y o  year old female who has a history of CAD s/p stents, complete A-V block s/p dual chamber pm, CVA, HTN, HLD presents to the ED with substernal chest pain and abdominal pain for about 2 days per her assisted living facility  Per chart she had been complaining of upper abdominal pain along with rib pain, denies any vomiting or diarrhea  Mostly non-verbal but denies chest pain at this time  Sees Dr Opal Wooten as outpatient and has been doing reasonably well from a cardiac standpoint, recently received device check one month ago and showed normally functioning device  Per daughter Gail Juarez she has not been eating well and is now only soft food tolerant  Spoke regarding expectations of care given advanced age and family would not like to receive extensive or invasive procedures at this time  REVIEW OF SYSTEMS: Limited     Historical Information   Past Medical History:   Diagnosis Date    Aphasia as late effect of cerebrovascular accident     Arthritis     Atrioventricular block, complete ([address] )     CAD (coronary artery disease)     remote stenting    Cardiac pacemaker in situ 12/09/2009    Cerebral infarct ([facility] )     due to unspecified occlusion and stenosis of unspecified cerebral artery    Diabetes ([facility] [address] ) 10/28/2018    Disease of thyroid gland     Dysphagia     Essential (primary) hypertension     GERD (gastroesophageal reflux disease)     PUD    Gout     History of echocardiogram 04/27/2015    EF 55%, mild MR, mild TR      Hypothyroidism     Mixed hyperlipidemia     Seizures (HCC)     Shoulder fracture, left     Stroke [facility] )     dysphagia, aphasia    Subarachnoid hemorrhage (HCC)     Wrist fracture, closed, left, with routine healing, subsequent encounter 2003     Past Surgical History:   Procedure Laterality Date   Eitan Roy CARDIAC PACEMAKER PLACEMENT  12/09/2009    [employer]idomin[employer] Edvin PERKINS Model [id number] Serial # [id number]    CARDIAC PACEMAKER PLACEMENT Left 10/23/2018    Procedure: REMOVE AND REPLACE DUAL LEAD PACEMAKER;  Surgeon: Dennis Gallagher MD;  Location: [address] [place] [address] MAIN OR;  Service: Cardiology    CORONARY ANGIOPLASTY WITH STENT PLACEMENT      unsure of date    EYE SURGERY      FRACTURE SURGERY      SKIN BIOPSY       History   Alcohol Use No     History   Drug Use No     History   Smoking Status    Former Smoker    Types: Cigarettes   Smokeless Tobacco    Never Used       Family History:   Family History   Problem Relation Age of Onset    Arthritis Mother     Hearing loss Mother     Diabetes Father     Early death Father    Eitan Roy Hypertension Father     Stroke Father     Vision loss Father     Diabetes Sister     Hearing loss Sister     Heart disease Sister     Hypertension Sister    Silvana Chito / [place] Sister     Stroke Sister     Diabetes Brother     Early death Brother     Hearing loss Brother     Learning disabilities Brother     Vision loss Brother        MEDS & ALLERGIES:  all current active meds have been reviewed and current meds: Current Facility-Administered Medications   Medication Dose Route Frequency    acetaminophen (TYLENOL) tablet 650 mg  650 mg Oral Q6H PRN    aspirin tablet 325 mg  325 mg Oral Daily    cholecalciferol (VITAMIN D3) tablet 2,000 Units  2,000 Units Oral Daily    enoxaparin (LOVENOX) subcutaneous injection 40 mg  40 mg Subcutaneous Daily    iohexol (OMNIPAQUE) 350 MG/ML injection (MULTI-DOSE) 100 mL  100 mL Intravenous Once in imaging    levothyroxine tablet 25 mcg  25 mcg Oral Early Morning    metoprolol tartrate (LOPRESSOR) tablet 12 5 mg  12 5 mg Oral Q12H [facility]    pantoprazole (PROTONIX) EC tablet 40 mg  40 mg Oral Daily    polyvinyl alcohol (LIQUIFILM TEARS) 1 4 % ophthalmic solution 1 drop  1 drop Both Eyes Q12H [facility]        No Known Allergies    OBJECTIVE:  Vitals:   Vitals:    01/23/19 0730   BP: 147/67   Pulse: 63   Resp: 19   Temp:    SpO2: 98%     Body mass index is 19 2 kg/m²  Systolic (92KVT), GRN:757 , Min:147 , TUJ:932     Diastolic (96ZYZ), OKN:85, Min:67, Max:97    No intake or output data in the 24 hours ending 01/23/19 0839  Weight (last 2 days)     Date/Time   Weight    01/22/19 2236  44 6 (98 33)            Invasive Devices     Peripheral Intravenous Line            Peripheral IV 01/22/19 Right Antecubital less than 1 day                PHYSICAL EXAMS:  General:  Patient is not in acute distress, laying in the bed comfortably, awake, alert responding to commands  Head: Normocephalic, Atraumatic  HEENT: White sclera, pink conjunctiva,  PERRLA,pharynx benign  Neck:  Supple, no neck vein distention, carotids+2/+2 no bruits, thyromegaly, adenopathy  Respiratory: clear to P/A  Cardiovascular:  PMI normal, S1-S2 normal, No  Murmurs, thrills, gallops, rubs   Regular rhythm  GI:  Abdomen soft nontender   No hepatosplenomegaly, adenopathy, ascites,or rebound tenderness  Extremities: No edema, normal pulses, no calf tenderness, no joint deformities, no venous disease   Integument:  No skin rashes or ulceration  Lymphatic:  No cervical or inguinal lymphadenopathy  Neurologic:  Patient is awake alert, responding to command, well-oriented to time and place and person moving all extremities    LABORATORY RESULTS:    Results from last 7 days  Lab Units 01/23/19  0540 01/23/19  0213 01/22/19  2258   TROPONIN I ng/mL 0 16* 0 17* 0 22*     CBC with diff:   Results from last 7 days  Lab Units 01/23/19  0540 01/22/19  2258   WBC Thousand/uL 5 92 6 32   HEMOGLOBIN g/dL 12 7 13 9   HEMATOCRIT % 40 8 45 1   MCV fL 84 85   PLATELETS Thousands/uL 181 193   MCH pg 26 1* 26 1*   MCHC g/dL 31 1* 30 8*   RDW % 15 9* 15 9*   MPV fL 10 7 10 6   NRBC AUTO /100 WBCs 0 0       CMP:  Results from last 7 days  Lab Units 19  0540 19  2259   POTASSIUM mmol/L 3 9 4 4   CHLORIDE mmol/L 101 101   CO2 mmol/L 32 34*   BUN mg/dL 19 22   CREATININE mg/dL 0 84 0 87   CALCIUM mg/dL 9 4 9 7   AST U/L  --  16   ALT U/L  --  8*   ALK PHOS U/L  --  111   EGFR ml/min/1 73sq m 58 56       BMP:  Results from last 7 days  Lab Units 19  0540 19  2259   POTASSIUM mmol/L 3 9 4 4   CHLORIDE mmol/L 101 101   CO2 mmol/L 32 34*   BUN mg/dL 19 22   CREATININE mg/dL 0 84 0 87   CALCIUM mg/dL 9 4 9 7                              Lipid Profile:   No results found for: CHOL  No results found for: HDL  No results found for: LDLCALC  No results found for: TRIG    Cardiac testing:   Results for orders placed during the hospital encounter of 18   Echo complete with contrast if indicated    Narrative [address] [place] [address]  [phone]    Transthoracic Echocardiogram  2D, M-mode, Doppler, and Color Doppler    Study date:  24-Aug-2018    Patient: PACHECO QUINTANILLA  MR number: [id number]  Account number: [de-identified]  : 02-May-1921  Age: 80 years  Gender: Female  Status: Inpatient  Location: Bedside  Height: 60 in  Weight: 96 lb  BP: 136/ 64 mmHg    Indications: Syncope and Collapse    Diagnoses: R55  - Syncope and collapse    Sonographer:  CITLALI May,RDCS  Interpreting Physician:  Stuart Lloyd MD  Referring Physician:  Caridad Ochoa PA-C  Group:  [facility]    SUMMARY    LEFT VENTRICLE:  Ejection fraction was estimated to be 55 %  There were no regional wall motion abnormalities  There was mild concentric hypertrophy  There was an increased relative contribution of atrial contraction to ventricular filling  RIGHT VENTRICLE:  Estimated peak pressure was at least 30 mmHg   Pacemaker lead noted     ATRIAL SEPTUM:  The interatrial septum appears aneurysmal     RIGHT ATRIUM:  Size was normal  Likely prominent chiari network  MITRAL VALVE:  There was mild to moderate annular calcification  There was mild regurgitation  AORTIC VALVE:  There was mild regurgitation  TRICUSPID VALVE:  There was mild to moderate regurgitation  AORTA:  Ascending aorta appears dilated  HISTORY: PRIOR HISTORY: Pacemaker, Cerebral Vascular Accident, Hypertension, Syncope, Dementia, Hyperlipidemia, Complete AV Block, Cardiac Murmur, Chest Pain, Coronary Artery Disease    PROCEDURE: The procedure was performed at the bedside  This was a routine study  The transthoracic approach was used  The study included complete 2D imaging, M-mode, complete spectral Doppler, and color Doppler  The heart rate was 68 bpm,  at the start of the study  Images were obtained from the parasternal, apical, subcostal, and suprasternal notch acoustic windows  Image quality was adequate  LEFT VENTRICLE: Size was normal  Ejection fraction was estimated to be 55 %  There were no regional wall motion abnormalities  There was mild concentric hypertrophy  DOPPLER: There was an increased relative contribution of atrial  contraction to ventricular filling  RIGHT VENTRICLE: The size was normal  Systolic function was normal  Wall thickness was normal  DOPPLER: Estimated peak pressure was at least 30 mmHg  Pacemaker lead noted  LEFT ATRIUM: Size was normal     ATRIAL SEPTUM: The interatrial septum appears aneurysmal     RIGHT ATRIUM: Size was normal  Likely prominent chiari network  MITRAL VALVE: There was mild to moderate annular calcification  Valve structure was normal  There was normal leaflet separation  DOPPLER: The transmitral velocity was within the normal range  There was no evidence for stenosis  There was  mild regurgitation  AORTIC VALVE: The valve was trileaflet  Leaflets exhibited mild calcification   DOPPLER: There was mild regurgitation  TRICUSPID VALVE: The valve structure was normal  There was normal leaflet separation  DOPPLER: The transtricuspid velocity was within the normal range  There was no evidence for stenosis  There was mild to moderate regurgitation  PULMONIC VALVE: Leaflets exhibited normal thickness, no calcification, and normal cuspal separation  DOPPLER: The transpulmonic velocity was within the normal range  There was no regurgitation  PERICARDIUM: There was no pericardial effusion  The pericardium was normal in appearance  AORTA: The root exhibited normal size  Ascending aorta appears dilated  SYSTEM MEASUREMENT TABLES    2D  %FS: 31 2 %  Ao Diam: 3 4 cm  EDV(Teich): 41 6 ml  EF(Teich): 60 3 %  ESV(Teich): 16 5 ml  IVSd: 1 cm  LA Area: 14 2 cm2  LA Diam: 2 5 cm  LVEDV MOD A4C: 58 5 ml  LVEF MOD A4C: 60 5 %  LVESV MOD A4C: 23 1 ml  LVIDd: 3 2 cm  LVIDs: 2 2 cm  LVLd A4C: 7 1 cm  LVLs A4C: 6 1 cm  LVOT Diam: 1 9 cm  LVPWd: 1 cm  RA Area: 12 4 cm2  RVIDd: 3 1 cm  SV MOD A4C: 35 4 ml  SV(Teich): 25 1 ml    CW  AR Dec [place]: 3 4 m/s2  AR Dec Time: 1377 3 ms  AR PHT: 399 4 ms  AR Vmax: 4 6 m/s  AR maxP 2 mmHg  AV Env  Ti: 334 9 ms  AV VTI: 33 5 cm  AV Vmax: 1 5 m/s  AV Vmean: 1 m/s  AV maxP mmHg  AV meanP 8 mmHg  MR VTI: 137 3 cm  MR Vmax: 5 5 m/s  MR Vmean: 3 8 m/s  MR maxP 6 mmHg  MR meanP 5 mmHg  TR Vmax: 2 7 m/s  TR maxP 2 mmHg    MM  TAPSE: 1 5 cm    PW  DARIUS (VTI): 2 cm2  DARIUS Vmax: 2 1 cm2  E': 0 m/s  E/E': 20 3  LVOT Env  Ti: 296 9 ms  LVOT VTI: 22 7 cm  LVOT Vmax: 1 1 m/s  LVOT Vmean: 0 8 m/s  LVOT maxP 5 mmHg  LVOT meanP 6 mmHg  LVSV Dopp: 66 6 ml  MV A Daniel: 1 m/s  MV Dec [place]: 1 5 m/s2  MV DecT: 269 7 ms  MV E Daniel: 0 4 m/s  MV E/A Ratio: 0 4  MV PHT: 78 2 ms  MVA By PHT: 2 8 cm2    [employer] Accredited Echocardiography Laboratory    Prepared and electronically signed by    Sourav Zamora MD  Signed 24-Aug-2018 14:49:47         Telemetry reviewed personally:   Pacer rhythm    Assessment/Plan:  1- NSTEMI likely type 2, type 1 cannot be completely ruled out  Troponins peaked at 0 17   No active chest pain  No ischemic EKG changes  CTA chest shows esophageal debris, at risk for aspiration  Echocardiogram from 08/24/2018 shows EF 55%, mild LVH, RV peak pressure 30mmHg, aneurysmal atrial septum, RA shows prominent chiari network, moderate mitral valve annular calcification, mild MR, AR, mild-to-moderate TR  Would not pursue ischemic w/u given advanced age and altered mental status  Family agreeable with plan  No further cardiac w/u is necessary at this time  2- CAD s/p stents  Stable, continue to lopressor and ASA    3- Hypertension  BP on the higher side, continue to monitor    4 - Complete atrioventricular block s/p dual chamber pm  Received generator change 10/23/18    5- 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

## 2019-01-23 NOTE — ED PROVIDER NOTES
History  Chief Complaint   Patient presents with    Abdominal Pain     [address] [place] [address] called EMS bc pt c/o abdominal pain  pt denies n/v/d      HPI  80 y o  Female with a history of aphasia secondary to a prior CVA presents with possible 2 days of substernal chest pain and upper abdominal pain  The patient unable to provide significant information due to her aphasia  The reliability of the limited history we have is questionable  Per the patient's personal home, she has been complaining of upper abdominal pain and rib pain  They denied any vomiting or diarrhea  Per the medical record, patient takes aspirin daily  Medical Decision Making  Impression and plan:  Possible abdominal or chest pain  Per the medical record, patient has a history of aortic aneurysm  Obtain chest pain evaluation considering possible chest pain along with abdominal evaluation  Will obtain CTA dissection protocol to evaluate patient's chest and abdomen including aortic arch considering prior history of aortic aneurysm  Will monitor and reassess  Reassessment:  Patient's troponin notably elevated  The patient's EKG unremarkable for ST elevation  Unclear if this represents type 1 or type 2 NSTEMI  Will admit patient for continued monitoring and evaluation  Patient takes aspirin daily  Will continue patient on cardiac monitoring  Abdominal Pain       Prior to Admission Medications   Prescriptions Last Dose Informant Patient Reported? Taking?    Acetaminophen 325 MG CAPS   Yes Yes   Sig: Take 650 mg by mouth every 6 (six) hours as needed   Cholecalciferol 2000 units TABS   Yes Yes   Sig: Take 1 tablet by mouth daily   Multiple Vitamin (DAILY-JOSE LUIS PO)   Yes Yes   Sig: Take 1 tablet by mouth   Nutritional Supplements (ENSURE ACTIVE PO)   Yes Yes   Sig: Take by mouth daily   aspirin 325 mg tablet   Yes Yes   Sig: Take 325 mg by mouth daily   cycloSPORINE (RESTASIS) 0 05 % ophthalmic emulsion   Yes Yes   Sig: Administer 1 drop to both eyes 2 (two) times a day   levothyroxine 25 mcg tablet   Yes Yes   Sig: Take 25 mcg by mouth daily   metoprolol tartrate (LOPRESSOR) 25 mg tablet   No Yes   Sig: Take 0 5 tablets (12 5 mg total) by mouth every 12 (twelve) hours   pantoprazole (PROTONIX) 40 mg tablet   Yes Yes   Sig: Take 40 mg by mouth daily   polyvinyl alcohol (LIQUIFILM TEARS) 1 4 % ophthalmic solution   Yes Yes   Sig: Administer 2 drops to both eyes daily      Facility-Administered Medications: None       Past Medical History:   Diagnosis Date    Aphasia as late effect of cerebrovascular accident     Arthritis     Atrioventricular block, complete ([address] )     CAD (coronary artery disease)     remote stenting    Cardiac pacemaker in situ 12/09/2009    Cerebral infarct [facility] )     due to unspecified occlusion and stenosis of unspecified cerebral artery    Diabetes ([facility] [facility] ) 10/28/2018    Disease of thyroid gland     Dysphagia     Essential (primary) hypertension     GERD (gastroesophageal reflux disease)     PUD    Gout     History of echocardiogram 04/27/2015    EF 55%, mild MR, mild TR      Hypothyroidism     Mixed hyperlipidemia     Seizures (HCC)     Shoulder fracture, left     Stroke (HCC)     dysphagia, aphasia    Subarachnoid hemorrhage (HCC)     Wrist fracture, closed, left, with routine healing, subsequent encounter 2003       Past Surgical History:   Procedure Laterality Date   Leann Rdz CARDIAC PACEMAKER PLACEMENT  12/09/2009    [address] DR Model [id number] Serial # [id number]    CARDIAC PACEMAKER PLACEMENT Left 10/23/2018    Procedure: REMOVE AND REPLACE DUAL LEAD PACEMAKER;  Surgeon: Justino Bellamy MD;  Location: [facility] MAIN OR;  Service: Cardiology    CORONARY ANGIOPLASTY WITH STENT PLACEMENT      unsure of date    EYE SURGERY      FRACTURE SURGERY      SKIN BIOPSY         Family History   Problem Relation Age of Onset    Arthritis Mother     Hearing loss Mother     Diabetes Father     Early death Father     Hypertension Father     Stroke Father     Vision loss Father     Diabetes Sister     Hearing loss Sister     Heart disease Sister     Hypertension Sister     Miscarriages / [place] Sister     Stroke Sister     Diabetes Brother     Early death Brother     Hearing loss Brother     Learning disabilities Brother     Vision loss Brother      I have reviewed and agree with the history as documented  Social History   Substance Use Topics    Smoking status: Former Smoker     Types: Cigarettes    Smokeless tobacco: Never Used    Alcohol use No        Review of Systems   Unable to perform ROS: Other (Aphasia  At baseline)   Gastrointestinal: Positive for abdominal pain  Physical Exam  Physical Exam   Constitutional: She appears well-developed and well-nourished  No distress  HENT:   Head: Normocephalic and atraumatic  Eyes: Pupils are equal, round, and reactive to light  Neck: Normal range of motion  Neck supple  Cardiovascular: Normal rate and regular rhythm  Exam reveals no friction rub  No murmur heard  Pulmonary/Chest: Effort normal    Abdominal: Soft  She exhibits no distension and no mass  There is no tenderness  There is no guarding  Musculoskeletal: She exhibits no tenderness or deformity  No clinical signs of DVT  Neurological: She is alert  Skin: Skin is warm and dry  She is not diaphoretic  Psychiatric: She has a normal mood and affect  Vitals reviewed        Vital Signs  ED Triage Vitals   Temperature Pulse Respirations Blood Pressure SpO2   01/22/19 2236 01/22/19 2236 01/22/19 2236 01/22/19 2236 01/22/19 2236   98 1 °F (36 7 °C) 68 16 (!) 171/97 93 %      Temp Source Heart Rate Source Patient Position - Orthostatic VS BP Location FiO2 (%)   01/22/19 2236 01/22/19 2345 01/22/19 2345 01/22/19 2345 --   Oral Monitor Lying Right arm       Pain Score       01/23/19 0115       No Pain           Vitals:    01/23/19 1615 01/23/19 2259 01/23/19 2322 01/24/19 0312   BP: 162/81  147/75 134/64   Pulse: 66 61 60 62   Patient Position - Orthostatic VS:   Lying Lying       Visual Acuity  Visual Acuity      Most Recent Value   L Pupil Size (mm)  3   R Pupil Size (mm)  3   L Pupil Shape  Round   R Pupil Shape  Round          ED Medications  Medications   iohexol (OMNIPAQUE) 350 MG/ML injection (MULTI-DOSE) 100 mL (not administered)   aspirin tablet 325 mg (325 mg Oral Given 1/23/19 0852)   cholecalciferol (VITAMIN D3) tablet 2,000 Units (2,000 Units Oral Given 1/23/19 0852)   polyvinyl alcohol (LIQUIFILM TEARS) 1 4 % ophthalmic solution 1 drop (1 drop Both Eyes Not Given 1/24/19 0048)   levothyroxine tablet 25 mcg (25 mcg Oral Given 1/23/19 0647)   metoprolol tartrate (LOPRESSOR) tablet 12 5 mg (12 5 mg Oral Given 1/23/19 2259)   pantoprazole (PROTONIX) EC tablet 40 mg (40 mg Oral Given 1/23/19 0852)   enoxaparin (LOVENOX) subcutaneous injection 40 mg (40 mg Subcutaneous Given 1/23/19 0858)   acetaminophen (TYLENOL) tablet 650 mg (not administered)   sodium chloride 0 9 % infusion (75 mL/hr Intravenous Rate/Dose Change 1/23/19 1847)   iohexol (OMNIPAQUE) 350 MG/ML injection (MULTI-DOSE) 100 mL (100 mL Intravenous Given 1/22/19 2079)       Diagnostic Studies  Results Reviewed     Procedure Component Value Units Date/Time    Troponin I [[id number]]  (Abnormal) Collected:  01/23/19 1147    Lab Status:  Final result Specimen:  Blood from Arm, Right Updated:  01/23/19 1217     Troponin I 0 10 (H) ng/mL     Troponin I [[id number]]  (Abnormal) Collected:  01/23/19 0843    Lab Status:  Final result Specimen:  Blood from Arm, Right Updated:  01/23/19 0913     Troponin I 0 14 (H) ng/mL     Troponin I [[id number]]  (Abnormal) Collected:  01/23/19 0540    Lab Status:  Final result Specimen:  Blood from Arm, Right Updated:  01/23/19 0626     Troponin I 0 16 (H) ng/mL     Basic metabolic panel [[id number]] Collected:  01/23/19 0540    Lab Status:  Final result Specimen:  Blood from Arm, Right Updated:  01/23/19 0616     Sodium 139 mmol/L      Potassium 3 9 mmol/L      Chloride 101 mmol/L      CO2 32 mmol/L      ANION GAP 6 mmol/L      BUN 19 mg/dL      Creatinine 0 84 mg/dL      Glucose 83 mg/dL      Calcium 9 4 mg/dL      eGFR 58 ml/min/1 73sq m     Narrative:         National Kidney Disease Education Program recommendations are as follows:  GFR calculation is accurate only with a steady state creatinine  Chronic Kidney disease less than 60 ml/min/1 73 sq  meters  Kidney failure less than 15 ml/min/1 73 sq  meters  CBC and differential [[id number]]  (Abnormal) Collected:  01/23/19 0540    Lab Status:  Final result Specimen:  Blood from Arm, Right Updated:  01/23/19 0558     WBC 5 92 Thousand/uL      RBC 4 87 Million/uL      Hemoglobin 12 7 g/dL      Hematocrit 40 8 %      MCV 84 fL      MCH 26 1 (L) pg      MCHC 31 1 (L) g/dL      RDW 15 9 (H) %      MPV 10 7 fL      Platelets 661 Thousands/uL      nRBC 0 /100 WBCs      Neutrophils Relative 50 %      Immat GRANS % 0 %      Lymphocytes Relative 25 %      Monocytes Relative 22 (H) %      Eosinophils Relative 2 %      Basophils Relative 1 %      Neutrophils Absolute 3 00 Thousands/µL      Immature Grans Absolute 0 02 Thousand/uL      Lymphocytes Absolute 1 46 Thousands/µL      Monocytes Absolute 1 31 (H) Thousand/µL      Eosinophils Absolute 0 10 Thousand/µL      Basophils Absolute 0 03 Thousands/µL     Troponin I [[id number]]  (Abnormal) Collected:  01/23/19 0213    Lab Status:  Final result Specimen:  Blood from Arm, Right Updated:  01/23/19 0238     Troponin I 0 17 (H) ng/mL     Lactic acid, plasma [[id number]]  (Normal) Collected:  01/22/19 2258    Lab Status:  Final result Specimen:  Blood from Arm, Right Updated:  01/22/19 2331     LACTIC ACID 0 8 mmol/L     Narrative:         Result may be elevated if tourniquet was used during collection      Troponin I [[id number]]  (Abnormal) Collected:  01/22/19 2258    Lab Status:  Final result Specimen: Blood from Arm, Right Updated:  01/22/19 2330     Troponin I 0 22 (H) ng/mL     CMP [[id number]]  (Abnormal) Collected:  01/22/19 2259    Lab Status:  Final result Specimen:  Blood from Arm, Right Updated:  01/22/19 2326     Sodium 140 mmol/L      Potassium 4 4 mmol/L      Chloride 101 mmol/L      CO2 34 (H) mmol/L      ANION GAP 5 mmol/L      BUN 22 mg/dL      Creatinine 0 87 mg/dL      Glucose 87 mg/dL      Calcium 9 7 mg/dL      AST 16 U/L      ALT 8 (L) U/L      Alkaline Phosphatase 111 U/L      Total Protein 7 5 g/dL      Albumin 3 3 (L) g/dL      Total Bilirubin 0 50 mg/dL      eGFR 56 ml/min/1 73sq m     Narrative:         National Kidney Disease Education Program recommendations are as follows:  GFR calculation is accurate only with a steady state creatinine  Chronic Kidney disease less than 60 ml/min/1 73 sq  meters  Kidney failure less than 15 ml/min/1 73 sq  meters      Lipase [[id number]]  (Abnormal) Collected:  01/22/19 2259    Lab Status:  Final result Specimen:  Blood from Arm, Right Updated:  01/22/19 2326     Lipase 405 (H) u/L     CBC and differential [[id number]]  (Abnormal) Collected:  01/22/19 2258    Lab Status:  Final result Specimen:  Blood from Arm, Right Updated:  01/22/19 2306     WBC 6 32 Thousand/uL      RBC 5 33 (H) Million/uL      Hemoglobin 13 9 g/dL      Hematocrit 45 1 %      MCV 85 fL      MCH 26 1 (L) pg      MCHC 30 8 (L) g/dL      RDW 15 9 (H) %      MPV 10 6 fL      Platelets 696 Thousands/uL      nRBC 0 /100 WBCs      Neutrophils Relative 50 %      Immat GRANS % 0 %      Lymphocytes Relative 27 %      Monocytes Relative 20 (H) %      Eosinophils Relative 2 %      Basophils Relative 1 %      Neutrophils Absolute 3 15 Thousands/µL      Immature Grans Absolute 0 02 Thousand/uL      Lymphocytes Absolute 1 72 Thousands/µL      Monocytes Absolute 1 27 (H) Thousand/µL      Eosinophils Absolute 0 11 Thousand/µL      Basophils Absolute 0 05 Thousands/µL                  CTA dissection protocol chest and abdomen   Final Result by Chelsey Campo MD (01/23 0049)      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 abdominal aortic aneurysms measuring 4 9 and 4 1 cm respectively  Extensive debris within the esophagus, which places the patient at risk for aspiration  Workstation performed: [id number]                    Procedures  Procedures       Phone Contacts  ED Phone Contact    ED Course  ED Course as of Jan 24 0422   Tue Jan 22, 2019   0628 EKG demonstrates normal sinus rhythm with no acute ST segment changes  There is flattening of multiple T-waves with inversion and multiple leads that is consistent with the prior EKG  This is somewhat improved in the lead 2                                   MDM  CritCare Time    Disposition  Final diagnoses:   Elevated troponin   Chest pain   Abdominal pain     Time reflects when diagnosis was documented in both MDM as applicable and the Disposition within this note     Time User Action Codes Description Comment    1/23/2019  1:33 AM Erika Turner Add [I25 10] Coronary artery disease involving native coronary artery of native heart without angina pectoris     1/23/2019  1:33 AM Erika Turner Modify [I25 10] Coronary artery disease involving native coronary artery of native heart without angina pectoris     1/23/2019  8:07 AM Erlene West Rupert Add [R74 8] Elevated troponin     1/23/2019  8:07 AM Erlene West Rupert Add [I21 4] NSTEMI (non-ST elevated myocardial infarction) ([facility] [address] )     1/23/2019  8:07 AM Erlene West Rupert Remove [I21 4] NSTEMI (non-ST elevated myocardial infarction) ([facility] [address] )     1/23/2019  8:07 AM Erlene West Rupert Add [R07 9] Chest pain     1/23/2019  8:07 AM Erlene West Rupert Add [R10 9] Abdominal pain       ED Disposition     ED Disposition Condition Comment    Admit  Case was discussed with SHIRLEY and the patient's admission status was agreed to be Admission Status: obs status to the service of Dr Meagan Novoa None         Current Discharge Medication List      CONTINUE these medications which have NOT CHANGED    Details   Acetaminophen 325 MG CAPS Take 650 mg by mouth every 6 (six) hours as needed      aspirin 325 mg tablet Take 325 mg by mouth daily      Cholecalciferol 2000 units TABS Take 1 tablet by mouth daily      cycloSPORINE (RESTASIS) 0 05 % ophthalmic emulsion Administer 1 drop to both eyes 2 (two) times a day      levothyroxine 25 mcg tablet Take 25 mcg by mouth daily      metoprolol tartrate (LOPRESSOR) 25 mg tablet Take 0 5 tablets (12 5 mg total) by mouth every 12 (twelve) hours  Qty: 60 tablet, Refills: 0    Associated Diagnoses: Ascending aortic aneurysm (HCC)      Multiple Vitamin (DAILY-JOSE LUIS PO) Take 1 tablet by mouth      Nutritional Supplements (ENSURE ACTIVE PO) Take by mouth daily      pantoprazole (PROTONIX) 40 mg tablet Take 40 mg by mouth daily      polyvinyl alcohol (LIQUIFILM TEARS) 1 4 % ophthalmic solution Administer 2 drops to both eyes daily           No discharge procedures on file      ED Provider  Electronically Signed by           Marcin Gaming MD  01/24/19 7983

## 2019-01-23 NOTE — ASSESSMENT & PLAN NOTE
· She has mildly elevated Troponins of undetermined significance  · She has a history of pacemaker placement and prior PCI for CAD remotely  · Appreciate cardiology input  Given advanced age and dementia, conservative management recommended  No plan for further work up  · On Asa and Metoprolol

## 2019-01-23 NOTE — PHYSICIAN ADVISOR
Current patient class: Observation  The patient is currently on Hospital Day: 2 at [address]        The patient was admitted to the hospital  on N/A at N/A for the following diagnosis:  Abdominal pain [R10 9]     After review of the relevant documentation, labs, vital signs and test results, the patient is most appropriate for OBSERVATION STATUS  Rationale is as follows: The patient is a 80 yrs   Female who presented to the ED at 1/22/2019 10:31 PM with a chief complaint of Abdominal Pain ([facility] called EMS bc pt c/o abdominal pain  pt denies n/v/d )   The patient is an elderly 49-year-old female who presented with abdominal pain  She had mild elevation in troponin  She was admitted for further evaluation  Cardiology was consulted  They feel there is no indication for further testing  Medical management  Otherwise she is on p o  Meds, home medications  Vital signs are stable  CT of the abdomen and pelvis is unremarkable  Given these considerations and after review of the chart she is appropriate for observation level of care      The patients vitals on arrival were ED Triage Vitals   Temperature Pulse Respirations Blood Pressure SpO2   01/22/19 2236 01/22/19 2236 01/22/19 2236 01/22/19 2236 01/22/19 2236   98 1 °F (36 7 °C) 68 16 (!) 171/97 93 %      Temp Source Heart Rate Source Patient Position - Orthostatic VS BP Location FiO2 (%)   01/22/19 2236 01/22/19 2345 01/22/19 2345 01/22/19 2345 --   Oral Monitor Lying Right arm       Pain Score       01/23/19 0115       No Pain           Past Medical History:   Diagnosis Date    Aphasia as late effect of cerebrovascular accident     Arthritis     Atrioventricular block, complete ([address] )     CAD (coronary artery disease)     remote stenting    Cardiac pacemaker in situ 12/09/2009    Cerebral infarct ([address] )     due to unspecified occlusion and stenosis of unspecified cerebral artery    Diabetes ([address] ) 10/28/2018    Disease of thyroid gland     Dysphagia     Essential (primary) hypertension     GERD (gastroesophageal reflux disease)     PUD    Gout     History of echocardiogram 04/27/2015    EF 55%, mild MR, mild TR   Hypothyroidism     Mixed hyperlipidemia     Seizures (HCC)     Shoulder fracture, left     Stroke ([address] )     dysphagia, aphasia    Subarachnoid hemorrhage (HCC)     Wrist fracture, closed, left, with routine healing, subsequent encounter 2003     Past Surgical History:   Procedure Laterality Date   [facility] CARDIAC PACEMAKER PLACEMENT  12/09/2009    [employer]vin DR Model [id number] Serial # [id number]    CARDIAC PACEMAKER PLACEMENT Left 10/23/2018    Procedure: REMOVE AND REPLACE DUAL LEAD PACEMAKER;  Surgeon: Francesco Martinez MD;  Location: [facility] MAIN OR;  Service: Cardiology    CORONARY ANGIOPLASTY WITH STENT PLACEMENT      unsure of date    EYE SURGERY      FRACTURE SURGERY      SKIN BIOPSY             Consults have been placed to:   IP CONSULT TO CARDIOLOGY    Vitals:    01/23/19 0815 01/23/19 1000 01/23/19 1030 01/23/19 1330   BP:  159/68 161/72 132/62   BP Location:   Right arm Right arm   Pulse: 62 65 60 62   Resp: 21 18 18 13   Temp:       TempSrc:       SpO2: 98% 98% 98% 98%   Weight:           Most recent labs:    Recent Labs      01/22/19   2259   01/23/19   0540   01/23/19   1147   WBC   --    --   5 92   --    --    HGB   --    --   12 7   --    --    HCT   --    --   40 8   --    --    PLT   --    --   181   --    --    K  4 4   --   3 9   --    --    CALCIUM  9 7   --   9 4   --    --    BUN  22 -- 19   --    --    CREATININE  0 87   --   0 84   --    --    LIPASE  405*   --    --    --    --    TROPONINI   --    < >  0 16*   < >  0 10*   AST  16   --    --    --    --    ALT  8*   --    --    --    --    ALKPHOS  111   --    --    --    --     < > = values in this interval not displayed         Scheduled Meds:  Current Facility-Administered Medications:  acetaminophen 650 mg Oral Q6H PRN Romy Donaldson MD    aspirin 325 mg Oral Daily Erika Turner MD    cholecalciferol 2,000 Units Oral Daily Erika Turner MD    enoxaparin 40 mg Subcutaneous Daily Erika Turner MD    iohexol 100 mL Intravenous Once in imaging Kourtney Bennett MD    levothyroxine 25 mcg Oral Early Morning Erika Turner MD    metoprolol tartrate 12 5 mg Oral Q12H [address] Erika Turner MD    pantoprazole 40 mg Oral Daily Erika Turner MD    polyvinyl alcohol 1 drop Both Eyes Q12H [address] Erika Turner MD    sodium chloride 50 mL/hr Intravenous Continuous Subha Ahuja PA-C Last Rate: 50 mL/hr (01/23/19 1421)     Continuous Infusions:  sodium chloride 50 mL/hr Last Rate: 50 mL/hr (01/23/19 1421)     PRN Meds:   acetaminophen    iohexol    Surgical procedures (if appropriate):

## 2019-01-23 NOTE — UTILIZATION REVIEW
Initial Clinical Review    Admission: Date/Time/Statement: OBSERVATION 1/23/19 @0058  Orders Placed This Encounter   Procedures    Place in Observation (expected length of stay for this patient is less than two midnights)     Standing Status:   Standing     Number of Occurrences:   1     Order Specific Question:   Admitting Physician     Answer:   Conchita White [[id number]]     Order Specific Question:   Level of Care     Answer:   Med Surg [16]     ED: Date/Time/Mode of Arrival:   ED Arrival Information     Expected Arrival Acuity Means of Arrival Escorted By Service Admission Type    - 1/22/2019 22:31 Urgent Ambulance SLEIMELDA Khan) General Medicine Urgent    Arrival Complaint    ABD PAIN        Chief Complaint:   Chief Complaint   Patient presents with    Abdominal Pain     [facility] called EMS bc pt c/o abdominal pain  pt denies n/v/d      History of Illness: 79 yo fem to ED by EMS from personal care home due to LUQ agb pain, substernal chest pain x 1-2 days  Limited hx due to aphasia and dementia  ED Vital Signs:   ED Triage Vitals   Temperature Pulse Respirations Blood Pressure SpO2   01/22/19 2236 01/22/19 2236 01/22/19 2236 01/22/19 2236 01/22/19 2236   98 1 °F (36 7 °C) 68 16 (!) 171/97 93 %      Temp Source Heart Rate Source Patient Position - Orthostatic VS BP Location FiO2 (%)   01/22/19 2236 01/22/19 2345 01/22/19 2345 01/22/19 2345 --   Oral Monitor Lying Right arm       Pain Score       01/23/19 0115       No Pain        Wt Readings from Last 1 Encounters:   01/22/19 44 6 kg (98 lb 5 2 oz)     Vital Signs (abnormal): RR 29, 24      174/73  Pertinent Labs/Diagnostic Test Results:   co2 34   Alt 8   Alb 3 3   Lipase 405      Trop  22    17    16      EKG: RBBB, inferolateral TWI  CTA dissection protocol: 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 abdominal aortic aneurysms measuring 4 9 and 4 1 cm respectively  Extensive debris within the esophagus, which places the patient at risk for aspiration  ED Treatment:   Medication Administration from 01/22/2019 2230 to 01/23/2019 0854       Date/Time Order Dose Route Action     01/23/2019 0852 aspirin tablet 325 mg 325 mg Oral Given     01/23/2019 0852 cholecalciferol (VITAMIN D3) tablet 2,000 Units 2,000 Units Oral Given     01/23/2019 0647 levothyroxine tablet 25 mcg 25 mcg Oral Given     01/23/2019 0852 metoprolol tartrate (LOPRESSOR) tablet 12 5 mg 12 5 mg Oral Given     01/23/2019 0852 pantoprazole (PROTONIX) EC tablet 40 mg 40 mg Oral Given        Past Medical/Surgical History:    Active Ambulatory Problems     Diagnosis Date Noted    Mixed hyperlipidemia 08/15/2018    Hypothyroidism 08/15/2018    Complete atrioventricular block ([address] ) 08/15/2018    Cardiac pacemaker in situ 08/15/2018    CVA (cerebral vascular [address]) [address] [address] 08/15/2018    Cardiac murmur 08/15/2018    Dyslipidemia 08/15/2018    Hypertension 08/15/2018    Dementia 08/22/2018    Closed fracture of right iliac wing ([address] ) 09/28/2018    Fall from bed 09/28/2018    Gastroesophageal reflux disease without esophagitis 09/28/2018    Leukemoid reaction 09/28/2018    Diabetes ([address] ) 10/28/2018    CAD (coronary artery disease) 11/26/2018     Resolved Ambulatory Problems     Diagnosis Date Noted    Syncope 08/22/2018     Past Medical History:   Diagnosis Date    Aphasia as late effect of cerebrovascular accident     Arthritis     Atrioventricular block, complete ([address] )     CAD (coronary artery disease)     Cardiac pacemaker in situ 12/09/2009    Cerebral infarct ([address] )     Diabetes ([address] ) 10/28/2018    Disease of thyroid gland     Dysphagia     Essential (primary) hypertension     GERD (gastroesophageal reflux disease)     Gout     History of echocardiogram 04/27/2015    Hypothyroidism     Mixed hyperlipidemia     Seizures (HCC)     Shoulder fracture, left     Stroke ([address] )     Subarachnoid hemorrhage (HCC)     Wrist fracture, closed, left, with routine healing, subsequent encounter 2003     Admitting Diagnosis: Abdominal pain [R10 9]  Age/Sex: 80 y o  female  Assessment/Plan:   79 yo fem to ED by EMS from personal care home admitted under observation due to NSTEMI  (Please note below-cardiology does not feel this is a NSTEMI; undetermined cause of troponin elevation )  * NSTEMI (non-ST elevated myocardial infarction) [facility])   Assessment & Plan     Patient presenting with acute left upper quadrant/left rib area pain and found to have a troponin of 0 22  She is a poor historian given dementia and expressive aphasia from previous CVA  EKG does not show any acute ST changes  She does have T-wave inversions in the inferoolateral leads which are not new   -plan to admit patient for ACS rule out  Will cycle troponins  Will consult Cardiology in the morning  Continue home aspirin 325 mg daily  Continue home metoprolol 12 5 mg b i d   NPO pending cardiology eval           Admission Orders:  Scheduled Meds:   Current Facility-Administered Medications:  acetaminophen 650 mg Oral Q6H PRN   aspirin 325 mg Oral Daily   cholecalciferol 2,000 Units Oral Daily   enoxaparin 40 mg Subcutaneous Daily   iohexol 100 mL Intravenous Once in imaging   levothyroxine 25 mcg Oral Early Morning   metoprolol tartrate 12 5 mg Oral Q12H WANG   pantoprazole 40 mg Oral Daily   polyvinyl alcohol 1 drop Both Eyes Q12H [facility]     TELE  ekg PRN CHEST PAIN  SCD'S  FALL PREC/ASPIRATION PREC  Trop q3h  NPO  Cons cardio  Cons PT/OT      Per cardio 0/65 @6168:   Complicated [id number] female with a history of complete heart block status post permanent pacemaker, coronary artery disease status post remote PCI, dementia any facial secondary to cerebral vascular accident now presenting with reported abdominal pain  Patient cannot describe symptoms secondary to her aphasia  Workup in the emergency room included CT dissection protocol which showed a stable aortic aneurysm and debris in the esophagus    On further questioning  The patient is able to communicate that she does not have any chest pain or abdominal pain  She does complain of a headache as well as some difficulty breathing  EKG shows a normal sinus rhythm with appropriate axis, no evidence of ischemia or dynamic changes  Troponin was checked and is 0 22, now 0 17  Lipase is 455  Elevated troponin of undetermined significance and etiology  Possible pancreatitis  Esophageal dysmotility with debris in the esophagus  Dementia and aphasia     Patient's troponin elevation is at this time undetermined  Favor conservative management in this 80year-old  No role for invasive angiography or echocardiography, as this would not change our management  With focus on pain and symptom control, possibly GI involvement for esophageal debris/dysmotility  Relative benefit of heparin in this case is also questionable  Decrease aspirin to 81 mg daily

## 2019-01-23 NOTE — ASSESSMENT & PLAN NOTE
Patient presenting with acute left upper quadrant/left rib area pain and found to have a troponin of 0 22  She is a poor historian given dementia and expressive aphasia from previous CVA  EKG does not show any acute ST changes  She does have T-wave inversions in the inferoolateral leads which are not new   -plan to admit patient for ACS rule out  Will cycle troponins  Will consult Cardiology in the morning  Continue home aspirin 325 mg daily    Continue home metoprolol 12 5 mg b i d   NPO pending cardiology eval

## 2019-01-23 NOTE — ASSESSMENT & PLAN NOTE
· CTA notes debris in the esophagus concerning for aspiration risk and esophageal dysmotility  · Prior CT Scan also showed a dilated, fluid filled esophagus - I am concerned about possible Achalasia in this patient with a progressive dysphagia to the point of her not tolerating a diet safely  · Per records, patient's daughter Allison Batres reported that she has not been eating well and seems to tolerate only some soft foods  · Currently, patient is NPO and on gentle IVFs  · I tried calling the daughter numerous times without answer to discuss goals of care today: would she want a further work up and management of the dysphagia with EGD with possibility for treatment with botox injection of potential achalasia versus conservative measures/more of a hospice approach with plan to continue soft foods with known risk of aspiration, etc  (she is 80 with dementia, CVA with aphasia, appears cachetic/underweight, etc )

## 2019-01-23 NOTE — ASSESSMENT & PLAN NOTE
· CTA notes subacute to chronic fractures seen at the right 4th, 5th and 6th ribs  · Fall precautions

## 2019-01-24 ENCOUNTER — HOSPITAL ENCOUNTER (EMERGENCY)
Facility: HOSPITAL | Age: 84
Discharge: HOME | End: 2019-01-25
Attending: EMERGENCY MEDICINE | Admitting: EMERGENCY MEDICINE
Payer: MEDICARE

## 2019-01-24 VITALS
OXYGEN SATURATION: 92 % | WEIGHT: 93 LBS | DIASTOLIC BLOOD PRESSURE: 79 MMHG | BODY MASS INDEX: 16.48 KG/M2 | RESPIRATION RATE: 18 BRPM | SYSTOLIC BLOOD PRESSURE: 141 MMHG | HEART RATE: 63 BPM | TEMPERATURE: 98.4 F | HEIGHT: 63 IN
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
## 2019-01-24 LAB
ANION GAP SERPL CALCULATED.3IONS-SCNC: 6 MMOL/L (ref 4–13)
BASOPHILS # BLD AUTO: 0.05 THOUSANDS/ΜL (ref 0–0.1)
BASOPHILS NFR BLD AUTO: 1 % (ref 0–1)
BUN SERPL-MCNC: 17 MG/DL (ref 5–25)
CALCIUM SERPL-MCNC: 9.2 MG/DL (ref 8.3–10.1)
CHLORIDE SERPL-SCNC: 102 MMOL/L (ref 100–108)
CO2 SERPL-SCNC: 31 MMOL/L (ref 21–32)
CREAT SERPL-MCNC: 0.73 MG/DL (ref 0.6–1.3)
EOSINOPHIL # BLD AUTO: 0.14 THOUSAND/ΜL (ref 0–0.61)
EOSINOPHIL NFR BLD AUTO: 2 % (ref 0–6)
ERYTHROCYTE [DISTWIDTH] IN BLOOD BY AUTOMATED COUNT: 15.7 % (ref 11.6–15.1)
GFR SERPL CREATININE-BSD FRML MDRD: 69 ML/MIN/1.73SQ M
GLUCOSE P FAST SERPL-MCNC: 72 MG/DL (ref 65–99)
GLUCOSE SERPL-MCNC: 72 MG/DL (ref 65–140)
HCT VFR BLD AUTO: 42.2 % (ref 34.8–46.1)
HGB BLD-MCNC: 13.2 G/DL (ref 11.5–15.4)
IMM GRANULOCYTES # BLD AUTO: 0.02 THOUSAND/UL (ref 0–0.2)
IMM GRANULOCYTES NFR BLD AUTO: 0 % (ref 0–2)
LYMPHOCYTES # BLD AUTO: 1.46 THOUSANDS/ΜL (ref 0.6–4.47)
LYMPHOCYTES NFR BLD AUTO: 24 % (ref 14–44)
MCH RBC QN AUTO: 26.2 PG (ref 26.8–34.3)
MCHC RBC AUTO-ENTMCNC: 31.3 G/DL (ref 31.4–37.4)
MCV RBC AUTO: 84 FL (ref 82–98)
MONOCYTES # BLD AUTO: 0.99 THOUSAND/ΜL (ref 0.17–1.22)
MONOCYTES NFR BLD AUTO: 16 % (ref 4–12)
NEUTROPHILS # BLD AUTO: 3.45 THOUSANDS/ΜL (ref 1.85–7.62)
NEUTS SEG NFR BLD AUTO: 57 % (ref 43–75)
NRBC BLD AUTO-RTO: 0 /100 WBCS
PLATELET # BLD AUTO: 181 THOUSANDS/UL (ref 149–390)
PMV BLD AUTO: 10.1 FL (ref 8.9–12.7)
POTASSIUM SERPL-SCNC: 3.7 MMOL/L (ref 3.5–5.3)
RBC # BLD AUTO: 5.04 MILLION/UL (ref 3.81–5.12)
SODIUM SERPL-SCNC: 139 MMOL/L (ref 136–145)
WBC # BLD AUTO: 6.11 THOUSAND/UL (ref 4.31–10.16)

## 2019-01-24 PROCEDURE — 80048 BASIC METABOLIC PNL TOTAL CA: CPT | Performed by: PHYSICIAN ASSISTANT

## 2019-01-24 PROCEDURE — 99217 PR OBSERVATION CARE DISCHARGE MANAGEMENT: CPT | Performed by: PHYSICIAN ASSISTANT

## 2019-01-24 PROCEDURE — 97163 PT EVAL HIGH COMPLEX 45 MIN: CPT

## 2019-01-24 PROCEDURE — 99213 OFFICE O/P EST LOW 20 MIN: CPT | Performed by: INTERNAL MEDICINE

## 2019-01-24 PROCEDURE — 85025 COMPLETE CBC W/AUTO DIFF WBC: CPT | Performed by: PHYSICIAN ASSISTANT

## 2019-01-24 PROCEDURE — 99284 EMERGENCY DEPT VISIT MOD MDM: CPT

## 2019-01-24 PROCEDURE — G8978 MOBILITY CURRENT STATUS: HCPCS

## 2019-01-24 PROCEDURE — G8979 MOBILITY GOAL STATUS: HCPCS

## 2019-01-24 RX ADMIN — ASPIRIN 325 MG: 325 TABLET ORAL at 10:50

## 2019-01-24 RX ADMIN — VITAMIN D, TAB 1000IU (100/BT) 2000 UNITS: 25 TAB at 10:50

## 2019-01-24 RX ADMIN — METOPROLOL TARTRATE 12.5 MG: 25 TABLET ORAL at 10:51

## 2019-01-24 RX ADMIN — PANTOPRAZOLE SODIUM 40 MG: 40 TABLET, DELAYED RELEASE ORAL at 10:51

## 2019-01-24 RX ADMIN — ENOXAPARIN SODIUM 40 MG: 40 INJECTION SUBCUTANEOUS at 13:27

## 2019-01-24 RX ADMIN — POLYVINYL ALCOHOL 1 DROP: 14 SOLUTION/ DROPS OPHTHALMIC at 13:28

## 2019-01-24 RX ADMIN — LEVOTHYROXINE SODIUM 25 MCG: 25 TABLET ORAL at 05:56

## 2019-01-24 NOTE — PLAN OF CARE
Problem: Nutrition/Hydration-ADULT  Goal: Nutrient/Hydration intake appropriate for improving, restoring or maintaining nutritional needs  Monitor and assess patient's nutrition/hydration status for malnutrition (ex- brittle hair, bruises, dry skin, pale skin and conjunctiva, muscle wasting, smooth red tongue, and disorientation)  Collaborate with interdisciplinary team and initiate plan and interventions as ordered  Monitor patient's weight and dietary intake as ordered or per policy  Utilize nutrition screening tool and intervene per policy  Determine patient's food preferences and provide high-protein, high-caloric foods as appropriate  INTERVENTIONS:  - Monitor oral intake, urinary output, labs, and treatment plans  - Assess nutrition and hydration status and recommend course of action  - Evaluate amount of meals eaten  - Assist patient with eating if necessary   - Allow adequate time for meals  - Recommend/ encourage appropriate diets, oral nutritional supplements, and vitamin/mineral supplements  - Order, calculate, and assess calorie counts as needed  - Recommend, monitor, and adjust tube feedings and TPN/PPN based on assessed needs  - Assess need for intravenous fluids  - Provide specific nutrition/hydration education as appropriate  - Include patient/family/caregiver in decisions related to nutrition  Outcome: Progressing  NPO appropriate at this time  Will await goals of care from pt's daughter

## 2019-01-24 NOTE — ASSESSMENT & PLAN NOTE
· Patient noted to be malnourished, cachectic with BMI of 16 47   · Patient with long term dysphagia per daughter and is on pureed diet with ensure supplementation recommended  · Daughter wishes for conservative measures - nutritional drinks and no further investigation of her poor oral intake/dysphagia

## 2019-01-24 NOTE — PROGRESS NOTES
Cardiology Progress Note    Assessment/Plan   Elevated troponin of undetermined significance  CAD, remote PCI  CVA  Dementia  Aphasia    Continue conservative management  No role for imaging or invasive treatment  Decrease aspirin to 81 mg daily  Thank you for this interesting consult  Signing off  Please call with questions  LOS: 0 days     Subjective   Troponin peaked at 0 22, now 0 10  Objective     Vital signs in last 24 hours:  Temp:  [97 4 °F (36 3 °C)-98 3 °F (36 8 °C)] 97 4 °F (36 3 °C)  HR:  [60-66] 64  Resp:  [12-18] 18  BP: (132-162)/(62-81) 148/66    Physical Exam:   General: Sleeping peacefully  HEENT: Normocephalic/Atraumatic, No thyromegaly, lymphadenopathy, JVD or carotid bruits  Cardiovasc: Regular rhythm with a normal rate  No murmurs, rubs or gallops  Radial pulses are 2+/4  Chest/Pulm: CTAB, no wheezes, rales or rhonchi  Abdomen: +BSx4, Soft, non-tender  No , organomegaly, rebound, rigidity or guarding  Extremities: No edema        Scheduled Meds:  Current Facility-Administered Medications:  acetaminophen 650 mg Oral Q6H PRN Erika Turner MD    aspirin 325 mg Oral Daily Erika Turner MD    cholecalciferol 2,000 Units Oral Daily Erika Turner MD    enoxaparin 40 mg Subcutaneous Daily Erika Turner MD    iohexol 100 mL Intravenous Once in imaging Karthik Morton MD    levothyroxine 25 mcg Oral Early Morning Erika Turner MD    metoprolol tartrate 12 5 mg Oral Q12H [address] Erika Turner MD    pantoprazole 40 mg Oral Daily Erika Turner MD    polyvinyl alcohol 1 drop Both Eyes Q12H [address] Erika Turner MD    sodium chloride 75 mL/hr Intravenous Continuous Landon Jamison PA-C Last Rate: 75 mL/hr (01/23/19 1847)     Continuous Infusions:  sodium chloride 75 mL/hr Last Rate: 75 mL/hr (01/23/19 1847)     PRN Meds:   acetaminophen    iohexol      Lab Review   Results for Jose Manuel Flower (MRN [id number]) as of 1/24/2019 10:09   1/24/2019 04:57   Sodium 139   Potassium 3 7 Chloride 102   CO2 31   Anion Gap 6   BUN 17   Creatinine 0 73   Glucose, Random 72   GLUCOSE FASTING 72   Calcium 9 2       Results for MEMESOLPACHECO (MRN [id number]) as of 1/24/2019 10:09   1/24/2019 04:57   WBC 6 11   Red Blood Cell Count 5 04   Hemoglobin 13 2   HCT 42 2   MCV 84   MCH 26 2 (L)   MCHC 31 3 (L)   RDW 15 7 (H)   Platelet Count 968       Results for Alma Narendra A (MRN [id number]) as of 1/24/2019 10:09   1/22/2019 22:58 1/23/2019 02:13 1/23/2019 05:40 1/23/2019 08:43 1/23/2019 11:47   Troponin I 0 22 (H) 0 17 (H) 0 16 (H) 0 14 (H) 0 10 (H)

## 2019-01-24 NOTE — MALNUTRITION/BMI
This medical record reflects one or more clinical indicators suggestive of malnutrition and/or morbid obesity  Malnutrition Findings:   Malnutrition type: Chronic illness  Degree of Malnutrition: Other severe protein calorie malnutrition  Malnutrition Characteristics: Fat loss, Muscle loss, Inadequate energy, Weight loss    Malnutrition related to inadequate energy intake & dysphagia as evidenced by clavicle protruding, moderate temporal wasting, moderate subcutaneous fat loss orbital/cheek region, severe subcutaneous fat loss b/l upper extremities, moderate wasting interosseous muscle , 7#/7% wt loss since 10/23/18 (3 months), <75% intake compared to estimated needs for >1 month  BMI Findings:  BMI Classifications: Underweight < 18 5     Body mass index is 16 47 kg/m²  See Nutrition note dated 01/24/19 for additional details  Completed nutrition assessment is viewable in the nutrition documentation

## 2019-01-24 NOTE — PLAN OF CARE
Problem: DISCHARGE PLANNING - CARE MANAGEMENT  Goal: Discharge to post-acute care or home with appropriate resources  INTERVENTIONS:  - Conduct assessment to determine patient/family and health care team treatment goals, and need for post-acute services based on payer coverage, community resources, and patient preferences, and barriers to discharge  - Address psychosocial, clinical, and financial barriers to discharge as identified in assessment in conjunction with the patient/family and health care team  - Arrange appropriate level of post-acute services according to patients   needs and preference and payer coverage in collaboration with the physician and health care team  - Communicate with and update the patient/family, physician, and health care team regarding progress on the discharge plan  - Arrange appropriate transportation to post-acute venues  Outcome: Adequate for Discharge  obs letter reviewed,  Patient is from [facility] and returning today  Treatment team aware

## 2019-01-24 NOTE — PLAN OF CARE
Problem: PHYSICAL THERAPY ADULT  Goal: Performs mobility at highest level of function for planned discharge setting  See evaluation for individualized goals  Treatment/Interventions: Functional transfer training, LE strengthening/ROM, Therapeutic exercise, Endurance training, Patient/family training, Bed mobility, Gait training, Spoke to nursing, Spoke to case management          See flowsheet documentation for full assessment, interventions and recommendations  Prognosis: Fair  Problem List: Decreased strength, Decreased endurance, Impaired balance, Decreased mobility, Decreased cognition, Decreased safety awareness  Assessment: pt is a 89y/o f who presents to [facility] s/p NSTEMI  PMH significant for CVA c residual aphasia, hypothyroidism, dementia, falls, multiple rib fxs + FTT  pt extremely poor historian  resides at [address] c RW  currently requires min-mod (A)x1 to complete mobility tasks 2* deficits in strength, balance, gait quality, cognition, + activity tolerance noted in PT exam above  Barthel Index 30/100  mod verbal cues required for safety c transitions + mobility tasks  ambulated 3' laterally at EOB c RW c verbal/tactile cues for sequencing + RW management  would benefit from skilled PT to maximize functional mobility + improve quality of life  returned to supine at end of session c bed alarm activated  upon d/c, recommend STR  PT eval of high complexity 2* unstable med status c pt requiring ongoing medical management 2* NSTEMI  pt c multiple co-morbidities including rib fxs, h/o falls, + CVA c residual aphasia impacting PT interventions  requires min-mod (A)x1 to complete mobility tasks 2* deficits above  Barriers to Discharge: None     Recommendation: Short-term skilled PT     PT - OK to Discharge: Yes (to STR )    See flowsheet documentation for full assessment

## 2019-01-24 NOTE — DISCHARGE SUMMARY
Discharge- Crys Payan 5/2/1921, 80 y o  female MRN: [id number]    Unit/Bed#: -01 Encounter: [id number]    Primary Care Provider: Robyn Spring MD   Date and time admitted to hospital: 1/22/2019 10:31 PM        Dysphagia   Assessment & Plan    · CTA notes debris in the esophagus concerning for aspiration risk and esophageal dysmotility  Prior CT Scan also showed a dilated, fluid filled esophagus - I am concerned about the possible of achalasia in this patient  · I discussed with patient's daughter Cuba Child the goals of care with this patient: she reports that she does not want her mother to go through any further testing  She does not want an EGD or potential treatment for a motility disorder  She reports a chronic history of ongoing progressive dysphagia with the patient and due to her advanced age, she simply wishes for her to return to her pureed diet and return to [facility] (she is 80 with dementia, CVA with aphasia, appears cachetic/underweight, etc )  She was made aware of the risk of aspiration, etc      * NSTEMI (non-ST elevated myocardial infarction) ([facility] [address] )   Assessment & Plan    · She has mildly elevated Troponins of undetermined significance  · She has a history of pacemaker placement and prior PCI for CAD remotely  · Appreciate cardiology input  Given advanced age and dementia, conservative management recommended  No plan for further work up  · I also discussed with the patient's daughter, and she does not want any further testing  · On Asa and Metoprolol  Multiple rib fractures   Assessment & Plan    · CTA notes subacute to chronic fractures seen at the right 4th, 5th and 6th ribs  · Fall precautions  Failure to thrive in adult   Assessment & Plan    · Patient noted to be malnourished, cachectic with BMI of 16 47   · Patient with long term dysphagia per daughter and is on pureed diet with ensure supplementation recommended    · Daughter wishes for conservative measures - nutritional drinks and no further investigation of her poor oral intake/dysphagia  Dementia   Assessment & Plan    · She has dementia and history of CVA with aphasia  Hypothyroidism   Assessment & Plan    On Synthroid 25 mcg daily  Discharging Physician / Practitioner: Danni Rome PA-C  PCP: Radha Gardner MD  Admission Date:   Admission Orders     Ordered        01/23/19 0058  Place in Observation (expected length of stay for this patient is less than two midnights)  Once             Discharge Date: 01/24/19    Resolved Problems  Date Reviewed: 1/24/2019    None          Consultations During Hospital Stay:  · Cardiology    Procedures Performed:   · None    Significant Findings / Test Results:   · Troponins 0 17, 0 16, 0 14, 0 1  · CTA Dissection Chest: 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 abdominal aortic aneurysms measuring 4 9 and 4 1 cm respectively  Extensive debris within the esophagus, which places the patient at risk for aspiration  Chronic fractures seen at the right 4th, 5th and 6th ribs  No evidence of pancreatitis  Test Results Pending at Discharge (will require follow up): · None       Complications:  None    Reason for Admission: Abdominal pain    Hospital Course: Марина Kendrick is a 80 y o  female patient who originally presented to the hospital on 1/22/2019 due to abdominal pain  Patient has a history advanced dementia and CVA with aphasia  Patient presented from get personal home due to complaints of abdominal pain  CTA for dissection of the chest was performed which showed an abdominal aortic aneurysm, extensive debris in the esophagus which places the patient at risk for aspiration and chronic fractures of the right 4th 5th and 6th ribs  Troponins were also noted to be mildly elevated  History is minimal to obtain from patient due to her her dementia and aphasia    Cardiology evaluated the patient with recommendation for no further invasive testing due to her advanced age and after discussion with patient's family  A discussion was had with patient's daughter Paul Wang during her hospitalization regarding the CT findings of multiple debris in the esophagus concerning for aspiration risk and prior CT scan also showing a dilated esophagus  Patient patient may have a esophageal motility disorder such as achalasia causing her to have dysphasia and incomplete emptying of her esophagus  Discussion for potential of further evaluation with potential EGD and potential treatment achalasia to include Botox injection was discussed as well as the risks  Patient's daughter noted that patient has had chronic issues with dysphagia which has progressed on and remains on a pureed diet and her over and overall intake is poor  Due to her advanced age and status, patient's daughter wishes for conservative approach and does not want any further testing during this admission  She wishes for her mother to return to get personal home as soon as possible on a pureed diet  She does understand the risk of aspiration  Patient remained stable during her admission without any acute events  Please see above list of diagnoses and related plan for additional information  Condition at Discharge: stable     Discharge Day Visit / Exam:     Subjective:  Patient has dementia and aphasia but reported "no" to any pain or SOB  Vitals: Blood Pressure: 141/79 (01/24/19 1100)  Pulse: 63 (01/24/19 1100)  Temperature: 98 4 °F (36 9 °C) (01/24/19 1100)  Temp Source: Oral (01/24/19 1100)  Respirations: 18 (01/24/19 1100)  Height: 5' 3" (160 cm) (01/24/19 1023)  Weight - Scale: 42 2 kg (93 lb) (01/24/19 1023)  SpO2: 92 % (01/24/19 1100)  Exam:   Physical Exam   Constitutional:   Cachetic  HENT:   Head: Normocephalic and atraumatic  Eyes: No scleral icterus  Cardiovascular: Normal rate, regular rhythm and normal heart sounds  Pulmonary/Chest: No respiratory distress  She has no wheezes  She has no rales  Abdominal: Soft  Bowel sounds are normal  She exhibits no distension  There is no tenderness  Musculoskeletal: She exhibits no edema  Neurological: She is alert  Demented and aphasia  Discussion with Family: Discussed with patient's daughter Janett Millan    Discharge instructions/Information to patient and family:   See after visit summary for information provided to patient and family  Provisions for Follow-Up Care:  See after visit summary for information related to follow-up care and any pertinent home health orders  Disposition:     [facility]  For Discharges to [facility] SNF:   · Not Applicable to this Patient - Not Applicable to this Patient    Planned Readmission: None     Discharge Statement:  I spent 40 minutes discharging the patient  This time was spent on the day of discharge  I had direct contact with the patient on the day of discharge  Greater than 50% of the total time was spent examining patient, answering all patient questions, arranging and discussing plan of care with patient as well as directly providing post-discharge instructions  Additional time then spent on discharge activities  Discharge Medications:  See after visit summary for reconciled discharge medications provided to patient and family        ** Please Note: This note has been constructed using a voice recognition system **

## 2019-01-24 NOTE — PHYSICAL THERAPY NOTE
PT Evaluation (20min)  (10:30-10:50)    Past Medical History:   Diagnosis Date    Aphasia as late effect of cerebrovascular accident     Arthritis     [facility]rioventricular block, complete ([facility] [address] )     CAD (coronary artery disease)     remote stenting    Cardiac pacemaker in situ 12/09/2009    Cerebral infarct ([facility] [address] )     due to unspecified occlusion and stenosis of unspecified cerebral artery    Diabetes ([facility] [address] ) 10/28/2018    Disease of thyroid gland     Dysphagia     Essential (primary) hypertension     GERD (gastroesophageal reflux disease)     PUD    Gout     History of echocardiogram 04/27/2015    EF 55%, mild MR, mild TR   Hypothyroidism     Mixed hyperlipidemia     Seizures (HCC)     Shoulder fracture, left     Stroke (HCC)     dysphagia, aphasia    Subarachnoid hemorrhage (HCC)     Wrist fracture, closed, left, with routine healing, subsequent encounter 2003 01/24/19 1050   Note Type   Note type Eval only   Pain Assessment   Pain Assessment No/denies pain   Home Living   Type of Home Assisted living  ([address]   Home Equipment Walker   Additional Comments pt poor historian + c h/o aphasia s/p CVA  hx obtained from pt's chart + previous admissions  Prior Function   Level of [place] Needs assistance with ADLs and functional mobility  (ambulates c RW)   Receives Help From Other (Comment)  ([facility] staff prn)   ADL Assistance Needs assistance   IADLs Needs assistance   Falls in the last 6 months 1 to 4   Vocational Retired   Restrictions/Precautions   Other Precautions Cognitive; Chair Alarm; Bed Alarm;Multiple lines;Telemetry; Fall Risk  (aphasia s/p chronic CVA)   General   Additional Pertinent History pt presents to [facility] c L UQ pan + substernal chest pain  dx: NSTEMI  PT consulted for mobility + d/c planning  cleared for mobility by ns staff      Family/Caregiver Present No   Cognition   Orientation Level Oriented to person   RUE Assessment   RUE Assessment WFL   LUE Assessment   LUE Assessment WFL   RLE Assessment   RLE Assessment WFL  (4-/5)   LLE Assessment   LLE Assessment WFL  (4-/5)   Coordination   Sensation WFL   Bed Mobility   Supine to Sit 3  Moderate assistance   Additional items Assist x 1;HOB elevated; Increased time required;Verbal cues;LE management   Sit to Supine 3  Moderate assistance   Additional items Assist x 1;Verbal cues;LE management; Increased time required   Transfers   Sit to Stand 4  Minimal assistance   Additional items Assist x 1;Verbal cues; Increased time required   Stand to Sit 4  Minimal assistance   Additional items Assist x 1;Verbal cues; Increased time required   Ambulation/Elevation   Gait pattern Narrow ZEN; Forward Flexion; Improper Weight shift;Decreased foot clearance; Excessively slow; Step to   Gait Assistance 4  Minimal assist   Additional items Assist x 1;Verbal cues; Tactile cues   Assistive Device Rolling walker   Distance 3' laterally at EOB; limited by fatigue/weakness   Balance   Static Sitting Fair   Dynamic Sitting Fair   Static Standing Poor +   Dynamic Standing Poor   Ambulatory Poor   Activity Tolerance   Activity Tolerance Patient limited by fatigue   Nurse Made Aware Kathleen   Assessment   Prognosis Fair   Problem List Decreased strength;Decreased endurance; Impaired balance;Decreased mobility; Decreased cognition;Decreased safety awareness   Assessment pt is a 89y/o f who [facility] s/p NSTEMI  PMH significant for CVA c residual aphasia, hypothyroidism, dementia, falls, multiple rib fxs + FTT  pt extremely poor historian  resides at [address] c RW  currently requires min-mod (A)x1 to complete mobility tasks 2* deficits in strength, balance, gait quality, cognition, + activity tolerance noted in PT exam above  Barthel Index 30/100  mod verbal cues required for safety c transitions + mobility tasks  ambulated 3' laterally at EOB c RW c verbal/tactile cues for sequencing + RW management   would benefit from skilled PT to maximize functional mobility + improve quality of life  returned to supine at end of session c bed alarm activated  upon d/c, recommend STR  PT eval of high complexity 2* unstable med status c pt requiring ongoing medical management 2* NSTEMI  pt c multiple co-morbidities including rib fxs, h/o falls, + CVA c residual aphasia impacting PT interventions  requires min-mod (A)x1 to complete mobility tasks 2* deficits above  Barriers to Discharge None   Goals   Patient Goals none expressed 2* aphasia   STG Expiration Date 02/03/19   Short Term Goal #1 1  increase strength 1/2 grade to improve overall functional mobility, 2  perform bed mobility c (S) to decrease caregiver burden, 3  perform transfers c (S) to safely perform ADLs, 4  ambulate 150' c (S) + RW to safely navigate [place]   Plan   Treatment/Interventions Functional transfer training;LE strengthening/ROM; Therapeutic exercise; Endurance training;Patient/family training;Bed mobility;Gait training;Spoke to nursing;Spoke to case management   PT Frequency Other (Comment)  (3-5x/wk)   Recommendation   Recommendation Short-term skilled PT   PT - OK to Discharge Yes  (to STR )   Barthel Index   Feeding 5   Bathing 0   Grooming Score 0   Dressing Score 5   Bladder Score 0   Bowels Score 10   Toilet Use Score 5   Transfers (Bed/Chair) Score 5   Mobility (Level Surface) Score 0   Stairs Score 0   Barthel Index Score 30     Collin Mings, PT, DPT

## 2019-01-24 NOTE — ASSESSMENT & PLAN NOTE
· CTA notes debris in the esophagus concerning for aspiration risk and esophageal dysmotility  Prior CT Scan also showed a dilated, fluid filled esophagus - I am concerned about the possible of achalasia in this patient  · I discussed with patient's daughter Paul Wang the goals of care with this patient: she reports that she does not want her mother to go through any further testing  She does not want an EGD or potential treatment for a motility disorder  She reports a chronic history of ongoing progressive dysphagia with the patient and due to her advanced age, she simply wishes for her to return to her pureed diet and return to [facility] (she is 80 with dementia, CVA with aphasia, appears cachetic/underweight, etc )    She was made aware of the risk of aspiration, etc

## 2019-01-24 NOTE — ASSESSMENT & PLAN NOTE
· She has mildly elevated Troponins of undetermined significance  · She has a history of pacemaker placement and prior PCI for CAD remotely  · Appreciate cardiology input  Given advanced age and dementia, conservative management recommended  No plan for further work up  · I also discussed with the patient's daughter, and she does not want any further testing  · On Asa and Metoprolol

## 2019-01-24 NOTE — UTILIZATION REVIEW
Continued Stay Review    Date: 1/24  Vital Signs: /79 (BP Location: Left arm)   Pulse 63   Temp 98 4 °F (36 9 °C) (Oral)   Resp 18   Ht 5' 3" (1 6 m)   Wt 42 2 kg (93 lb)   SpO2 92%   BMI 16 47 kg/m²   Assessment/Plan: Elevated troponin of undetermined significance  CAD, remote PCI  CVA  Dementia  Aphasia   Continue conservative management  No role for imaging or invasive treatment  Decrease aspirin to 81 mg daily      Medications:   Scheduled Meds:   Current Facility-Administered Medications:  acetaminophen 650 mg Oral Q6H PRN     aspirin 325 mg Oral Daily     cholecalciferol 2,000 Units Oral Daily     enoxaparin 40 mg Subcutaneous Daily     iohexol 100 mL Intravenous Once in imaging     levothyroxine 25 mcg Oral Early Morning     metoprolol tartrate 12 5 mg Oral Q12H WANG     pantoprazole 40 mg Oral Daily     polyvinyl alcohol 1 drop Both Eyes Q12H [address]     sodium chloride 75 mL/hr Intravenous Continuous  Last Rate: 75 mL/hr (01/23/19 1847)     Pertinent Labs/Diagnostic Results: none   Age/Sex: 80 y o  female   Discharge Plan: TBD

## 2019-01-24 NOTE — SOCIAL WORK
Cm spoke with Linda Davalos from [facility] regarding patient bedside assessment  Cm informed patient could return to the facility when medically stable  Cm informed Linda Davalos patient would return this day as cm informed patient was medically stable  Cm informed nurse could call report at  and fax dci at   Cm informed nurse  Cm contacted patient daughter Norberto Dawn who was also informed patient was under OBS status, daughter provided cm with a physical address to mail the OBS notice ([address], [address])    Patient will return to [address] [place] [address] this pm, patient daughter and treatment team aware  OBS letter mailed to [facility]

## 2019-01-24 NOTE — SOCIAL WORK
Power contacted [facility] and spoke with  Joe Miller Cm informed patient has an assist with a rolling walker and receiving assistance with ADL's at the facility  Medications administered at the facility, patient daughter Rajwinder Lopez is her POA   Power informed a bedside assessment would need to be completed and the physical therapist Anisha would be at the bedside at 1pm

## 2019-01-25 VITALS
HEART RATE: 70 BPM | WEIGHT: 94.8 LBS | RESPIRATION RATE: 16 BRPM | OXYGEN SATURATION: 91 % | DIASTOLIC BLOOD PRESSURE: 79 MMHG | TEMPERATURE: 98.3 F | BODY MASS INDEX: 16.79 KG/M2 | SYSTOLIC BLOOD PRESSURE: 176 MMHG

## 2019-01-25 NOTE — ED NOTES
Patient crawled out of bed and standing in doorway  Patient ambulated to restroom  Patient placed in hoffman bed in front of nursing desk for safety  Patient made comfortable in litter    Patient transport time [address]  [facility], [address] [place] [address]  01/25/19 9881

## 2019-01-25 NOTE — ED NOTES
Phone call to [employer] dispatch for return transport       Jermaine Peterson RN  01/24/19 9061

## 2019-01-25 NOTE — ED PROVIDER NOTES
History  Chief Complaint   Patient presents with    Fall     Patient sent from [facility] status post fall from standing  Patient was discharged from [address] [place] [address] today  Patient is a 51-year-old female presents emergency department after suffering a fall at [facility] today  Patient was an inpatient at [address] [place] [address] and was discharged today  Shortly upon arrival to [facility], she became unsteady and fell on her back  Patient denies head injury  Patient denies any pain at all  Patient states that she wants to leave and has no complaints  Prior to Admission Medications   Prescriptions Last Dose Informant Patient Reported? Taking?    Acetaminophen 325 MG CAPS   Yes No   Sig: Take 650 mg by mouth every 6 (six) hours as needed   Cholecalciferol 2000 units TABS   Yes No   Sig: Take 1 tablet by mouth daily   Multiple Vitamin (DAILY-JOSE LUIS PO)   Yes No   Sig: Take 1 tablet by mouth   Nutritional Supplements (ENSURE ACTIVE PO)   Yes No   Sig: Take by mouth daily   aspirin 325 mg tablet   Yes No   Sig: Take 325 mg by mouth daily   cycloSPORINE (RESTASIS) 0 05 % ophthalmic emulsion   Yes No   Sig: Administer 1 drop to both eyes 2 (two) times a day   levothyroxine 25 mcg tablet   Yes No   Sig: Take 25 mcg by mouth daily   metoprolol tartrate (LOPRESSOR) 25 mg tablet   No No   Sig: Take 0 5 tablets (12 5 mg total) by mouth every 12 (twelve) hours   pantoprazole (PROTONIX) 40 mg tablet   Yes No   Sig: Take 40 mg by mouth daily   polyvinyl alcohol (LIQUIFILM TEARS) 1 4 % ophthalmic solution   Yes No   Sig: Administer 2 drops to both eyes daily      Facility-Administered Medications: None       Past Medical History:   Diagnosis Date    Aphasia as late effect of cerebrovascular accident     Arthritis     Atrioventricular block, complete ([address] )     CAD (coronary artery disease)     remote stenting    Cardiac pacemaker in situ 12/09/2009    Cerebral infarct ([facility] )     due to unspecified occlusion and stenosis of unspecified cerebral artery    Diabetes ([facility] ) 10/28/2018    Disease of thyroid gland     Dysphagia     Essential (primary) hypertension     GERD (gastroesophageal reflux disease)     PUD    Gout     History of echocardiogram 04/27/2015    EF 55%, mild MR, mild TR   Hypothyroidism     Mixed hyperlipidemia     Seizures (HCC)     Shoulder fracture, left     Stroke (HCC)     dysphagia, aphasia    Subarachnoid hemorrhage (HCC)     Wrist fracture, closed, left, with routine healing, subsequent encounter 2003       Past Surgical History:   Procedure Laterality Date   [place] CARDIAC PACEMAKER PLACEMENT  12/09/2009    [employer] [employer] [facility] Model [id number] Serial # [id number]    CARDIAC PACEMAKER PLACEMENT Left 10/23/2018    Procedure: REMOVE AND REPLACE DUAL LEAD PACEMAKER;  Surgeon: Oanh Khoury MD;  Location: [address] [place] [address] MAIN OR;  Service: Cardiology    CORONARY ANGIOPLASTY WITH STENT PLACEMENT      unsure of date    EYE SURGERY      FRACTURE SURGERY      SKIN BIOPSY         Family History   Problem Relation Age of Onset    Arthritis Mother     Hearing loss Mother     Diabetes Father     Early death Father     Hypertension Father     Stroke Father     Vision loss Father     Diabetes Sister     Hearing loss Sister     Heart disease Sister     Hypertension Sister    Fleurette Sweta / [place] Sister     Stroke Sister     Diabetes Brother     Early death Brother     Hearing loss Brother     Learning disabilities Brother     Vision loss Brother      I have reviewed and agree with the history as documented  Social History   Substance Use Topics    Smoking status: Former Smoker     Types: Cigarettes    Smokeless tobacco: Never Used    Alcohol use No        Review of Systems   Constitutional: Negative for fever  Respiratory: Negative for shortness of breath  Cardiovascular: Negative for chest pain  All other systems reviewed and are negative        Physical Exam  Physical Exam   Constitutional: She appears well-developed and well-nourished  HENT:   Head: Normocephalic and atraumatic  Right Ear: External ear normal    Left Ear: External ear normal    Nose: Nose normal    Mouth/Throat: Oropharynx is clear and moist    Eyes: Pupils are equal, round, and reactive to light  Conjunctivae and EOM are normal    Neck: Normal range of motion  Cardiovascular: Normal rate, regular rhythm and normal heart sounds  Pulmonary/Chest: Effort normal and breath sounds normal    Abdominal: Soft  Bowel sounds are normal    Musculoskeletal: Normal range of motion  Neurological: She is alert  No cranial nerve deficit  There are no focal neuro deficits  Patient is chronically confused, appears to be at baseline  Skin: Skin is warm  Psychiatric: She has a normal mood and affect  Her behavior is normal  Judgment and thought content normal    Vitals reviewed  Vital Signs  ED Triage Vitals [01/24/19 1947]   Temperature Pulse Respirations Blood Pressure SpO2   98 3 °F (36 8 °C) 60 18 131/63 97 %      Temp Source Heart Rate Source Patient Position - Orthostatic VS BP Location FiO2 (%)   Oral Monitor Lying Right arm --      Pain Score       No Pain           Vitals:    01/24/19 1947 01/24/19 2050 01/24/19 2241 01/25/19 0000   BP: 131/63 132/60 133/67 130/62   Pulse: 60 68 64 70   Patient Position - Orthostatic VS: Lying   Sitting       Visual Acuity  Visual Acuity      Most Recent Value   L Pupil Size (mm)  3   R Pupil Size (mm)  3          ED Medications  Medications - No data to display    Diagnostic Studies  Results Reviewed     None                 No orders to display              Procedures  Procedures       Phone Contacts  ED Phone Contact    ED Course                               MDM  Number of Diagnoses or Management Options  Fall:   Diagnosis management comments: Patient is a 80-year-old female presents to the emergency department after a fall at her personal care home    Patient has no specific complaints  Patient's exam is unremarkable  I contacted the patient's daughter, Aly tovar communicated with the other sister Sheela Hamper  They do not wish to have CT scan or any other further testing on the mother  They state that if patient is able to walk around without difficulties a wish for her to be transferred back to [facility]  Patient ambulated multiple times throughout the emergency department without difficulty, patient's lab work is unremarkable  Patient stable for discharge  Amount and/or Complexity of Data Reviewed  Clinical lab tests: ordered  Tests in the radiology section of CPT®: ordered and reviewed  Tests in the medicine section of CPT®: ordered and reviewed    Patient Progress  Patient progress: stable    CritCare Time    Disposition  Final diagnoses:   Fall     Time reflects when diagnosis was documented in both MDM as applicable and the Disposition within this note     Time User Action Codes Description Comment    1/24/2019 10:13 PM Jarvis Sumner Add [A90  XXXA] Fall       ED Disposition     ED Disposition Condition Comment    Discharge  [address] discharge to home/self care  Condition at discharge: Good        Follow-up Information     Follow up With Specialties Details Why Contact Info    Robyn Spring MD Internal Medicine   [address] [place] [address]  [phone]            Patient's Medications   Discharge Prescriptions    No medications on file     No discharge procedures on file      ED Provider  Electronically Signed by           Gabby Taylor PA-C  01/25/19 0025

## 2019-03-21 ENCOUNTER — CLINICAL SUPPORT (OUTPATIENT)
Dept: CARDIOLOGY CLINIC | Facility: CLINIC | Age: 84
End: 2019-03-21
Payer: MEDICARE

## 2019-03-21 DIAGNOSIS — I49.5 SICK SINUS SYNDROME (HCC): Primary | ICD-10-CM

## 2019-03-21 DIAGNOSIS — Z45.010 ENCOUNTER FOR CHECKING AND TESTING OF CARDIAC PACEMAKER PULSE GENERATOR (BATTERY): ICD-10-CM

## 2019-03-21 PROCEDURE — 93293 PM PHONE R-STRIP DEVICE EVAL: CPT | Performed by: INTERNAL MEDICINE

## 2019-03-21 NOTE — PROGRESS NOTES
TTM pacer battery close to [facility]  Checking monthly         Current Outpatient Medications:     Acetaminophen 325 MG CAPS, Take 650 mg by mouth every 6 (six) hours as needed, Disp: , Rfl:     aspirin 325 mg tablet, Take 325 mg by mouth daily, Disp: , Rfl:     Cholecalciferol 2000 units TABS, Take 1 tablet by mouth daily, Disp: , Rfl:     cycloSPORINE (RESTASIS) 0 05 % ophthalmic emulsion, Administer 1 drop to both eyes 2 (two) times a day, Disp: , Rfl:     levothyroxine 25 mcg tablet, Take 25 mcg by mouth daily, Disp: , Rfl:     metoprolol tartrate (LOPRESSOR) 25 mg tablet, Take 0 5 tablets (12 5 mg total) by mouth every 12 (twelve) hours, Disp: 60 tablet, Rfl: 0    Multiple Vitamin (DAILY-JOSE LUIS PO), Take 1 tablet by mouth, Disp: , Rfl:     Nutritional Supplements (ENSURE ACTIVE PO), Take by mouth daily, Disp: , Rfl:     pantoprazole (PROTONIX) 40 mg tablet, Take 40 mg by mouth daily, Disp: , Rfl:     polyvinyl alcohol (LIQUIFILM TEARS) 1 4 % ophthalmic solution, Administer 2 drops to both eyes daily, Disp: , Rfl:

## 2019-04-23 ENCOUNTER — CLINICAL SUPPORT (OUTPATIENT)
Dept: CARDIOLOGY CLINIC | Facility: CLINIC | Age: 84
End: 2019-04-23
Payer: MEDICARE

## 2019-04-23 DIAGNOSIS — I49.5 SICK SINUS SYNDROME (HCC): Primary | ICD-10-CM

## 2019-04-23 DIAGNOSIS — Z45.010 ENCOUNTER FOR CHECKING AND TESTING OF CARDIAC PACEMAKER PULSE GENERATOR (BATTERY): ICD-10-CM

## 2019-04-23 PROCEDURE — 93293 PM PHONE R-STRIP DEVICE EVAL: CPT | Performed by: INTERNAL MEDICINE

## 2019-08-16 ENCOUNTER — CLINICAL SUPPORT (OUTPATIENT)
Dept: CARDIOLOGY CLINIC | Facility: CLINIC | Age: 84
End: 2019-08-16

## 2019-08-16 DIAGNOSIS — I49.5 SICK SINUS SYNDROME (HCC): Primary | ICD-10-CM

## 2019-08-16 DIAGNOSIS — Z45.010 ENCOUNTER FOR CHECKING AND TESTING OF CARDIAC PACEMAKER PULSE GENERATOR (BATTERY): ICD-10-CM

## 2019-08-16 PROCEDURE — 93293 PM PHONE R-STRIP DEVICE EVAL: CPT | Performed by: INTERNAL MEDICINE

## 2019-08-16 NOTE — PROGRESS NOTES
TTM pacer check   Normal battery function    Current Outpatient Medications:     Acetaminophen 325 MG CAPS, Take 650 mg by mouth every 6 (six) hours as needed, Disp: , Rfl:     aspirin 325 mg tablet, Take 325 mg by mouth daily, Disp: , Rfl:     Cholecalciferol 2000 units TABS, Take 1 tablet by mouth daily, Disp: , Rfl:     cycloSPORINE (RESTASIS) 0 05 % ophthalmic emulsion, Administer 1 drop to both eyes 2 (two) times a day, Disp: , Rfl:     levothyroxine 25 mcg tablet, Take 25 mcg by mouth daily, Disp: , Rfl:     metoprolol tartrate (LOPRESSOR) 25 mg tablet, Take 0 5 tablets (12 5 mg total) by mouth every 12 (twelve) hours, Disp: 60 tablet, Rfl: 0    Multiple Vitamin (DAILY-JOSE LUIS PO), Take 1 tablet by mouth, Disp: , Rfl:     Nutritional Supplements (ENSURE ACTIVE PO), Take by mouth daily, Disp: , Rfl:     pantoprazole (PROTONIX) 40 mg tablet, Take 40 mg by mouth daily, Disp: , Rfl:     polyvinyl alcohol (LIQUIFILM TEARS) 1 4 % ophthalmic solution, Administer 2 drops to both eyes daily, Disp: , Rfl:

## 2022-09-26 NOTE — PLAN OF CARE
CHIEF COMPLAINT  Eye Exam and Office Visit (//)    Paola Lucas is a 60 year old female who presents for comprehensive eye exam. Patient was seen at our office last year for uveitis and chalazion. Both resolved well with drops and ointment, respectively. Here to update glasses. Current pairs are working well overall, has separate single vision pairs for distance and reading  Negative burning, itching, redness, tearing  Negative flashes, floaters, double vision, loss of vision    HISTORY OF PRESENT ILLNESS  I have reviewed and appreciated the technician's history and test results as outlined above.    VISION  Visual Acuity (Snellen - Linear)       Right Left    Dist sc 20/25 -3 20/30 -2    Near cc Sean 1 Sean 1           REFRACTION  Final Rx       Sphere Cylinder Axis    Right -1.00 +1.25 005    Left -1.00 +1.25 165    Type: Single Vision    Expiration Date: 9/26/2024      Final Rx #2       Sphere Cylinder Axis    Right +1.00 +1.25 005    Left +1.00 +1.25 165    Type: Near Single vision     Expiration Date: 9/26/2024          PHYSICAL EXAM  Main Ophthalmology Exam     External Exam       Right Left    External Normal Normal          Slit Lamp Exam       Right Left    Lids/Lashes Normal Normal    Conjunctiva/Sclera Clear Clear    Cornea Clear Clear    Anterior Chamber Deep and quiet Deep and quiet    Iris Round and regular Round and regular    Lens Clear Clear    Vitreous Clear Clear          Fundus Exam       Right Left    Disc Flat, pink with a healthy rim Flat, pink with a healthy rim    C/D Ratio 0.3 0.3    Macula Healthy with sharp foveal reflex Healthy with sharp foveal reflex    Vessels Healthy with normal Artery-Vein ratio Healthy with normal Artery-Vein ratio    Periphery Flat, healthy, without tears or detachments Flat, healthy, without tears or detachments                 ASSESSMENT   1. Myopia of both eyes with astigmatism and presbyopia        PLAN   1. Dispensed updated glasses Rx. Explained  Problem: Nutrition/Hydration-ADULT  Goal: Nutrient/Hydration intake appropriate for improving, restoring or maintaining nutritional needs  Monitor and assess patient's nutrition/hydration status for malnutrition (ex- brittle hair, bruises, dry skin, pale skin and conjunctiva, muscle wasting, smooth red tongue, and disorientation)  Collaborate with interdisciplinary team and initiate plan and interventions as ordered  Monitor patient's weight and dietary intake as ordered or per policy  Utilize nutrition screening tool and intervene per policy  Determine patient's food preferences and provide high-protein, high-caloric foods as appropriate       INTERVENTIONS:  - Monitor oral intake, urinary output, labs, and treatment plans  - Assess nutrition and hydration status and recommend course of action  - Evaluate amount of meals eaten  - Assist patient with eating if necessary   - Allow adequate time for meals  - Recommend/ encourage appropriate diets, oral nutritional supplements, and vitamin/mineral supplements  - Order, calculate, and assess calorie counts as needed  - Assess need for intravenous fluids  - Provide specific nutrition/hydration education as appropriate  - Include patient/family/caregiver in decisions related to nutrition  Outcome: Progressing accommodation and advised bifocal or progressive, patient prefers separate.     Discussed exam findings with patient.  Patient reassured and has no further questions.     Follow up in 1 year .

## 2023-04-19 NOTE — PLAN OF CARE
Problem: DISCHARGE PLANNING - CARE MANAGEMENT  Goal: Discharge to post-acute care or home with appropriate resources  INTERVENTIONS:  - Conduct assessment to determine patient/family and health care team treatment goals, and need for post-acute services based on payer coverage, community resources, and patient preferences, and barriers to discharge  - Address psychosocial, clinical, and financial barriers to discharge as identified in assessment in conjunction with the patient/family and health care team  - Arrange appropriate level of post-acute services according to patient's   needs and preference and payer coverage in collaboration with the physician and health care team  - Communicate with and update the patient/family, physician, and health care team regarding progress on the discharge plan  - Arrange appropriate transportation to post-acute venues  - patient's will require [facility] in an acute rehab vs  SNF setting   Outcome: Progressing hand grasp, leg strength strong and equal bilaterally

## (undated) DEVICE — 3M™ STERI-DRAPE™ 2 INCISE DRAPE 2050: Brand: STERI-DRAPE™

## (undated) DEVICE — SUT VICRYL 4-0 P-3 18 IN J494G

## (undated) DEVICE — ADHESIVE SKIN CLSR DERMABOND NX

## (undated) DEVICE — DRAPE C-ARM X-RAY

## (undated) DEVICE — CHLORAPREP HI-LITE 26ML ORANGE

## (undated) DEVICE — IMPERVIOUS SURGICAL GOWN, LG: Brand: CONVERTORS

## (undated) DEVICE — VIOLET BRAIDED (POLYGLACTIN 910), SYNTHETIC ABSORBABLE SUTURE: Brand: COATED VICRYL

## (undated) DEVICE — BULB SYRINGE,IRRIGATION WITH PROTECTIVE CAP: Brand: DOVER

## (undated) DEVICE — GLOVE SRG BIOGEL 7.5

## (undated) DEVICE — SUT SILK 0 CT-1 30 IN 424H

## (undated) DEVICE — BAG DECANTER

## (undated) DEVICE — GROUNDING PAD UNIVERSAL SLW

## (undated) DEVICE — MINOR PACK: Brand: MEDLINE INDUSTRIES, INC.